# Patient Record
Sex: FEMALE | Race: WHITE | Employment: FULL TIME | URBAN - METROPOLITAN AREA
[De-identification: names, ages, dates, MRNs, and addresses within clinical notes are randomized per-mention and may not be internally consistent; named-entity substitution may affect disease eponyms.]

---

## 2020-07-17 ENCOUNTER — HOSPITAL ENCOUNTER (EMERGENCY)
Age: 50
Discharge: HOME | End: 2020-07-17
Attending: EMERGENCY MEDICINE
Payer: COMMERCIAL

## 2020-07-17 ENCOUNTER — APPOINTMENT (OUTPATIENT)
Dept: GENERAL RADIOLOGY | Age: 50
End: 2020-07-17
Payer: COMMERCIAL

## 2020-07-17 VITALS
SYSTOLIC BLOOD PRESSURE: 126 MMHG | DIASTOLIC BLOOD PRESSURE: 85 MMHG | BODY MASS INDEX: 25.07 KG/M2 | OXYGEN SATURATION: 99 % | HEART RATE: 88 BPM | HEIGHT: 66 IN | WEIGHT: 156 LBS | TEMPERATURE: 101.2 F | RESPIRATION RATE: 18 BRPM

## 2020-07-17 LAB
ABSOLUTE EOS #: 0.05 K/UL (ref 0–0.44)
ABSOLUTE IMMATURE GRANULOCYTE: 0.01 K/UL (ref 0–0.3)
ABSOLUTE LYMPH #: 1.41 K/UL (ref 1.1–3.7)
ABSOLUTE MONO #: 0.57 K/UL (ref 0.1–1.2)
ALBUMIN SERPL-MCNC: 4.2 G/DL (ref 3.5–5.2)
ALBUMIN/GLOBULIN RATIO: ABNORMAL (ref 1–2.5)
ALP BLD-CCNC: 81 U/L (ref 35–104)
ALT SERPL-CCNC: 20 U/L (ref 5–33)
ANION GAP SERPL CALCULATED.3IONS-SCNC: 11 MMOL/L (ref 9–17)
AST SERPL-CCNC: 28 U/L
BASOPHILS # BLD: 1 % (ref 0–2)
BASOPHILS ABSOLUTE: <0.03 K/UL (ref 0–0.2)
BILIRUB SERPL-MCNC: 0.22 MG/DL (ref 0.3–1.2)
BILIRUBIN DIRECT: 0.08 MG/DL
BILIRUBIN, INDIRECT: 0.14 MG/DL (ref 0–1)
BUN BLDV-MCNC: 7 MG/DL (ref 6–20)
BUN/CREAT BLD: 9 (ref 9–20)
CALCIUM SERPL-MCNC: 8.9 MG/DL (ref 8.6–10.4)
CHLORIDE BLD-SCNC: 101 MMOL/L (ref 98–107)
CO2: 25 MMOL/L (ref 20–31)
CREAT SERPL-MCNC: 0.76 MG/DL (ref 0.5–0.9)
DIFFERENTIAL TYPE: ABNORMAL
EOSINOPHILS RELATIVE PERCENT: 1 % (ref 1–4)
FERRITIN: 263 UG/L (ref 13–150)
GFR AFRICAN AMERICAN: >60 ML/MIN
GFR NON-AFRICAN AMERICAN: >60 ML/MIN
GFR SERPL CREATININE-BSD FRML MDRD: NORMAL ML/MIN/{1.73_M2}
GFR SERPL CREATININE-BSD FRML MDRD: NORMAL ML/MIN/{1.73_M2}
GLOBULIN: ABNORMAL G/DL (ref 1.5–3.8)
GLUCOSE BLD-MCNC: 94 MG/DL (ref 70–99)
HCT VFR BLD CALC: 44 % (ref 36.3–47.1)
HEMOGLOBIN: 14.5 G/DL (ref 11.9–15.1)
IMMATURE GRANULOCYTES: 0 %
LACTATE DEHYDROGENASE: 160 U/L (ref 135–214)
LACTIC ACID, SEPSIS WHOLE BLOOD: NORMAL MMOL/L (ref 0.5–1.9)
LACTIC ACID, SEPSIS: 0.7 MMOL/L (ref 0.5–1.9)
LYMPHOCYTES # BLD: 35 % (ref 24–43)
MCH RBC QN AUTO: 28.8 PG (ref 25.2–33.5)
MCHC RBC AUTO-ENTMCNC: 33 G/DL (ref 28.4–34.8)
MCV RBC AUTO: 87.5 FL (ref 82.6–102.9)
MONOCYTES # BLD: 14 % (ref 3–12)
NRBC AUTOMATED: 0 PER 100 WBC
PDW BLD-RTO: 12.5 % (ref 11.8–14.4)
PLATELET # BLD: 187 K/UL (ref 138–453)
PLATELET ESTIMATE: ABNORMAL
PMV BLD AUTO: 9.5 FL (ref 8.1–13.5)
POTASSIUM SERPL-SCNC: 4.3 MMOL/L (ref 3.7–5.3)
RBC # BLD: 5.03 M/UL (ref 3.95–5.11)
RBC # BLD: ABNORMAL 10*6/UL
SEG NEUTROPHILS: 49 % (ref 36–65)
SEGMENTED NEUTROPHILS ABSOLUTE COUNT: 1.96 K/UL (ref 1.5–8.1)
SODIUM BLD-SCNC: 137 MMOL/L (ref 135–144)
TOTAL PROTEIN: 7.3 G/DL (ref 6.4–8.3)
WBC # BLD: 4 K/UL (ref 3.5–11.3)
WBC # BLD: ABNORMAL 10*3/UL

## 2020-07-17 PROCEDURE — 80048 BASIC METABOLIC PNL TOTAL CA: CPT

## 2020-07-17 PROCEDURE — 6370000000 HC RX 637 (ALT 250 FOR IP): Performed by: NURSE PRACTITIONER

## 2020-07-17 PROCEDURE — 82728 ASSAY OF FERRITIN: CPT

## 2020-07-17 PROCEDURE — 83615 LACTATE (LD) (LDH) ENZYME: CPT

## 2020-07-17 PROCEDURE — 80076 HEPATIC FUNCTION PANEL: CPT

## 2020-07-17 PROCEDURE — 83605 ASSAY OF LACTIC ACID: CPT

## 2020-07-17 PROCEDURE — 71045 X-RAY EXAM CHEST 1 VIEW: CPT

## 2020-07-17 PROCEDURE — 85025 COMPLETE CBC W/AUTO DIFF WBC: CPT

## 2020-07-17 PROCEDURE — 99284 EMERGENCY DEPT VISIT MOD MDM: CPT

## 2020-07-17 RX ORDER — AMOXICILLIN 500 MG/1
500 CAPSULE ORAL 3 TIMES DAILY
COMMUNITY
End: 2021-01-29

## 2020-07-17 RX ORDER — ACETAMINOPHEN 500 MG
TABLET ORAL
Qty: 60 TABLET | Refills: 0 | Status: SHIPPED | OUTPATIENT
Start: 2020-07-17 | End: 2021-01-29

## 2020-07-17 RX ORDER — ACETAMINOPHEN 325 MG/1
650 TABLET ORAL ONCE
Status: COMPLETED | OUTPATIENT
Start: 2020-07-17 | End: 2020-07-17

## 2020-07-17 RX ORDER — ALBUTEROL SULFATE 90 UG/1
2 AEROSOL, METERED RESPIRATORY (INHALATION) EVERY 6 HOURS PRN
Qty: 1 INHALER | Refills: 0 | Status: SHIPPED | OUTPATIENT
Start: 2020-07-17 | End: 2021-01-29

## 2020-07-17 RX ADMIN — ACETAMINOPHEN 650 MG: 325 TABLET ORAL at 19:04

## 2020-07-17 ASSESSMENT — PAIN DESCRIPTION - PAIN TYPE: TYPE: ACUTE PAIN

## 2020-07-17 ASSESSMENT — ENCOUNTER SYMPTOMS
COLOR CHANGE: 0
NAUSEA: 0
COUGH: 1
VOICE CHANGE: 0
SORE THROAT: 0
DIARRHEA: 0
RHINORRHEA: 0
SINUS PRESSURE: 0
TROUBLE SWALLOWING: 0
VOMITING: 0
CHEST TIGHTNESS: 1
ABDOMINAL PAIN: 0
FACIAL SWELLING: 0
SHORTNESS OF BREATH: 0

## 2020-07-17 ASSESSMENT — PAIN SCALES - GENERAL
PAINLEVEL_OUTOF10: 4
PAINLEVEL_OUTOF10: 4

## 2020-07-17 ASSESSMENT — PAIN DESCRIPTION - ORIENTATION: ORIENTATION: MID

## 2020-07-17 ASSESSMENT — PAIN DESCRIPTION - LOCATION: LOCATION: CHEST

## 2020-07-17 ASSESSMENT — PAIN DESCRIPTION - DESCRIPTORS: DESCRIPTORS: DISCOMFORT

## 2020-07-17 NOTE — LETTER
[facility] ED  [address] [id number]  Phone: [phone]             July 17, 2020    Patient: Andre Valera   YOB: 1970   Date of Visit: 7/17/2020       To Whom It May Concern:    Dewaellis Vega was seen and treated in our emergency department on 7/17/2020. Please excuse her from work 7/17/2020 through 7/31/2020.   Sincerely,             Signature:__________________________________

## 2020-07-17 NOTE — ED PROVIDER NOTES
[facility] ED  eMERGENCY dEPARTMENT eNCOUnter      Pt Name: Leonides Wall  MRN: [id number]  [place] 1970  Date of evaluation: 7/17/2020  Provider: GRICELDA Wright 7446       Chief Complaint   Patient presents with    Fever    Cough    Chest Pain         HISTORY OF PRESENT ILLNESS  (Location/Symptom, Timing/Onset, Context/Setting, Quality, Duration, Modifying Factors, Severity.)   Leonides Wall is a 54 y.o. female who presents to the emergency department via private auto. Reports she had 5 teeth pulled 7/14/2020. States the following day she had N/V all day. She developed a fever, cough, chest tightness yesterday. Denies diarrhea, abdominal pain, facial swelling, SOB, difficulty swallowing. Rates her pain 4/10 at this time. Nursing Notes were reviewed. ALLERGIES     Nsaids and Sulfamethoxazole-trimethoprim    CURRENT MEDICATIONS       Discharge Medication List as of 7/17/2020  7:38 PM      CONTINUE these medications which have NOT CHANGED    Details   amoxicillin (AMOXIL) 500 MG capsule Take 500 mg by mouth 3 times dailyHistorical Med      pantoprazole sodium (PROTONIX) 40 MG PACK packet Take 40 mg by mouth daily      ranitidine (ZANTAC) 150 MG capsule Take 150 mg by mouth 2 times daily      DULoxetine (CYMBALTA) 20 MG capsule   Take 60 mg by mouth daily              PAST MEDICAL HISTORY         Diagnosis Date    H. pylori infection     Headache(784.0)     Sleep apnea        SURGICAL HISTORY           Procedure Laterality Date    ABDOMEN SURGERY      CHOLECYSTECTOMY      GASTRIC BYPASS SURGERY      HYSTERECTOMY      LEEP      NASAL SINUS SURGERY      TUBAL LIGATION           FAMILY HISTORY     History reviewed. No pertinent family history. No family status information on file. SOCIAL HISTORY      reports that she has quit smoking. She has never used smokeless tobacco. She reports that she does not drink alcohol or use drugs.     REVIEW OF SYSTEMS    (2-9 systems for level 4, 10 or more for level 5)     Review of Systems   Constitutional: Positive for chills and fever. Negative for diaphoresis and fatigue. HENT: Positive for dental problem. Negative for congestion, drooling, ear discharge, ear pain, facial swelling, postnasal drip, rhinorrhea, sinus pressure, sore throat, trouble swallowing and voice change. Respiratory: Positive for cough and chest tightness. Negative for shortness of breath. Cardiovascular: Negative for chest pain and palpitations. Gastrointestinal: Negative for abdominal pain, diarrhea, nausea and vomiting. Genitourinary: Negative for dysuria. Musculoskeletal: Positive for myalgias. Negative for arthralgias, neck pain and neck stiffness. Skin: Negative for color change and rash. Neurological: Negative for dizziness, weakness, light-headedness and headaches. Except as noted above the remainder of the review of systems was reviewed and negative. PHYSICAL EXAM    (up to 7 for level 4, 8 or more for level 5)     ED Triage Vitals [07/17/20 1842]   BP Temp Temp Source Pulse Resp SpO2 Height Weight   126/85 101.2 °F (38.4 °C) Oral 88 18 99 % 5' 6\" (1.676 m) 156 lb (70.8 kg)     Physical Exam  Vitals signs reviewed. Constitutional:       General: She is not in acute distress. Appearance: She is well-developed. She is not diaphoretic. Eyes:      General: No scleral icterus. Conjunctiva/sclera: Conjunctivae normal.   Neck:      Vascular: No JVD. Cardiovascular:      Rate and Rhythm: Normal rate. Pulmonary:      Effort: Pulmonary effort is normal. No respiratory distress. Musculoskeletal:      Comments: Moves extremities. Gait steady. Skin:     General: Skin is warm and dry. Findings: No rash. Neurological:      Mental Status: She is alert and oriented to person, place, and time.    Psychiatric:         Behavior: Behavior normal.         DIAGNOSTIC RESULTS       RADIOLOGY:   Non-plain film images such as CT, Ultrasound and MRI are read by the radiologist. Plain radiographic images are visualized and preliminarily interpreted by the emergency physician with the below findings:    Interpretation per the Radiologist below, if available at the time of this note:    Xr Chest Portable    Result Date: 7/17/2020  EXAMINATION: ONE XRAY VIEW OF THE CHEST 7/17/2020 7:06 pm COMPARISON: 01/31/2008 HISTORY: ORDERING SYSTEM PROVIDED HISTORY: fever, cough TECHNOLOGIST PROVIDED HISTORY: fever, cough Reason for Exam: fever Acuity: Acute Type of Exam: Initial FINDINGS: The cardiomediastinal silhouette is within normal limits. There is no consolidation, pneumothorax or evidence for edema. No evidence for effusion. No acute osseous abnormality is identified. No acute airspace disease identified. LABS:  Labs Reviewed   CBC WITH AUTO DIFFERENTIAL - Abnormal; Notable for the following components:       Result Value    Monocytes 14 (*)     All other components within normal limits   HEPATIC FUNCTION PANEL - Abnormal; Notable for the following components: Total Bilirubin 0.22 (*)     All other components within normal limits   FERRITIN - Abnormal; Notable for the following components:    Ferritin 263 (*)     All other components within normal limits   BASIC METABOLIC PANEL   LACTATE, SEPSIS   LACTATE DEHYDROGENASE       All other labs were within normal range or not returned as of this dictation. EMERGENCY DEPARTMENT COURSE and DIFFERENTIAL DIAGNOSIS/MDM:   Vitals:    Vitals:    07/17/20 1842   BP: 126/85   Pulse: 88   Resp: 18   Temp: 101.2 °F (38.4 °C)   TempSrc: Oral   SpO2: 99%   Weight: 156 lb (70.8 kg)   Height: 5' 6\" (1.676 m)         MEDICATIONS GIVEN IN THE ED:  Medications   acetaminophen (TYLENOL) tablet 650 mg (650 mg Oral Given 7/17/20 1904)       CLINICAL DECISION MAKING:  The patient presented alert with a nontoxic appearance and was seen in conjunction with Dr. Samina Rawls.  Ferritin was mildly with this plan. FINAL IMPRESSION      1. Viral illness              DISPOSITION/PLAN   DISPOSITION Decision To Discharge 07/17/2020 07:37:21 PM      PATIENT REFERRED TO:   Kacie Ibarra MD  [address] [place] [address]  [phone]    Schedule an appointment as soon as possible for a visit       [facility] ED  [address] [facility]  [phone]          DISCHARGE MEDICATIONS:     Discharge Medication List as of 7/17/2020  7:38 PM      START taking these medications    Details   albuterol sulfate HFA (PROVENTIL HFA) 108 (90 Base) MCG/ACT inhaler Inhale 2 puffs into the lungs every 6 hours as needed for Wheezing or Shortness of Breath, Disp-1 Inhaler,R-0Print      acetaminophen (TYLENOL) 500 MG tablet Take 1-2 tabs PO q4-6h prn fever/pain. Not to exceed 3 g daily. , Disp-60 tablet,R-0Print                 (Please note that portions of this note were completed with a voice recognition program.  Efforts were made to edit the dictations but occasionally words are mis-transcribed.)    GRICELDA Bentley - MAULIK Yates, GRICELDA - MAULIK  07/17/20 [id number]

## 2020-07-17 NOTE — ED PROVIDER NOTES
eMERGENCY dEPARTMENT eNCOUnter   Independent Attestation     Pt Name: Tee Owusu  MRN: [id number]  [place] 1970  Date of evaluation: 7/17/20     Tee Owusu is a 54 y.o. female with CC: Fever; Cough; and Chest Pain      Based on the medical record the care appears appropriate. I was personally available for consultation in the Emergency Department.     Ginna Hurley MD  Attending Emergency Physician                   Ginna Hurley MD  07/17/20 1918

## 2020-07-17 NOTE — LETTER
[facility] ED  [address] [address]  Phone: [phone]             July 17, 2020    Patient: Vicky Brand   YOB: 1970   Date of Visit: 7/17/2020       To Whom It May Concern:    Mey Paulson was seen and treated in our emergency department on 7/17/2020. Please excuse her from work 7/17/2020 through 7/21/2020.     Sincerely,             Signature:__________________________________

## 2020-07-18 ENCOUNTER — CARE COORDINATION (OUTPATIENT)
Dept: CARE COORDINATION | Age: 50
End: 2020-07-18

## 2020-07-20 NOTE — CARE COORDINATION
Calls to patient for COVID19 monitoring due to ED  Visit 7/17/20. Patient unavailable and unable to leave  A message, no voicemail is set up.

## 2020-08-03 ENCOUNTER — HOSPITAL ENCOUNTER (OUTPATIENT)
Age: 50
Discharge: HOME | End: 2020-08-03

## 2020-08-03 LAB — RUBV IGG SER QL: 53.1 IU/ML

## 2020-08-03 PROCEDURE — 86735 MUMPS ANTIBODY: CPT

## 2020-08-03 PROCEDURE — 36415 COLL VENOUS BLD VENIPUNCTURE: CPT

## 2020-08-03 PROCEDURE — 86787 VARICELLA-ZOSTER ANTIBODY: CPT

## 2020-08-03 PROCEDURE — 86762 RUBELLA ANTIBODY: CPT

## 2020-08-03 PROCEDURE — 86481 TB AG RESPONSE T-CELL SUSP: CPT

## 2020-08-03 PROCEDURE — 86765 RUBEOLA ANTIBODY: CPT

## 2020-08-05 LAB
MEASLES IMMUNE (IGG): 3.78
MUV IGG SER QL: 2.08
VZV IGG SER QL IA: 1.89

## 2020-08-06 LAB — T-SPOT TB TEST: NORMAL

## 2021-01-29 ENCOUNTER — OFFICE VISIT (OUTPATIENT)
Dept: GASTROENTEROLOGY | Age: 51
End: 2021-01-29
Payer: COMMERCIAL

## 2021-01-29 VITALS
WEIGHT: 170.5 LBS | DIASTOLIC BLOOD PRESSURE: 81 MMHG | BODY MASS INDEX: 27.52 KG/M2 | HEART RATE: 103 BPM | SYSTOLIC BLOOD PRESSURE: 118 MMHG

## 2021-01-29 DIAGNOSIS — R11.0 NAUSEA: ICD-10-CM

## 2021-01-29 DIAGNOSIS — K59.09 CHRONIC CONSTIPATION: ICD-10-CM

## 2021-01-29 PROCEDURE — 99204 OFFICE O/P NEW MOD 45 MIN: CPT | Performed by: INTERNAL MEDICINE

## 2021-01-29 PROCEDURE — 1036F TOBACCO NON-USER: CPT | Performed by: INTERNAL MEDICINE

## 2021-01-29 PROCEDURE — G8484 FLU IMMUNIZE NO ADMIN: HCPCS | Performed by: INTERNAL MEDICINE

## 2021-01-29 PROCEDURE — G8419 CALC BMI OUT NRM PARAM NOF/U: HCPCS | Performed by: INTERNAL MEDICINE

## 2021-01-29 PROCEDURE — G8427 DOCREV CUR MEDS BY ELIG CLIN: HCPCS | Performed by: INTERNAL MEDICINE

## 2021-01-29 RX ORDER — AMOXICILLIN 250 MG
2 CAPSULE ORAL DAILY
Qty: 60 TABLET | Refills: 5 | Status: SHIPPED | OUTPATIENT
Start: 2021-01-29 | End: 2021-02-10

## 2021-01-29 RX ORDER — PANTOPRAZOLE SODIUM 40 MG/1
40 TABLET, DELAYED RELEASE ORAL
Qty: 30 TABLET | Refills: 5 | Status: SHIPPED | OUTPATIENT
Start: 2021-01-29 | End: 2021-02-10

## 2021-01-29 RX ORDER — SODIUM, POTASSIUM,MAG SULFATES 17.5-3.13G
1 SOLUTION, RECONSTITUTED, ORAL ORAL ONCE
Qty: 1 BOTTLE | Refills: 0 | Status: SHIPPED | OUTPATIENT
Start: 2021-01-29 | End: 2021-01-29

## 2021-01-29 RX ORDER — ONDANSETRON HYDROCHLORIDE 4 MG/5ML
4 SOLUTION ORAL 2 TIMES DAILY PRN
COMMUNITY
End: 2021-10-20

## 2021-01-29 RX ORDER — POLYETHYLENE GLYCOL 3350 17 G/17G
17 POWDER, FOR SOLUTION ORAL DAILY
Qty: 1 BOTTLE | Refills: 11 | COMMUNITY
Start: 2021-01-29 | End: 2021-02-10

## 2021-01-29 ASSESSMENT — ENCOUNTER SYMPTOMS
TROUBLE SWALLOWING: 0
WHEEZING: 0
ABDOMINAL DISTENTION: 1
ANAL BLEEDING: 0
DIARRHEA: 0
COUGH: 1
RECTAL PAIN: 0
SINUS PRESSURE: 1
SORE THROAT: 0
CHOKING: 0
VOICE CHANGE: 0
ABDOMINAL PAIN: 1
BLOOD IN STOOL: 0
CONSTIPATION: 1
BACK PAIN: 0
NAUSEA: 1
VOMITING: 0

## 2021-01-29 NOTE — PROGRESS NOTES
Reason for Referral:   No referring provider defined for this encounter. Chief Complaint   Patient presents with    Nausea     Gastric bypass in 2016 and felt better but lately she has been nausous after ever meal     Bloated    Constipation     She gets constipated terrible as well. HISTORY OF PRESENT ILLNESS: Sean Berry is a 52 y.o. female , referred for evaluation of nausea. 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 She has had issues with nausea for a while. She underwent EGD last June at [address] [place] [address]. She was noted to have candy cane changes. She was recommended to have surgery. She moved to [facility]. She continues to report nausea daily. She reports difficult bowel movements. Bowel movement every other day or so. Incomplete evacuation. Sometimes stool comes in small caliber. No blood in the stool or melena. Weight is stable. No family history of GI malignancy. No prior colonoscopy. Past Medical,Family, and Social History reviewed and does not contribute to the patient presentingcondition. Patient's PMH/PSH,SH,PSYCH Hx, MEDs, ALLERGIES, and ROS were all reviewed and updated in the appropriate sections.     PAST MEDICAL HISTORY:  Past Medical History:   Diagnosis Date    H. pylori infection     Headache(784.0)     Sleep apnea        Past Surgical History:   Procedure Laterality Date    ABDOMEN SURGERY      CHOLECYSTECTOMY      GASTRIC BYPASS SURGERY      HYSTERECTOMY      LEEP      NASAL SINUS SURGERY      TUBAL LIGATION         CURRENT MEDICATIONS:    Current Outpatient Medications:     ondansetron (ZOFRAN) 4 MG/5ML solution, Take 4 mg by mouth 2 times daily as needed for Nausea, Disp: , Rfl:     Cyanocobalamin (B-12 COMPLIANCE INJECTION IJ), Inject as directed, Disp: , Rfl:     ALLERGIES:   Allergies   Allergen Reactions    Nsaids     Sulfamethoxazole-Trimethoprim Hives and Rash       FAMILY HISTORY: No family history on file.      SOCIAL HISTORY:   Social History     Socioeconomic History    Marital status:      Spouse name: Not on file    Number of children: Not on file    Years of education: Not on file    Highest education level: Not on file   Occupational History    Not on file   Social Needs    Financial resource strain: Not on file    Food insecurity     Worry: Not on file     Inability: Not on file    Transportation needs     Medical: Not on file     Non-medical: Not on file   Tobacco Use    Smoking status: Former Smoker    Smokeless tobacco: Never Used   Substance and Sexual Activity    Alcohol use: No    Drug use: No    Sexual activity: Not on file   Lifestyle    Physical activity     Days per week: Not on file     Minutes per session: Not on file    Stress: Not on file   Relationships    Social connections     Talks on phone: Not on file     Gets together: Not on file     Attends [employer] service: Not on file     Active member of club or organization: Not on file     Attends meetings of clubs or organizations: Not on file     Relationship status: Not on file    Intimate partner violence     Fear of current or ex partner: Not on file     Emotionally abused: Not on file     Physically abused: Not on file     Forced sexual activity: Not on file   Other Topics Concern    Not on file   Social History Narrative    Not on file       REVIEW OF SYSTEMS: A 12-point review of systemswas obtained and pertinent positives and negatives were enumerated above in the history of present illness. All other reviewed systems / symptoms were negative. Review of Systems   Constitutional: Positive for appetite change and fatigue. Negative for unexpected weight change. HENT: Positive for postnasal drip and sinus pressure. Negative for dental problem, sore throat, trouble swallowing and voice change. Eyes: Positive for visual disturbance. Respiratory: Positive for cough. Negative for choking and wheezing. Cardiovascular: Negative. Negative for chest pain, palpitations and leg swelling. Gastrointestinal: Positive for abdominal distention, abdominal pain, constipation and nausea. Negative for anal bleeding, blood in stool, diarrhea, rectal pain and vomiting. Endocrine: Negative. Genitourinary: Negative. Negative for difficulty urinating. Musculoskeletal: Positive for arthralgias. Negative for back pain, gait problem and myalgias. Skin: Negative. Allergic/Immunologic: Positive for environmental allergies. Negative for food allergies. Neurological: Positive for light-headedness. Negative for dizziness, weakness, numbness and headaches. Hematological: Negative. Does not bruise/bleed easily. Psychiatric/Behavioral: Negative for sleep disturbance. The patient is nervous/anxious. LABORATORY DATA: Reviewed  Lab Results   Component Value Date    WBC 4.0 07/17/2020    HGB 14.5 07/17/2020    HCT 44.0 07/17/2020    MCV 87.5 07/17/2020     07/17/2020     07/17/2020    K 4.3 07/17/2020     07/17/2020    CO2 25 07/17/2020    BUN 7 07/17/2020    CREATININE 0.76 07/17/2020    LABALBU 4.2 07/17/2020    BILITOT 0.22 (L) 07/17/2020    ALKPHOS 81 07/17/2020    AST 28 07/17/2020    ALT 20 07/17/2020         Lab Results   Component Value Date    RBC 5.03 07/17/2020    HGB 14.5 07/17/2020    MCV 87.5 07/17/2020    MCH 28.8 07/17/2020    MCHC 33.0 07/17/2020    RDW 12.5 07/17/2020    MPV 9.5 07/17/2020    BASOPCT 1 07/17/2020    LYMPHSABS 1.41 07/17/2020    MONOSABS 0.57 07/17/2020    NEUTROABS 1.96 07/17/2020    EOSABS 0.05 07/17/2020    BASOSABS <0.03 07/17/2020         DIAGNOSTIC TESTING:     No results found. There were no vitals taken for this visit. PHYSICAL EXAMINATION: Vital signs reviewed per the nursing documentation. There is no height or weight on file to calculate BMI.    Physical Exam      I personally reviewed the nurse's notes and documentation and I agree with her notes. General: alert, appears stated age and cooperative Psych: Normal. and Alert and oriented, appropriate affect. . Normal affect. Mentation normal  HEENT: PERRLA. Clear conjunctivae and sclerae. Moist oral mucosae, no lesions or ulcers. The neck is supple, without lymphadenopathy or jugular venous distension. No masses. Normal thyroid. Cardiovascular: S1 S2 RRR no rubs or murmurs. Pulmonary: clear BL. No accessory muscle usage. Abdominal Exam: Soft, NT ND, no hepato or spleno megaly, +BS, no ascites. No groin masses or lymphadenopathy. Extremities: no lower ext edema. Skin: Warm skin. No skin rash. No spider nevi palmar erythema nail dystrophy. Joint: No joint swelling or deformity. Neurological: intact sensory. DTR+. IMPRESSION: Ms. Lulu Zuniga is a 52 y.o. female with nausea. Possibly due to her Roberto-en-Y bypass surgery. She may need referral to bariatric surgery. Significant component of constipation. Trial of MiraLAX in addition to Deborah-Colace. We will plan for colonoscopy. Protonix daily. Crush the tablet for adequate absorption. Spent 30 minutes providing patient education and counseling. Thank you for allowing me to participate in the care of Ms. Rosario. For any further questions please do not hesitate to contact me. I have reviewed and agree with the MA/LPN ROS. Note is dictated utilizing voice recognition software. Unfortunately this leads to occasional typographical errors. Please contact our office if you have any questions.     Nika Don MD  [facility]  O: #[phone]

## 2021-02-10 ENCOUNTER — TELEPHONE (OUTPATIENT)
Dept: GASTROENTEROLOGY | Age: 51
End: 2021-02-10

## 2021-02-10 RX ORDER — AMOXICILLIN 250 MG
2 CAPSULE ORAL DAILY
Qty: 180 TABLET | Refills: 3 | Status: SHIPPED | OUTPATIENT
Start: 2021-02-10

## 2021-02-10 RX ORDER — POLYETHYLENE GLYCOL 3350 17 G/17G
17 POWDER, FOR SOLUTION ORAL DAILY
Qty: 1 BOTTLE | Refills: 11 | Status: SHIPPED | OUTPATIENT
Start: 2021-02-10 | End: 2021-03-12

## 2021-02-10 RX ORDER — PANTOPRAZOLE SODIUM 40 MG/1
40 TABLET, DELAYED RELEASE ORAL
Qty: 90 TABLET | Refills: 3 | Status: SHIPPED | OUTPATIENT
Start: 2021-02-10 | End: 2021-09-14 | Stop reason: SDUPTHER

## 2021-03-01 ENCOUNTER — HOSPITAL ENCOUNTER (OUTPATIENT)
Dept: PREADMISSION TESTING | Age: 51
Discharge: HOME | End: 2021-03-05
Payer: COMMERCIAL

## 2021-03-01 VITALS — BODY MASS INDEX: 26.84 KG/M2 | HEIGHT: 66 IN | WEIGHT: 167 LBS

## 2021-03-01 NOTE — PROGRESS NOTES
Pre-op Instructions For Out-Patient Surgery    Medication Instructions:  · Please stop herbs and any supplements now (includes vitamins and minerals). · Please contact your surgeon and prescribing physician for pre-op instructions for any blood thinners. none    · If you have inhalers/aerosol treatments at home, please use them the morning of your surgery and bring the inhalers with you to the hospital. None     · Please take the following medications the morning of your surgery with a sip of water:    Pantoprazole ( Protonix )     Surgery Instructions:  1. After midnight before surgery:  Do not eat or drink anything, including water, mints, gum, and hard candy. You may brush your teeth without swallowing. No smoking, chewing tobacco, or street drugs. 2. Please shower or bathe before surgery. 3. Please do not wear any cologne, lotion, powder, deodorant, jewelry, piercings, perfume, makeup, nail polish, hair accessories, or hair spray on the day of surgery. Wear loose comfortable clothing. 4. Leave your valuables at home. Bring a storage case for any glasses/contacts. 5. An adult who is responsible for you MUST drive you home and should be with you for the first 24 hours after surgery. 6. If having out-patient knee and foot surgeries, please arrange for planned crutches, walker, or wheelchair before arriving to the hospital.    The Day of Surgery:  · Arrive at [address] Surgery Entrance at the time directed by your surgeon and check in at the desk. · If you have a living will or healthcare power of , please bring a copy. · You will be taken to the pre-op holding area where you will be prepared for surgery. A physical assessment will be performed by a nurse practitioner or house officer.   Your IV will be started and you will meet your anesthesiologist.    · We are currently limiting visitors to only one designated person in the pre-op holding area. When you go to surgery, your family will be directed to the surgical waiting room, where the doctor should speak with them after your surgery. · After surgery, you will be taken to the recovery room then when you are awake and stable you will go to the short stay unit for preparation to be discharged. Only your one designated person is allowed to come to short stay for your discharge. · If you use a Bi-PAP or C-PAP machine, please bring it with you and leave it in the car in case it is needed in recovery room.

## 2021-03-06 ENCOUNTER — HOSPITAL ENCOUNTER (OUTPATIENT)
Dept: LAB | Age: 51
Setting detail: SPECIMEN
Discharge: HOME | End: 2021-03-06
Payer: COMMERCIAL

## 2021-03-06 DIAGNOSIS — Z01.818 PREOP TESTING: Primary | ICD-10-CM

## 2021-03-06 PROCEDURE — U0005 INFEC AGEN DETEC AMPLI PROBE: HCPCS

## 2021-03-06 PROCEDURE — U0003 INFECTIOUS AGENT DETECTION BY NUCLEIC ACID (DNA OR RNA); SEVERE ACUTE RESPIRATORY SYNDROME CORONAVIRUS 2 (SARS-COV-2) (CORONAVIRUS DISEASE [COVID-19]), AMPLIFIED PROBE TECHNIQUE, MAKING USE OF HIGH THROUGHPUT TECHNOLOGIES AS DESCRIBED BY CMS-2020-01-R: HCPCS

## 2021-03-07 LAB
SARS-COV-2: NORMAL
SARS-COV-2: NOT DETECTED
SOURCE: NORMAL

## 2021-03-08 ENCOUNTER — TELEPHONE (OUTPATIENT)
Dept: PRIMARY CARE CLINIC | Age: 51
End: 2021-03-08

## 2021-03-09 ENCOUNTER — ANESTHESIA EVENT (OUTPATIENT)
Dept: ENDOSCOPY | Age: 51
End: 2021-03-09
Payer: COMMERCIAL

## 2021-03-10 ENCOUNTER — HOSPITAL ENCOUNTER (OUTPATIENT)
Age: 51
Setting detail: OUTPATIENT SURGERY
Discharge: HOME | End: 2021-03-10
Attending: INTERNAL MEDICINE | Admitting: INTERNAL MEDICINE
Payer: COMMERCIAL

## 2021-03-10 ENCOUNTER — ANESTHESIA (OUTPATIENT)
Dept: ENDOSCOPY | Age: 51
End: 2021-03-10
Payer: COMMERCIAL

## 2021-03-10 VITALS
RESPIRATION RATE: 15 BRPM | SYSTOLIC BLOOD PRESSURE: 102 MMHG | OXYGEN SATURATION: 100 % | DIASTOLIC BLOOD PRESSURE: 61 MMHG

## 2021-03-10 VITALS
SYSTOLIC BLOOD PRESSURE: 109 MMHG | BODY MASS INDEX: 26.84 KG/M2 | OXYGEN SATURATION: 99 % | RESPIRATION RATE: 16 BRPM | DIASTOLIC BLOOD PRESSURE: 72 MMHG | TEMPERATURE: 97.2 F | HEART RATE: 69 BPM | WEIGHT: 167 LBS | HEIGHT: 66 IN

## 2021-03-10 PROCEDURE — 6360000002 HC RX W HCPCS: Performed by: NURSE ANESTHETIST, CERTIFIED REGISTERED

## 2021-03-10 PROCEDURE — 7100000010 HC PHASE II RECOVERY - FIRST 15 MIN: Performed by: INTERNAL MEDICINE

## 2021-03-10 PROCEDURE — 7100000000 HC PACU RECOVERY - FIRST 15 MIN: Performed by: INTERNAL MEDICINE

## 2021-03-10 PROCEDURE — 7100000030 HC ASPR PHASE II RECOVERY - FIRST 15 MIN: Performed by: INTERNAL MEDICINE

## 2021-03-10 PROCEDURE — 7100000011 HC PHASE II RECOVERY - ADDTL 15 MIN: Performed by: INTERNAL MEDICINE

## 2021-03-10 PROCEDURE — 7100000031 HC ASPR PHASE II RECOVERY - ADDTL 15 MIN: Performed by: INTERNAL MEDICINE

## 2021-03-10 PROCEDURE — 2580000003 HC RX 258: Performed by: ANESTHESIOLOGY

## 2021-03-10 PROCEDURE — 3700000000 HC ANESTHESIA ATTENDED CARE: Performed by: INTERNAL MEDICINE

## 2021-03-10 PROCEDURE — 2709999900 HC NON-CHARGEABLE SUPPLY: Performed by: INTERNAL MEDICINE

## 2021-03-10 PROCEDURE — 7100000001 HC PACU RECOVERY - ADDTL 15 MIN: Performed by: INTERNAL MEDICINE

## 2021-03-10 PROCEDURE — 3609027000 HC COLONOSCOPY: Performed by: INTERNAL MEDICINE

## 2021-03-10 PROCEDURE — 3700000001 HC ADD 15 MINUTES (ANESTHESIA): Performed by: INTERNAL MEDICINE

## 2021-03-10 PROCEDURE — 45378 DIAGNOSTIC COLONOSCOPY: CPT | Performed by: INTERNAL MEDICINE

## 2021-03-10 RX ORDER — SODIUM CHLORIDE, SODIUM LACTATE, POTASSIUM CHLORIDE, CALCIUM CHLORIDE 600; 310; 30; 20 MG/100ML; MG/100ML; MG/100ML; MG/100ML
INJECTION, SOLUTION INTRAVENOUS CONTINUOUS
Status: CANCELLED | OUTPATIENT
Start: 2021-03-10

## 2021-03-10 RX ORDER — SODIUM CHLORIDE 0.9 % (FLUSH) 0.9 %
10 SYRINGE (ML) INJECTION EVERY 12 HOURS SCHEDULED
Status: DISCONTINUED | OUTPATIENT
Start: 2021-03-10 | End: 2021-03-10 | Stop reason: HOSPADM

## 2021-03-10 RX ORDER — PROPOFOL 10 MG/ML
INJECTION, EMULSION INTRAVENOUS PRN
Status: DISCONTINUED | OUTPATIENT
Start: 2021-03-10 | End: 2021-03-10 | Stop reason: SDUPTHER

## 2021-03-10 RX ORDER — LIDOCAINE HYDROCHLORIDE 10 MG/ML
1 INJECTION, SOLUTION EPIDURAL; INFILTRATION; INTRACAUDAL; PERINEURAL
Status: CANCELLED | OUTPATIENT
Start: 2021-03-10 | End: 2021-03-10

## 2021-03-10 RX ORDER — M-VIT,TX,IRON,MINS/CALC/FOLIC 27MG-0.4MG
1 TABLET ORAL DAILY
COMMUNITY
End: 2022-03-31

## 2021-03-10 RX ORDER — PROPOFOL 10 MG/ML
INJECTION, EMULSION INTRAVENOUS CONTINUOUS PRN
Status: DISCONTINUED | OUTPATIENT
Start: 2021-03-10 | End: 2021-03-10 | Stop reason: SDUPTHER

## 2021-03-10 RX ORDER — SODIUM CHLORIDE 9 MG/ML
INJECTION, SOLUTION INTRAVENOUS CONTINUOUS
Status: CANCELLED | OUTPATIENT
Start: 2021-03-10

## 2021-03-10 RX ORDER — SODIUM CHLORIDE, SODIUM LACTATE, POTASSIUM CHLORIDE, CALCIUM CHLORIDE 600; 310; 30; 20 MG/100ML; MG/100ML; MG/100ML; MG/100ML
INJECTION, SOLUTION INTRAVENOUS CONTINUOUS
Status: DISCONTINUED | OUTPATIENT
Start: 2021-03-10 | End: 2021-03-10 | Stop reason: HOSPADM

## 2021-03-10 RX ORDER — LIDOCAINE HYDROCHLORIDE 10 MG/ML
1 INJECTION, SOLUTION EPIDURAL; INFILTRATION; INTRACAUDAL; PERINEURAL
Status: DISCONTINUED | OUTPATIENT
Start: 2021-03-10 | End: 2021-03-10 | Stop reason: HOSPADM

## 2021-03-10 RX ORDER — SODIUM CHLORIDE 0.9 % (FLUSH) 0.9 %
10 SYRINGE (ML) INJECTION PRN
Status: DISCONTINUED | OUTPATIENT
Start: 2021-03-10 | End: 2021-03-10 | Stop reason: HOSPADM

## 2021-03-10 RX ORDER — SODIUM CHLORIDE 0.9 % (FLUSH) 0.9 %
10 SYRINGE (ML) INJECTION PRN
Status: CANCELLED | OUTPATIENT
Start: 2021-03-10

## 2021-03-10 RX ORDER — SODIUM CHLORIDE 0.9 % (FLUSH) 0.9 %
10 SYRINGE (ML) INJECTION EVERY 12 HOURS SCHEDULED
Status: CANCELLED | OUTPATIENT
Start: 2021-03-10

## 2021-03-10 RX ADMIN — SODIUM CHLORIDE, POTASSIUM CHLORIDE, SODIUM LACTATE AND CALCIUM CHLORIDE: 600; 310; 30; 20 INJECTION, SOLUTION INTRAVENOUS at 08:03

## 2021-03-10 RX ADMIN — PROPOFOL 80 MG: 10 INJECTION, EMULSION INTRAVENOUS at 09:35

## 2021-03-10 RX ADMIN — PROPOFOL 125 MCG/KG/MIN: 10 INJECTION, EMULSION INTRAVENOUS at 09:35

## 2021-03-10 SDOH — HEALTH STABILITY: MENTAL HEALTH: HOW OFTEN DO YOU HAVE A DRINK CONTAINING ALCOHOL?: NOT ASKED

## 2021-03-10 ASSESSMENT — ENCOUNTER SYMPTOMS
NAUSEA: 1
VOMITING: 0
WHEEZING: 0
SORE THROAT: 0
CONSTIPATION: 1
SHORTNESS OF BREATH: 0
COUGH: 0
BLOOD IN STOOL: 0
ABDOMINAL DISTENTION: 0
SHORTNESS OF BREATH: 0
RHINORRHEA: 0
ANAL BLEEDING: 0
ABDOMINAL PAIN: 0
STRIDOR: 0
DIARRHEA: 0

## 2021-03-10 ASSESSMENT — PULMONARY FUNCTION TESTS
PIF_VALUE: 1

## 2021-03-10 ASSESSMENT — PAIN SCALES - GENERAL
PAINLEVEL_OUTOF10: 0
PAINLEVEL_OUTOF10: 0

## 2021-03-10 ASSESSMENT — LIFESTYLE VARIABLES: SMOKING_STATUS: 0

## 2021-03-10 NOTE — OP NOTE
[facility]     PROCEDURE DATE: 03/10/21    REFERRING PHYSICIAN: No ref. provider found     PRIMARY CARE PROVIDER: Srini Montemayor MD    ATTENDING PHYSICIAN: Rosalinda Acosta MD     HISTORY: Ms. Comfort Su is a 52 y.o. female who presents to the [address] [address] Endoscopy unit for colonoscopy. The patient's clinical history is remarkable for chronic constipation. She is currently medically stable and appropriate for the planned procedure. PREOPERATIVE DIAGNOSIS: constipation. PROCEDURES:   Transanal Colonoscopy --diagnostic. POSTPROCEDURE DIAGNOSIS:  No pathology    SPECIMENS: none    MEDICATIONS:     MAC per anesthesia    EBL: minimal    INSTRUMENT: Olympus PCF-H190 AL flexible Colonoscope. PREPARATION: The nature and character of the procedure as well as risks, benefits, and alternatives were discussed with the patient and informed consent was obtained. Complications were said to include, but were not limited to: medication allergy, medication reaction, cardiovascular and respiratory problems, bleeding, perforation, infection, and/or missed diagnosis. Following arrival in the endoscopy room, the patient was placed in the left lateral decubitus position and final time-out accomplished in the presence of the nursing staff. Baseline vital signs were obtained and reviewed, and IV sedation was subsequently initiated. FINDINGS: Rectal examination demonstrated no significant visible external abnormality and digital palpation was unremarkable. Following adequate conscious sedation the colonoscope was introduced and advanced under direct visualization to the cecum, which was identified by the ileocecal valve and appendiceal orifice. The bowel preparation was felt to be adequate. This included small amounts of thin and watery stool that was able to be adequately irrigated and aspirated. Cecal intubation time was 3 minutes.      Once maximally inserted, the endoscope was withdrawn and the mucosa was carefully inspected. The mucosal exam was normal. Retroflexion was performed in the rectum and showed no pathology. Withdrawal time was 10 minutes. RECOMMENDATIONS:   1) Follow up with referring provider, as previously scheduled. 2) Continue current treatment. 3) Increas dietary fiber intake. 4) Follow-up with me in the office in 4 weeks.       Brittany Marie

## 2021-03-10 NOTE — ANESTHESIA PRE PROCEDURE
Department of Anesthesiology  Preprocedure Note       Name:  Linnea Jordan   Age:  52 y.o.  :  1970                                          MRN:  [id number]         Date:  3/10/2021      Surgeon: Richi Hammer):  Willis Johnston MD    Procedure: Procedure(s):  COLONOSCOPY DIAGNOSTIC    Medications prior to admission:   Prior to Admission medications    Medication Sig Start Date End Date Taking? Authorizing Provider   Calcium-Vitamin D-Vitamin K (CALCIUM SOFT CHEWS PO) Take by mouth 3 times daily   Yes Historical Provider, MD   Multiple Vitamins-Minerals (THERAPEUTIC MULTIVITAMIN-MINERALS) tablet Take 1 tablet by mouth daily   Yes Historical Provider, MD   polyethylene glycol (MIRALAX) 17 GM/SCOOP powder Take 17 g by mouth daily 2/10/21 3/12/21 Yes Willis Johnston MD   senna-docusate (PERICOLACE) 8.6-50 MG per tablet Take 2 tablets by mouth daily 2/10/21  Yes Willis Johnston MD   pantoprazole (PROTONIX) 40 MG tablet Take 1 tablet by mouth every morning (before breakfast) 2/10/21  Yes Willis Johnston MD   ondansetron (ZOFRAN) 4 MG/5ML solution Take 4 mg by mouth 2 times daily as needed for Nausea   Yes Historical Provider, MD   Cyanocobalamin (B-12 COMPLIANCE INJECTION IJ) Inject as directed   Yes Historical Provider, MD       Current medications:    Current Facility-Administered Medications   Medication Dose Route Frequency Provider Last Rate Last Admin    lactated ringers infusion   Intravenous Continuous Peggy Palafox  mL/hr at 03/10/21 0803 New Bag at 03/10/21 0803    sodium chloride flush 0.9 % injection 10 mL  10 mL Intravenous 2 times per day Peggy Palafox MD        sodium chloride flush 0.9 % injection 10 mL  10 mL Intravenous PRN Peggy Palafox MD        lidocaine PF 1 % injection 1 mL  1 mL Intradermal Once PRN Peggy Palafox MD           Allergies:     Allergies   Allergen Reactions    Nsaids     Hydrocodone-Acetaminophen Nausea Only    Oxycodone-Acetaminophen Nausea Only    Sulfamethoxazole-Trimethoprim Hives and Rash       Problem List:  There is no problem list on file for this patient. Past Medical History:        Diagnosis Date    Abnormal findings on esophagogastroduodenoscopy (EGD) 2020    Gastritis, tiny hiatal hernia, candy cane deformity of gastrojejunostomy    Arthritis     Chronic constipation     Gastritis     GERD (gastroesophageal reflux disease)     H. pylori infection     Headache(784.0)     Hiatal hernia     Tiny per EGD 20    Prolonged emergence from general anesthesia     Sleep apnea     Pt states has resolved since gastric bypass surgery.         Past Surgical History:        Procedure Laterality Date    CHOLECYSTECTOMY  1991    COLONOSCOPY      ENDOSCOPY, COLON, DIAGNOSTIC  2020    GASTRIC BYPASS SURGERY  2016    Roberto-en-Y    HYSTERECTOMY      LAPAROSCOPY      LEEP      NASAL SINUS SURGERY      TUBAL LIGATION         Social History:    Social History     Tobacco Use    Smoking status: Former Smoker     Quit date:      Years since quittin.1    Smokeless tobacco: Never Used   Substance Use Topics    Alcohol use: Not Currently                                Counseling given: Not Answered      Vital Signs (Current):   Vitals:    03/10/21 0741   BP: 96/64   Pulse: 65   Resp: 16   Temp: 97.3 °F (36.3 °C)   TempSrc: Infrared   SpO2: 100%   Weight: 167 lb (75.8 kg)   Height: 5' 6\" (1.676 m)                                              BP Readings from Last 3 Encounters:   03/10/21 96/64   21 118/81   20 126/85       NPO Status: Time of last liquid consumption:                         Time of last solid consumption:                         Date of last liquid consumption: 21                        Date of last solid food consumption: 21    BMI:   Wt Readings from Last 3 Encounters:   03/10/21 167 lb (75.8 kg)   21 167 lb (75.8 kg)   21 170 lb 8 oz (77.3 kg)     Body mass index is 26.95 kg/m². CBC:   Lab Results   Component Value Date    WBC 4.0 07/17/2020    RBC 5.03 07/17/2020    HGB 14.5 07/17/2020    HCT 44.0 07/17/2020    MCV 87.5 07/17/2020    RDW 12.5 07/17/2020     07/17/2020     LR    CMP:   Lab Results   Component Value Date     07/17/2020    K 4.3 07/17/2020     07/17/2020    CO2 25 07/17/2020    BUN 7 07/17/2020    CREATININE 0.76 07/17/2020    GFRAA >60 07/17/2020    LABGLOM >60 07/17/2020    GLUCOSE 94 07/17/2020    PROT 7.3 07/17/2020    CALCIUM 8.9 07/17/2020    BILITOT 0.22 07/17/2020    ALKPHOS 81 07/17/2020    AST 28 07/17/2020    ALT 20 07/17/2020       POC Tests: No results for input(s): POCGLU, POCNA, POCK, POCCL, POCBUN, POCHEMO, POCHCT in the last 72 hours. Coags: No results found for: PROTIME, INR, APTT    HCG (If Applicable):   Lab Results   Component Value Date    HCG NEGATIVE 05/19/2015        ABGs: No results found for: PHART, PO2ART, VHT3UNI, EXP3KGA, BEART, S2TXRUJI     Type & Screen (If Applicable):  No results found for: LABABO, LABRH    Drug/Infectious Status (If Applicable):  No results found for: HIV, HEPCAB    COVID-19 Screening (If Applicable):   Lab Results   Component Value Date    COVID19 Not Detected 03/06/2021         Anesthesia Evaluation  Patient summary reviewed and Nursing notes reviewed no history of anesthetic complications:   Airway: Mallampati: II  TM distance: >3 FB   Neck ROM: full  Mouth opening: > = 3 FB Dental: normal exam         Pulmonary:normal exam  breath sounds clear to auscultation  (+) sleep apnea:      (-) pneumonia, COPD, asthma, shortness of breath, recent URI, rhonchi, wheezes, rales, stridor and not a current smoker          Patient did not smoke on day of surgery.                  Cardiovascular:Negative CV ROS  Exercise tolerance: good (>4 METS),       (-) pacemaker, hypertension, valvular problems/murmurs, past MI, CAD, CABG/stent, dysrhythmias,  angina,  CHF, orthopnea, PND,  MCMANUS, murmur, weak pulses,  friction rub, systolic click, carotid bruit,  JVD and peripheral edema      Rhythm: regular  Rate: normal           Beta Blocker:  Not on Beta Blocker         Neuro/Psych:   (+) headaches:,    (-) seizures, neuromuscular disease, TIA, CVA, psychiatric history and depression/anxiety            GI/Hepatic/Renal:   (+) hiatal hernia, GERD: no interval change,      (-) PUD, hepatitis, liver disease, no renal disease, bowel prep and no morbid obesity       Endo/Other: Negative Endo/Other ROS   (+) no malignancy/cancer. (-) diabetes mellitus, hypothyroidism, hyperthyroidism, blood dyscrasia, arthritis, no electrolyte abnormalities, no malignancy/cancer               Abdominal:           Vascular: negative vascular ROS. - PVD, DVT and PE. Anesthesia Plan      MAC and general     ASA 2       Induction: intravenous. MIPS: Postoperative opioids intended and Prophylactic antiemetics administered. Anesthetic plan and risks discussed with patient. Plan discussed with CRNA. The patient was counseled at length about the risks of dianne Covid-19 during their perioperative period and any recovery window from their procedure. The patient was made aware that dianne Covid-19  may worsen their prognosis for recovering from their procedure  and lend to a higher morbidity and/or mortality risk. All material risks, benefits, and reasonable alternatives including postponing the procedure were discussed. The patient DOES wish to proceed with the procedure at this time.           Alex Orantes MD   3/10/2021

## 2021-03-10 NOTE — ANESTHESIA POSTPROCEDURE EVALUATION
POST- ANESTHESIA EVALUATION       Pt Name: Domenic Lopez  MRN: [id number]  [place]: 1970  Date of evaluation: 3/10/2021  Time:  11:50 AM      BP (!) 92/53   Pulse 58   Temp 97.2 °F (36.2 °C) (Temporal)   Resp 16   Ht 5' 6\" (1.676 m)   Wt 167 lb (75.8 kg)   SpO2 99%   BMI 26.95 kg/m²      Consciousness Level  Awake  Cardiopulmonary Status  Stable  Pain Adequately Treated YES  Nausea / Vomiting  NO  Adequate Hydration  YES  Anesthesia Related Complications NONE      Electronically signed by Flory Sequeira MD on 3/10/2021 at 11:50 AM       Department of Anesthesiology  Postprocedure Note    Patient: Domenic Lopez  MRN: [id number]  [place]: 1970  Date of evaluation: 3/10/2021  Time:  11:50 AM     Procedure Summary     Date: 03/10/21 Room / Location: [address] [place] [address] 04 / [address] [place] [address] ENDO    Anesthesia Start: 0513 Anesthesia Stop: 0797    Procedure: COLONOSCOPY DIAGNOSTIC (N/A Anus) Diagnosis: (CHRONIC CONSTIPATION)    Surgeons: Eda Rosa MD Responsible Provider: Flory Sequeira MD    Anesthesia Type: MAC, general ASA Status: 2          Anesthesia Type: MAC, general    Janki Phase I: Janki Score: 9    Janki Phase II:      Last vitals: Reviewed and per EMR flowsheets.        Anesthesia Post Evaluation

## 2021-03-10 NOTE — H&P
HISTORY and [facility] [address] [address]       NAME:  Comfort Su  MRN: [id number]   YOB: 1970   Date: 3/10/2021   Age: 52 y.o. Gender: female     COMPLAINT AND PRESENT HISTORY:   Comfort Su is 52 y.o.,  female, undergoing COLONOSCOPY DIAGNOSTIC  for CHRONIC CONSTIPATION per Dr. Randy Mccartney. Patient does have a hx of gastric bypass in 2016, about a year after this she started to experience issues w/constipation, she does admit to not drinking as much fluid as she should. She has experienced nausea for about 3 years. Over the last 6 months, she notes when she drinks, she can feel/hear the liquid in her stomach. Sometime when she eats, she notices similar s/s with gurgling of her stomach. There are times when she eats, and then feels nauseated after a few bites. Denies ABD pain. She typically has a BM 2-3 times/week- \"rodríguez\", feels like she did not complete BM fully. She did try magnesium citrate, which did not help. Denies vomiting/hematemsis. Denies recent unintended weight loss/appetite change except she does not want to eat d/t nausea. She does believe she had a colonoscopy in the past \"years ago\"- denies any known polyps. Denies dysphagia, pharyngitis, voice change. She did have an EGD in 2020- was told her pouch has a \"hook\", surgery was recommended, but patient wants to hold off on this. Denies blood in stool/black tarry stools. Findings, 20:  Findings:  -gastritis  -tiny hiatal hernia  -candy cane deformity of the gastrojejunostomy  Surgical Pathology Consultation   Patient Name: Danielle Pimentel : 1970 (Age: 54) Gender: F Taken: 2020 Reported: 6/15/2020 Physician(s): Regi Back MD Copy To:  Accession #: [id number] [facility]. Rec.  #: [id number] Acct: # [de-identified]   Final Pathologic Diagnosis  Stomach, biopsy:       Gastric mucosa with no significant histologic abnormalities.       No Helicobacter pylori organisms seen on H&E stain.       Socioeconomic History    Marital status:      Spouse name: None    Number of children: None    Years of education: None    Highest education level: None   Occupational History    None   Social Needs    Financial resource strain: None    Food insecurity     Worry: None     Inability: None    Transportation needs     Medical: None     Non-medical: None   Tobacco Use    Smoking status: Former Smoker     Quit date:      Years since quittin.1    Smokeless tobacco: Never Used   Substance and Sexual Activity    Alcohol use: Not Currently    Drug use: No    Sexual activity: None   Lifestyle    Physical activity     Days per week: None     Minutes per session: None    Stress: None   Relationships    Social connections     Talks on phone: None     Gets together: None     Attends [employer] service: None     Active member of club or organization: None     Attends meetings of clubs or organizations: None     Relationship status: None    Intimate partner violence     Fear of current or ex partner: None     Emotionally abused: None     Physically abused: None     Forced sexual activity: None   Other Topics Concern    None   Social History Narrative    None       REVIEW OF SYSTEMS      Allergies   Allergen Reactions    Nsaids     Hydrocodone-Acetaminophen Nausea Only    Oxycodone-Acetaminophen Nausea Only    Sulfamethoxazole-Trimethoprim Hives and Rash       No current facility-administered medications on file prior to encounter.       Current Outpatient Medications on File Prior to Encounter   Medication Sig Dispense Refill    polyethylene glycol (MIRALAX) 17 GM/SCOOP powder Take 17 g by mouth daily 1 Bottle 11    senna-docusate (PERICOLACE) 8.6-50 MG per tablet Take 2 tablets by mouth daily 180 tablet 3    pantoprazole (PROTONIX) 40 MG tablet Take 1 tablet by mouth every morning (before breakfast) 90 tablet 3    ondansetron (ZOFRAN) 4 MG/5ML solution Take 4 mg by mouth 2 times daily as needed for Nausea      Cyanocobalamin (B-12 COMPLIANCE INJECTION IJ) Inject as directed       (Notation: Medications listed above are not currently reconciled at the signing of this H&P note, to be reconciled in pre-op per RN)    Review of Systems   Constitutional: Positive for appetite change. Negative for chills, fever and unexpected weight change. HENT: Negative for congestion, ear pain, postnasal drip, rhinorrhea and sore throat. Respiratory: Negative for cough, shortness of breath and wheezing. Cardiovascular: Negative for chest pain, palpitations and leg swelling. Gastrointestinal: Positive for constipation and nausea. Negative for abdominal distention, abdominal pain, anal bleeding, blood in stool, diarrhea and vomiting. Genitourinary: Negative. Neurological: Negative for dizziness. Hematological: Does not bruise/bleed easily. GENERAL PHYSICAL EXAM     Vitals: Review current vital signs per RN flow sheet. GENERAL APPEARANCE:   Trevor Peters is 52 y.o.,  female, not obese, nourished, conscious, alert. Does not appear to be in any distress or pain at this time. Physical Exam   Constitutional: She is oriented to person, place, and time. She appears well-developed and well-nourished. Non-toxic appearance. She does not have a sickly appearance. She does not appear ill. No distress. HENT:   Head: Normocephalic. Right Ear: External ear normal.   Left Ear: External ear normal.   Mouth/Throat: Oropharynx is clear and moist and mucous membranes are normal. No oropharyngeal exudate, posterior oropharyngeal edema, posterior oropharyngeal erythema or tonsillar abscesses. Eyes: Conjunctivae are normal. Right eye exhibits no discharge. Left eye exhibits no discharge. Cardiovascular: Normal rate, regular rhythm, normal heart sounds and intact distal pulses. Pulses:       Radial pulses are 2+ on the right side and 2+ on the left side.         Dorsalis pedis pulses are 2+ on the right side and 2+ on the left side. Posterior tibial pulses are 2+ on the right side and 2+ on the left side. Pulmonary/Chest: Effort normal and breath sounds normal. No respiratory distress. She has no wheezes. She has no rales. Abdominal: Soft. Bowel sounds are normal. She exhibits no distension and no mass. There is no abdominal tenderness. There is no rebound and no guarding. Musculoskeletal: Normal range of motion. Right lower leg: She exhibits no tenderness and no swelling. Left lower leg: She exhibits no tenderness and no swelling. Neurological: She is alert and oriented to person, place, and time. Skin: Skin is warm and dry. She is not diaphoretic. Psychiatric: She has a normal mood and affect. Her behavior is normal.       RECENT LAB WORK     Lab Results   Component Value Date     07/17/2020    K 4.3 07/17/2020     07/17/2020    CO2 25 07/17/2020    BUN 7 07/17/2020    CREATININE 0.76 07/17/2020    GLUCOSE 94 07/17/2020    CALCIUM 8.9 07/17/2020    PROT 7.3 07/17/2020    LABALBU 4.2 07/17/2020    BILITOT 0.22 (L) 07/17/2020    ALKPHOS 81 07/17/2020    AST 28 07/17/2020    ALT 20 07/17/2020    LABGLOM >60 07/17/2020    GFRAA >60 07/17/2020    GLOB NOT REPORTED 07/17/2020     Lab Results   Component Value Date    WBC 4.0 07/17/2020    HGB 14.5 07/17/2020    HCT 44.0 07/17/2020    MCV 87.5 07/17/2020     07/17/2020     PROVISIONAL DIAGNOSES / SURGERY:      CHRONIC CONSTIPATION    COLONOSCOPY DIAGNOSTIC     There are no active problems to display for this patient.           Larry Alejandre, GRICELDA - CNP on 3/10/2021 at 7:35 AM

## 2021-04-13 ENCOUNTER — HOSPITAL ENCOUNTER (EMERGENCY)
Age: 51
Discharge: HOME | End: 2021-04-13
Attending: EMERGENCY MEDICINE
Payer: COMMERCIAL

## 2021-04-13 ENCOUNTER — APPOINTMENT (OUTPATIENT)
Dept: GENERAL RADIOLOGY | Age: 51
End: 2021-04-13
Payer: COMMERCIAL

## 2021-04-13 VITALS
DIASTOLIC BLOOD PRESSURE: 92 MMHG | OXYGEN SATURATION: 98 % | HEIGHT: 66 IN | HEART RATE: 89 BPM | SYSTOLIC BLOOD PRESSURE: 150 MMHG | TEMPERATURE: 97.7 F | RESPIRATION RATE: 18 BRPM | WEIGHT: 170 LBS | BODY MASS INDEX: 27.32 KG/M2

## 2021-04-13 DIAGNOSIS — M79.671 ACUTE PAIN OF RIGHT FOOT: Primary | ICD-10-CM

## 2021-04-13 PROCEDURE — 99283 EMERGENCY DEPT VISIT LOW MDM: CPT

## 2021-04-13 PROCEDURE — 73630 X-RAY EXAM OF FOOT: CPT

## 2021-04-13 ASSESSMENT — PAIN DESCRIPTION - LOCATION: LOCATION: FOOT

## 2021-04-13 ASSESSMENT — PAIN SCALES - GENERAL: PAINLEVEL_OUTOF10: 5

## 2021-04-14 NOTE — ED PROVIDER NOTES
EMERGENCY DEPARTMENT ENCOUNTER    Pt Name: Shiva Faustin  MRN: [id number]  [place] 1970  Date of evaluation: 4/13/21  CHIEF COMPLAINT       Chief Complaint   Patient presents with    Foot Pain     pt started having foot pain to the right foot after work, top of foot      HISTORY OF PRESENT ILLNESS   Patient is a 43-year-old female who presents to the ED complaining of acute right foot pain. Pain started earlier this afternoon while at work. She stood up from her desk and felt pain and stiffness to bottom of right foot. No trauma. No swelling. She went home and was unable to put weight on her right foot and return to the ED for evaluation. No other issues at this time. No fever, cough, shortness of breath, chest pain, abdominal pain. REVIEW OF SYSTEMS     Review of Systems   All other systems reviewed and are negative. PASTMEDICAL HISTORY     Past Medical History:   Diagnosis Date    Abnormal findings on esophagogastroduodenoscopy (EGD) 06/12/2020    Gastritis, tiny hiatal hernia, candy cane deformity of gastrojejunostomy    Arthritis     Chronic constipation     Gastritis     GERD (gastroesophageal reflux disease)     H. pylori infection     Headache(784.0)     Hiatal hernia     Tiny per EGD 6-12-20    Prolonged emergence from general anesthesia     Sleep apnea     Pt states has resolved since gastric bypass surgery.       SURGICAL HISTORY       Past Surgical History:   Procedure Laterality Date    CHOLECYSTECTOMY  11/1991    COLONOSCOPY      COLONOSCOPY N/A 3/10/2021    COLONOSCOPY DIAGNOSTIC performed [facility] Lloyd [facility] at [facility] [address], DIAGNOSTIC  06/12/2020    GASTRIC BYPASS SURGERY  2016    Roberto-en-Y    HYSTERECTOMY      LAPAROSCOPY      LEEP      NASAL SINUS SURGERY      TUBAL LIGATION       CURRENT MEDICATIONS       Previous Medications    CALCIUM-VITAMIN D-VITAMIN K (CALCIUM SOFT CHEWS PO)    Take by mouth 3 times daily    CYANOCOBALAMIN (B-12 COMPLIANCE INJECTION IJ)    Inject as directed    MULTIPLE VITAMINS-MINERALS (THERAPEUTIC MULTIVITAMIN-MINERALS) TABLET    Take 1 tablet by mouth daily    ONDANSETRON (ZOFRAN) 4 MG/5ML SOLUTION    Take 4 mg by mouth 2 times daily as needed for Nausea    PANTOPRAZOLE (PROTONIX) 40 MG TABLET    Take 1 tablet by mouth every morning (before breakfast)    SENNA-DOCUSATE (PERICOLACE) 8.6-50 MG PER TABLET    Take 2 tablets by mouth daily     ALLERGIES     is allergic to nsaids; hydrocodone-acetaminophen; oxycodone-acetaminophen; and sulfamethoxazole-trimethoprim. FAMILY HISTORY     She indicated that her mother is . She indicated that the status of her father is unknown. SOCIAL HISTORY       Social History     Tobacco Use    Smoking status: Former Smoker     Quit date:      Years since quittin.2    Smokeless tobacco: Never Used   Substance Use Topics    Alcohol use: Not Currently    Drug use: No     PHYSICAL EXAM     INITIAL VITALS: BP (!) 150/92   Pulse 89   Temp 97.7 °F (36.5 °C) (Oral)   Resp 18   Ht 5' 6\" (1.676 m)   Wt 170 lb (77.1 kg)   SpO2 98%   BMI 27.44 kg/m²    Physical Exam  HENT:      Head: Normocephalic. Right Ear: External ear normal.      Left Ear: External ear normal.      Nose: Nose normal.   Eyes:      Conjunctiva/sclera: Conjunctivae normal.   Cardiovascular:      Rate and Rhythm: Normal rate. Pulmonary:      Effort: Pulmonary effort is normal.   Abdominal:      General: Abdomen is flat. Musculoskeletal:      Comments: Tenderness mid plantar aspect right foot. No swelling. No erythema. No signs of trauma. Skin:     General: Skin is dry. Neurological:      Mental Status: She is alert. Mental status is at baseline. Psychiatric:         Mood and Affect: Mood normal.         Behavior: Behavior normal.      Comments:     Limited exam secondary to Covid-19 pandemic.          MEDICAL DECISION MAKING:   The patient is hemodynamically stable, afebrile, nontoxic-appearing. Physical exam notable for tenderness plantar aspect right foot  Based on history and exam differential occludes fracture versus inflammation. ED plan for x-ray right foot, reassess. DIAGNOSTIC RESULTS   EKG:All EKG's are interpreted by the Emergency Department Physician who either signs or Co-signs this chart in the absence of a cardiologist.        RADIOLOGY:All plain film, CT, MRI, and formal ultrasound images (except ED bedside ultrasound) are read by the radiologist, see reports below, unless otherwisenoted in MDM or here. XR FOOT RIGHT (MIN 3 VIEWS)   Final Result   No acute osseous abnormality. LABS: All lab results were reviewed by myself, and all abnormals are listed below. Labs Reviewed - No data to display    EMERGENCY DEPARTMENTCOURSE:   Patient did well in the ED. X-ray negative for acute osseous injury. Most likely plantar fasciitis or other inflammatory process  Foot wrapped in bandage for support. No further work-up indicated this time. Nursing notes reviewed. At this time this is what I find, the patient appears well and does not appear sick or toxic. I gave my usual and customary discussion of the risks and benefits of discharge versus admission. I answered the patient's questions. I gave the patient strict return precautions. Patient expressed understanding of the discharge instructions. Dictated but not reviewed. Vitals:    Vitals:    04/13/21 2020   BP: (!) 150/92   Pulse: 89   Resp: 18   Temp: 97.7 °F (36.5 °C)   TempSrc: Oral   SpO2: 98%   Weight: 170 lb (77.1 kg)   Height: 5' 6\" (1.676 m)       The patient was given the following medications while in the emergency department:  No orders of the defined types were placed in this encounter. CONSULTS:  None    FINAL IMPRESSION      1.  Acute pain of right foot          DISPOSITION/PLAN   DISPOSITION Decision To Discharge 04/13/2021 10:13:06 PM      PATIENT REFERRED TO:  Alyson Rutherford [address] [place] [id number]  [phone]    In 2 days      DISCHARGE MEDICATIONS:  New Prescriptions    No medications on file     Ginna Domínguez MD  Attending Emergency Physician                    Tom Joyce MD  04/13/21 7150

## 2021-06-16 ENCOUNTER — OFFICE VISIT (OUTPATIENT)
Dept: INTERNAL MEDICINE CLINIC | Age: 51
End: 2021-06-16
Payer: MEDICARE

## 2021-06-16 VITALS
BODY MASS INDEX: 27.97 KG/M2 | HEART RATE: 71 BPM | OXYGEN SATURATION: 97 % | WEIGHT: 174 LBS | SYSTOLIC BLOOD PRESSURE: 100 MMHG | DIASTOLIC BLOOD PRESSURE: 68 MMHG | HEIGHT: 66 IN

## 2021-06-16 DIAGNOSIS — Z98.84 STATUS POST GASTRIC BYPASS FOR OBESITY: ICD-10-CM

## 2021-06-16 DIAGNOSIS — R11.0 NAUSEA: ICD-10-CM

## 2021-06-16 DIAGNOSIS — L30.4 INTERTRIGO: ICD-10-CM

## 2021-06-16 DIAGNOSIS — F41.9 ANXIETY: Primary | ICD-10-CM

## 2021-06-16 DIAGNOSIS — Z13.220 SCREENING FOR HYPERLIPIDEMIA: ICD-10-CM

## 2021-06-16 PROCEDURE — 1036F TOBACCO NON-USER: CPT | Performed by: INTERNAL MEDICINE

## 2021-06-16 PROCEDURE — G8419 CALC BMI OUT NRM PARAM NOF/U: HCPCS | Performed by: INTERNAL MEDICINE

## 2021-06-16 PROCEDURE — 99214 OFFICE O/P EST MOD 30 MIN: CPT | Performed by: INTERNAL MEDICINE

## 2021-06-16 PROCEDURE — G8427 DOCREV CUR MEDS BY ELIG CLIN: HCPCS | Performed by: INTERNAL MEDICINE

## 2021-06-16 RX ORDER — NYSTATIN 100000 [USP'U]/G
POWDER TOPICAL
Qty: 60 G | Refills: 3 | Status: SHIPPED | OUTPATIENT
Start: 2021-06-16 | End: 2022-03-31

## 2021-06-16 RX ORDER — ONDANSETRON HYDROCHLORIDE 4 MG/5ML
4 SOLUTION ORAL 2 TIMES DAILY PRN
Status: CANCELLED | OUTPATIENT
Start: 2021-06-16

## 2021-06-16 RX ORDER — DULOXETIN HYDROCHLORIDE 20 MG/1
20 CAPSULE, DELAYED RELEASE ORAL DAILY
Qty: 30 CAPSULE | Refills: 3 | Status: SHIPPED | OUTPATIENT
Start: 2021-06-16 | End: 2022-04-27 | Stop reason: SDUPTHER

## 2021-06-16 RX ORDER — ONDANSETRON 4 MG/1
4 TABLET, ORALLY DISINTEGRATING ORAL EVERY 8 HOURS PRN
Qty: 90 TABLET | Refills: 3 | Status: SHIPPED | OUTPATIENT
Start: 2021-06-16 | End: 2022-03-31 | Stop reason: SDUPTHER

## 2021-06-16 ASSESSMENT — PATIENT HEALTH QUESTIONNAIRE - PHQ9
SUM OF ALL RESPONSES TO PHQ QUESTIONS 1-9: 0
1. LITTLE INTEREST OR PLEASURE IN DOING THINGS: 0
2. FEELING DOWN, DEPRESSED OR HOPELESS: 0
SUM OF ALL RESPONSES TO PHQ9 QUESTIONS 1 & 2: 0
SUM OF ALL RESPONSES TO PHQ QUESTIONS 1-9: 0
SUM OF ALL RESPONSES TO PHQ QUESTIONS 1-9: 0

## 2021-06-16 ASSESSMENT — ENCOUNTER SYMPTOMS
NAUSEA: 1
COLOR CHANGE: 0
SHORTNESS OF BREATH: 0
EYE PAIN: 0
EYE DISCHARGE: 0
BLOOD IN STOOL: 0
COUGH: 0
TROUBLE SWALLOWING: 0
ABDOMINAL DISTENTION: 0
DIARRHEA: 0
WHEEZING: 0

## 2021-06-16 NOTE — PROGRESS NOTES
[address] [address]  Dept: [phone]  Dept Fax: [phone]    Jovanna Jones is a 52 y.o. female who presents today for her medical conditions/complaintsas noted below. Jovanna Jones is c/o of   Chief Complaint   Patient presents with    New Patient    Anxiety         HPI:     Post bariatic gastic bypass  Anxiety        No results found for: LABA1C          ( goal A1Cis < 7)   No results found for: LABMICR  No results found for: LDLCHOLESTEROL, LDLCALC    (goal LDL is <100)   AST (U/L)   Date Value   2020 28     ALT (U/L)   Date Value   2020 20     BUN (mg/dL)   Date Value   2020 7     BP Readings from Last 3 Encounters:   21 100/68   21 (!) 150/92   03/10/21 102/61          (goal 120/80)    Past Medical History:   Diagnosis Date    Abnormal findings on esophagogastroduodenoscopy (EGD) 2020    Gastritis, tiny hiatal hernia, candy cane deformity of gastrojejunostomy    Arthritis     Chronic constipation     Gastritis     GERD (gastroesophageal reflux disease)     H. pylori infection     Headache(784.0)     Hiatal hernia     Tiny per EGD 20    Prolonged emergence from general anesthesia     Sleep apnea     Pt states has resolved since gastric bypass surgery.        Past Surgical History:   Procedure Laterality Date    CHOLECYSTECTOMY  1991    COLONOSCOPY      COLONOSCOPY N/A 3/10/2021    COLONOSCOPY DIAGNOSTIC performed by Good Ramirez MD at [address], [place], DIAGNOSTIC  2020    GASTRIC BYPASS SURGERY  2016    Roberto-en-Y    HYSTERECTOMY      LAPAROSCOPY      LEEP      NASAL SINUS SURGERY      TUBAL LIGATION         Family History   Problem Relation Age of Onset    Cancer Mother 64        ovarian cancer     No Known Problems Father        Social History     Tobacco Use    Smoking status: Former Smoker     Quit date:      Years since quittin.4    Smokeless tobacco: Never Used   Substance Use Topics    Alcohol use: Not Currently      Current Outpatient Medications   Medication Sig Dispense Refill    ondansetron (ZOFRAN-ODT) 4 MG disintegrating tablet Place 1 tablet under the tongue every 8 hours as needed for Nausea or Vomiting 90 tablet 3    DULoxetine (CYMBALTA) 20 MG extended release capsule Take 1 capsule by mouth daily 30 capsule 3    nystatin (MYCOSTATIN) 619339 UNIT/GM powder Apply 3 times daily. 60 g 3    Calcium-Vitamin D-Vitamin K (CALCIUM SOFT CHEWS PO) Take by mouth 3 times daily      Multiple Vitamins-Minerals (THERAPEUTIC MULTIVITAMIN-MINERALS) tablet Take 1 tablet by mouth daily      senna-docusate (PERICOLACE) 8.6-50 MG per tablet Take 2 tablets by mouth daily 180 tablet 3    pantoprazole (PROTONIX) 40 MG tablet Take 1 tablet by mouth every morning (before breakfast) 90 tablet 3    ondansetron (ZOFRAN) 4 MG/5ML solution Take 4 mg by mouth 2 times daily as needed for Nausea      Cyanocobalamin (B-12 COMPLIANCE INJECTION IJ) Inject as directed       No current facility-administered medications for this visit. Allergies   Allergen Reactions    Nsaids     Hydrocodone-Acetaminophen Nausea Only    Oxycodone-Acetaminophen Nausea Only    Sulfamethoxazole-Trimethoprim Hives and Rash       Health Maintenance   Topic Date Due    Hepatitis C screen  Never done    COVID-19 Vaccine (1) Never done    HIV screen  Never done    DTaP/Tdap/Td vaccine (1 - Tdap) Never done    Cervical cancer screen  Never done    Lipid screen  Never done    Flu vaccine  Completed    Hepatitis A vaccine  Aged Out    Hepatitis B vaccine  Aged Out    Hib vaccine  Aged Out    Meningococcal (ACWY) vaccine  Aged Out    Pneumococcal 0-64 years Vaccine  Aged Out       Subjective:     Review of Systems   Constitutional: Negative for appetite change, diaphoresis and fatigue. HENT: Negative for ear discharge and trouble swallowing.     Eyes: Negative for pain and discharge. Respiratory: Negative for cough, shortness of breath and wheezing. Cardiovascular: Negative for chest pain and palpitations. Gastrointestinal: Positive for nausea. Negative for abdominal distention, blood in stool and diarrhea. Endocrine: Negative for polydipsia and polyphagia. Genitourinary: Negative for difficulty urinating and frequency. Musculoskeletal: Negative for gait problem, myalgias and neck pain. Skin: Negative for color change and rash. Allergic/Immunologic: Negative for environmental allergies and food allergies. Neurological: Negative for dizziness and headaches. Hematological: Negative for adenopathy. Does not bruise/bleed easily. Psychiatric/Behavioral: Negative for behavioral problems and sleep disturbance. Objective:     Physical Exam  Constitutional:       Appearance: She is well-developed. She is not diaphoretic. HENT:      Head: Normocephalic and atraumatic. Eyes:      General:         Right eye: No discharge. Left eye: No discharge. Extraocular Movements:      Right eye: Normal extraocular motion. Left eye: Normal extraocular motion. Conjunctiva/sclera: Conjunctivae normal.      Right eye: Right conjunctiva is not injected. Left eye: Left conjunctiva is not injected. Neck:      Thyroid: No thyroid mass or thyromegaly. Vascular: No JVD. Cardiovascular:      Rate and Rhythm: Normal rate and regular rhythm. Heart sounds: No murmur heard. No friction rub. Pulmonary:      Effort: Pulmonary effort is normal. No tachypnea, bradypnea, accessory muscle usage or respiratory distress. Breath sounds: Normal breath sounds. No wheezing or rales. Abdominal:      General: Bowel sounds are normal. There is no distension. Palpations: Abdomen is soft. Tenderness: There is no abdominal tenderness. There is no rebound. Comments: Intertrigo     Musculoskeletal:         General: No tenderness.  Normal range of motion. Cervical back: Normal range of motion and neck supple. No edema or erythema. Lymphadenopathy:      Head:      Right side of head: No submental or submandibular adenopathy. Left side of head: No submental or submandibular adenopathy. Cervical: No cervical adenopathy. Skin:     General: Skin is warm. Coloration: Skin is not pale. Findings: No bruising, ecchymosis or rash. Neurological:      Mental Status: She is alert and oriented to person, place, and time. Cranial Nerves: No cranial nerve deficit. Sensory: No sensory deficit. Motor: No atrophy or abnormal muscle tone. Coordination: Coordination normal.   Psychiatric:         Mood and Affect: Mood is not anxious. Affect is not angry. Speech: Speech is not slurred. Behavior: Behavior normal. Behavior is not aggressive. Thought Content: Thought content does not include homicidal ideation. Cognition and Memory: Memory is not impaired. /68   Pulse 71   Ht 5' 6\" (1.676 m)   Wt 174 lb (78.9 kg)   SpO2 97%   BMI 28.08 kg/m²     Assessment:       Diagnosis Orders   1. Anxiety     2. Screening for hyperlipidemia  Lipid, Fasting   3. Status post gastric bypass for obesity  Basic Metabolic Panel    Comprehensive Metabolic Panel    Lipid Panel    CBC   4. Nausea     5. Intertrigo               Plan:      No follow-ups on file.     Orders Placed This Encounter   Procedures    Lipid, Fasting     Standing Status:   Future     Standing Expiration Date:   6/14/2022    Basic Metabolic Panel     Standing Status:   Future     Standing Expiration Date:   6/16/2022    Comprehensive Metabolic Panel     Standing Status:   Future     Standing Expiration Date:   6/16/2022    Lipid Panel     Standing Status:   Future     Standing Expiration Date:   6/16/2022     Order Specific Question:   Is Patient Fasting?/# of Hours     Answer:   10-12    CBC     Standing Status:   Future Standing Expiration Date:   6/16/2022     Orders Placed This Encounter   Medications    ondansetron (ZOFRAN-ODT) 4 MG disintegrating tablet     Sig: Place 1 tablet under the tongue every 8 hours as needed for Nausea or Vomiting     Dispense:  90 tablet     Refill:  3    DULoxetine (CYMBALTA) 20 MG extended release capsule     Sig: Take 1 capsule by mouth daily     Dispense:  30 capsule     Refill:  3    nystatin (MYCOSTATIN) 564014 UNIT/GM powder     Sig: Apply 3 times daily. Dispense:  60 g     Refill:  3    nw pt to office  [place] with dr Jaswant Velarde MA  Hx of gastic bypass  Anxiety  intertrogo  wildo above  See with resutl  Vitamin suplemt post byapss recommned     Patient given educational materials - see patient instructions. Discussed use, benefit, and side effects of prescribed medications. All patientquestions answered. Pt voiced understanding. Reviewed health maintenance. Instructedto continue current medications, diet and exercise. Patient agreed with treatmentplan. Follow up as directed.      Electronically signed by Torito Marshall MD on 6/16/2021 at 1:28 PM

## 2021-06-16 NOTE — PROGRESS NOTES
Visit Information    Have you changed or started any medications since your last visit including any over-the-counter medicines, vitamins, or herbal medicines? no   Are you having any side effects from any of your medications? -  no  Have you stopped taking any of your medications? Is so, why? -  no    Have you seen any other physician or provider since your last visit? Yes - Records Obtained  Have you had any other diagnostic tests since your last visit? No  Have you been seen in the emergency room and/or had an admission to a hospital since we last saw you? No  Have you had your routine dental cleaning in the past 6 months? yes -     Have you activated your [employer] account? If not, what are your barriers?  Yes     Patient Care Team:  Ambar Akers MD as PCP - General (Family Medicine)    Medical History Review  Past Medical, Family, and Social History reviewed and does not contribute to the patient presenting condition    Health Maintenance   Topic Date Due    Hepatitis C screen  Never done    COVID-19 Vaccine (1) Never done    HIV screen  Never done    DTaP/Tdap/Td vaccine (1 - Tdap) Never done    Cervical cancer screen  Never done    Lipid screen  Never done    Flu vaccine  Completed    Hepatitis A vaccine  Aged Out    Hepatitis B vaccine  Aged Out    Hib vaccine  Aged Out    Meningococcal (ACWY) vaccine  Aged Out    Pneumococcal 0-64 years Vaccine  Aged Out

## 2021-06-18 ENCOUNTER — TELEPHONE (OUTPATIENT)
Dept: INTERNAL MEDICINE CLINIC | Age: 51
End: 2021-06-18

## 2021-06-18 DIAGNOSIS — Z98.84 STATUS POST GASTRIC BYPASS FOR OBESITY: Primary | ICD-10-CM

## 2021-06-18 DIAGNOSIS — M79.3 PANNICULITIS: ICD-10-CM

## 2021-06-18 NOTE — TELEPHONE ENCOUNTER
Pt called requesting referral to plastic surgeon for the extra skin d/t bariatric surgery faxed to Kameron Fernández MD,   fax # [phone].     Referral pended for approval.

## 2021-08-25 ENCOUNTER — TELEPHONE (OUTPATIENT)
Dept: SURGERY | Age: 51
End: 2021-08-25

## 2021-08-25 ENCOUNTER — INITIAL CONSULT (OUTPATIENT)
Dept: SURGERY | Age: 51
End: 2021-08-25
Payer: COMMERCIAL

## 2021-08-25 VITALS
WEIGHT: 174 LBS | HEART RATE: 80 BPM | BODY MASS INDEX: 27.97 KG/M2 | OXYGEN SATURATION: 98 % | RESPIRATION RATE: 12 BRPM | HEIGHT: 66 IN

## 2021-08-25 DIAGNOSIS — E65 SYMPTOMATIC ABDOMINAL PANNICULUS: ICD-10-CM

## 2021-08-25 DIAGNOSIS — M62.08 DIASTASIS OF RECTUS ABDOMINIS: Primary | ICD-10-CM

## 2021-08-25 PROCEDURE — 99203 OFFICE O/P NEW LOW 30 MIN: CPT | Performed by: PLASTIC SURGERY

## 2021-08-25 PROCEDURE — G8427 DOCREV CUR MEDS BY ELIG CLIN: HCPCS | Performed by: PLASTIC SURGERY

## 2021-08-25 PROCEDURE — 1036F TOBACCO NON-USER: CPT | Performed by: PLASTIC SURGERY

## 2021-08-25 PROCEDURE — G8419 CALC BMI OUT NRM PARAM NOF/U: HCPCS | Performed by: PLASTIC SURGERY

## 2021-08-25 NOTE — LETTER
[facility]  [address] [place], [place] [address]  Phone: [phone]  Fax: [phone]    Leslie Gomez MD    August 25, 2021     Iqra Joseph, [address] [place] [id number]    Patient: Cindi Thornton   MR Number: [id number]   YOB: 1970   Date of Visit: 8/25/2021       Dear Iqra Joseph: Thank you for referring Prince Wood to me for evaluation/treatment. Below are the relevant portions of my assessment and plan of care. Plan:  I discussed the differences between a panniculectomy and abdominoplasty to the patient. The risk of both procedures were discussed with patient in detail. All questions were answered. If you have questions, please do not hesitate to call me. I look forward to following Beni Anchors along with you.     Sincerely,    MD Leslie Duque MD

## 2021-08-25 NOTE — PROGRESS NOTES
[address] [place] [address] SURGICAL SPECIALISTS  [facility] [id number]       History and Physical     Chief Complaint   Patient presents with    Panniculectomy        HPI:   Ezequiel Ding is a 52 y.o. female who presents status post bariatric surgery. Patient has lost over 100 pounds. Patient has maintained her weight. Her excess skin in her abdominal region affects her quality of life and her daily activities. The pannus has an odor to it. Interferes with additional exercises. It is painful and pulls on her abdominal skin. It prevents her close from fitting well. It pulls on her pubic hair. Patient continually gets rashes under the area. Patient has been treating it with multiple modalities including neuro sporting, nystatin powder and ointments with constant recurrence. Patient's initial weight was 250 pounds. Patient continuously also has back pain. Patient is here to discuss the excess skin in her abdominal region. Medications:     Current Outpatient Medications   Medication Sig Dispense Refill    ondansetron (ZOFRAN-ODT) 4 MG disintegrating tablet Place 1 tablet under the tongue every 8 hours as needed for Nausea or Vomiting 90 tablet 3    DULoxetine (CYMBALTA) 20 MG extended release capsule Take 1 capsule by mouth daily 30 capsule 3    nystatin (MYCOSTATIN) 481748 UNIT/GM powder Apply 3 times daily.  60 g 3    Calcium-Vitamin D-Vitamin K (CALCIUM SOFT CHEWS PO) Take by mouth 3 times daily      Multiple Vitamins-Minerals (THERAPEUTIC MULTIVITAMIN-MINERALS) tablet Take 1 tablet by mouth daily      senna-docusate (PERICOLACE) 8.6-50 MG per tablet Take 2 tablets by mouth daily 180 tablet 3    pantoprazole (PROTONIX) 40 MG tablet Take 1 tablet by mouth every morning (before breakfast) 90 tablet 3    ondansetron (ZOFRAN) 4 MG/5ML solution Take 4 mg by mouth 2 times daily as needed for Nausea      Cyanocobalamin (B-12 COMPLIANCE INJECTION IJ) Inject as directed       No current facility-administered medications for this visit. Allergies: Allergies   Allergen Reactions    Nsaids     Hydrocodone-Acetaminophen Nausea Only    Oxycodone-Acetaminophen Nausea Only    Sulfamethoxazole-Trimethoprim Hives and Rash     Review of Systems:   Constitutional: Negative for fever, chills, fatigue and unexpected weight change. HENT: Patient has history of headaches. Eyes: Negative for pain and discharge. Respiratory: Patient also has a history of sleep apnea. .    Cardiovascular: Negative for chest pain. Gastrointestinal: Patient has a history of GERD. Patient also has history of gastritis. Patient also has chronic constipation. Patient also has hiatal hernia. Skin: Negative for pallor and rash. Neurological: Negative for seizures, syncope and numbness. Hematological: Does not bruise/bleed easily. Psychiatric/Behavioral: Negative for behavioral problems. The patient is not nervous/anxious. Musculoskeletal: Patient has a history of arthritis. Past Medical History:   Diagnosis Date    Abnormal findings on esophagogastroduodenoscopy (EGD) 06/12/2020    Gastritis, tiny hiatal hernia, candy cane deformity of gastrojejunostomy    Arthritis     Chronic constipation     Gastritis     GERD (gastroesophageal reflux disease)     H. pylori infection     Headache(784.0)     Hiatal hernia     Tiny per EGD 6-12-20    Prolonged emergence from general anesthesia     Sleep apnea     Pt states has resolved since gastric bypass surgery.       Past Surgical History:   Procedure Laterality Date    CHOLECYSTECTOMY  11/1991    COLONOSCOPY      COLONOSCOPY N/A 3/10/2021    COLONOSCOPY DIAGNOSTIC performed by Corinne Mccarthy MD at [address], DIAGNOSTIC  06/12/2020    GASTRIC BYPASS SURGERY  2016    [facility]    HYSTERECTOMY      LAPAROSCOPY      LEEP      NASAL SINUS SURGERY      TUBAL LIGATION       Social History     Socioeconomic History    Marital status:      Spouse name: Not on file    Number of children: Not on file    Years of education: Not on file    Highest education level: Not on file   Occupational History    Not on file   Tobacco Use    Smoking status: Former Smoker     Quit date:      Years since quittin.6    Smokeless tobacco: Never Used   Vaping Use    Vaping Use: Former   Substance and Sexual Activity    Alcohol use: Not Currently    Drug use: No    Sexual activity: Not on file   Other Topics Concern    Not on file   Social History Narrative    Not on file     Social Determinants of Health     Financial Resource Strain:     Difficulty of Paying Living Expenses:    Food Insecurity:     Worried About [address] in the Last Year:     [address] [employer] in the Last Year:    Transportation Needs:     Lack of Transportation (Medical):  Lack of Transportation (Non-Medical):    Physical Activity:     Days of Exercise per Week:     Minutes of Exercise per Session:    Stress:     Feeling of Stress :    Social Connections:     Frequency of Communication with Friends and Family:     Frequency of Social Gatherings with Friends and Family:     Attends [employer] Services:     Active Member of Clubs or Organizations:     Attends Club or Organization Meetings:     Marital Status:    Intimate Partner Violence:     Fear of Current or Ex-Partner:     Emotionally Abused:     Physically Abused:     Sexually Abused:      Family History   Problem Relation Age of Onset    Cancer Mother 64        ovarian cancer     No Known Problems Father      Physical Exam:   Pulse 80   Resp 12   Ht 5' 6\" (1.676 m)   Wt 174 lb (78.9 kg)   SpO2 98%   BMI 28.08 kg/m²    Body mass index is 28.08 kg/m². Physical Exam   Nursing note and vitals reviewed. Constitutional: Oriented to person, place, and time. Appears well-developed and well-nourished. No distress. Head: Normocephalic and atraumatic.    Eyes: Conjunctivae and EOM are normal.   Pulmonary/Chest: Effort normal. No respiratory distress. Neurological: Alert and oriented to person, place, and time. Skin:   Patient has a history of rashes. Patient also has multiple striae. Patient also has excess skin in both horizontal and vertical direction. Abdomen: Soft nontender nondistended. Rectus diastases present. Psychiatric: Normal mood and affect. Behavior is normal    PANNICULECTOMY HEALTH CHECKLIST:  Height:     Weight:      BMI: There is no height or weight on file to calculate BMI. Does your pannus affect your daily activities and quality of life? Yes  x    No    How long: Which daily living activities are affected in the following ways? Cleaning of feet    Odor x   Interferes with exercise x   [place]   Painful pulling of abdominal skin    Clothes not fitting  x          Urination   Pulling of pubic hair    Seat belt not fitting    Other:      Back pain? Yes x     No        Any Treatment? Yes      No x        Any Testing? Yes      No x  Where/What:     Have you had bariatric surgery? Yes  x    No 8/25/2016        Initial weight: 250        Weight stable for how long? Since 2017   Do you have a hernia? Yes  x    No (see notes)        Testing:  CT      Date/location:    Have you had a hernia repair in the past?  Yes      No x        Was mesh used? Yes      No x        Surgeon for hernia:    Any Rashes/Ulcers under folds of skin? Yes  x    No         Medications used:                                                        OTC Neosporin                                                                                                RX Nystatin powder, ointment         Notes: patient has a hiatal hernia 6/12/2020  Slight hernia seen on colonoscopy 3/10/21    Imaging:                               Impression/Plan:      Diagnosis Orders   1. Diastasis of rectus abdominis     2.  Symptomatic abdominal panniculus         Patient Active Problem TREATMENTS  Alternative forms of management consist of not treating the areas of loose skin and fatty deposits. Liposuction may be a surgical alternative to if there is good skin tone and localized abdominal fatty. If you have lost your skin laxity as apparent by multiple stretch marks you will not be a good liposuction candidate. Diet and exercise programs may be of benefit in the overall reduction of excess body fat and contour improvement. Risks and potential complications are associated with alternative surgical forms of treatment. RISKS OF PANNICULECTOMY SURGERY  Every surgical procedure involves a certain amount of risk and it is important that you understand these risks and the possible complications associated with them. In addition, every procedure has limitations. An individual's choice to undergo a surgical procedure is based on the comparison of the risk to potential benefit. Although the majority of patients do not experience these complications, you should discuss each of them with your plastic surgeon to make sure you completely understand all possible consequences of a abdominoplasty/panniculectomy. Bleeding- It is possible, though unusual, to experience a bleeding episode during or after surgery. There is blood in the pannus, and depending the size of your pannus you may be subjected to considerable blood loss. Should post-operative bleeding occur, it may require an emergency treatment to drain the accumulated blood or blood transfusion. Intra-operative blood transfusions may be required. Do not take any aspirin or anti-inflammatory medications for ten days before surgery, as this may increase the risk of bleeding. Non-prescription herbs and dietary supplements can increase the risk of surgical bleeding. Hematoma can occur at any time following injury.  If blood transfusions are needed to treat blood loss, there is a risk of blood related infections such as hepatitis and the HIV (AIDS). Heparin medications that are used to prevent blood clots in veins can produce bleeding and decreased blood platelets. Infection - Infection is can occur after this surgery. Should an infection occur, treatment including antibiotics, hospitalization, or additional surgery may be necessary. There is a greater risk of infection when body contouring procedures are performed in conjunction with abdominal surgical procedures. Change in Skin Sensation- It is common to experience diminished (or loss) of skin sensation in areas that have had surgery. Diminished (or complete loss of skin sensation) may not totally resolve after an abdominoplasty/pannicular     Skin Contour Irregularities- Contour and shape irregularities and depressions may occur after abdominoplasty. Visible and palpable wrinkling of skin can occur. Residual skin irregularities at the ends of the incisions or dog ears are always a possibility as is skin pleating when there is excessive redundant skin. This may improve with time, or it can be surgically corrected. Major Wound Separation- Wounds may separate after surgery. Should this occur, additional treatment including surgery may be necessary. Skin Discoloration / Swelling- Bruising and swelling normally occurs following panniculectomy. The skin in or near the surgical site can appear either lighter or darker than surrounding skin. Although uncommon, swelling and skin discoloration may persist for long periods of time and, in rare situations, may be permanent. Skin Sensitivity- Itching, tenderness, or exaggerated responses to hot or cold temperatures may occur after surgery. Usually this resolve during healing, but in some situations it may be chronic. Sutures- Most surgical techniques use deep sutures. You may notice these sutures after your surgery.   Sutures may spontaneously poke through the skin, become visible or produce irritation that requires removal.    Damage to Deeper Structures- There is the potential for injury to deeper structures including nerves, blood vessels, muscles, and lungs (pneumothorax), or intra-abdominal injury can occure during any surgical procedure. The potential for this to occur varies according to the type of procedure being performed. Injury to deeper structures may be temporary or permanent. Fat Necrosis- Fatty tissue found deep in the skin might die. This may produce areas of firmness within the skin. Additional surgery to remove areas of fat necrosis may be necessary. There is the possibility of contour irregularities in the skin that may result from fat necrosis. Obesity: If you're BMI is greater than 30 you may have a higher chance of complications. This may include but not limited to wound healing and infections. Also if you have other medical problems such as diabetes and hypertension it may effect your healing as well as your surgical results in bleeding. Umbilicus- Malposition, scarring, unacceptable appearance or loss of the umbilicus (navel) may occur. You may lose the umbilicus when this is combined with a hernia repairs, or due to the strech of the skin the umbilicus has stretched out so much that it can not be salvaged    Pubic Distortion- There will be distortion of their labia and pubic area. Additional treatment including surgery may be necessary. Even with additional surgery she may never have symmetry. At times you may have painful intercourse. Scarring-  All surgery leaves scars, some more visible than others. This surgery will leave large scars. Abnormal scars may occur within the skin and deeper tissues depending on your healing. Scars may be unattractive and of different color than surrounding skin. Scar appearance may also vary within the same scar, exhibit contour variations or \"bunching\" due to the amount of excess skin.   Scars may be asymmetrical (appear different between right and left side of the body). There is the possibility of visible marks in the skin from sutures. In some cases scars may require surgical revision or treatment. Surgical Anesthesia- Both local and general anesthesia involve risk. There is the possibility of complications, injury, and even death from all forms of surgical anesthesia or sedation    Asymmetry-  Symmetrical body appearance may not result from panniculectomy. Factors such as skin tone, fatty deposits, skeletal prominence, and muscle tone may contribute to normal asymmetry in body features. Additional surgery may be necessary to attempt to attempt to improve asymmetry. Allergic Reactions- In some cases, local allergies to tape, suture material and glues, blood products, topical preparations or injected agents have been reported. Serious systemic reactions including shock (anaphylaxis) may occur to drugs used during surgery and prescription medications. Allergic reactions may require additional treatment. Delayed Healing- Wound disruption or delayed wound healing is possible. Some areas of the abdomen may not heal normally and may take a long time to heal.  Some areas of skin or tissue may die. This may require frequent dressing changes or further surgery to remove the non-healed tissue. Smokers have a greater risk of skin loss and wound healing complications. Also patient with certain systemic disease or taking certain medications are at increased risk. Also infections under the pannus may effect your healing. Seroma- Fluid accumulations infrequently occur in between the skin and the abdominal wall. This may require additional procedures for drainage of fluid. Shock- In rare circumstances, your surgical procedure can cause severe trauma, particularly when multiple or extensive procedures are performed.   Although serious complications are infrequent, infections or excessive fluid loss can lead to severe illness and even death.  If surgical shock occurs, hospitalization and additional treatment would be necessary. Surgical Wetting Solutions-There is the possibility that large volumes of fluid containing dilute local anesthetic drugs and epinephrine that is injected into fatty deposits during surgery may contribute to fluid overload or systemic reaction to these medications. Additional treatment including hospitalization may be necessary. Persistent Swelling (Lymphedema)- Persistent swelling in the legs can occur following panniculectomy. Pain- You will experience pain after your surgery. Pain of varying intensity and duration may occur and persist after panniculectomy. Chronic pain may occur very  from nerves becoming trapped in scar tissue after panniculectomy. There may be numbness that can occur after a panniculectomy this could be temporary or permanent    Unsatisfactory Result- There is no guarantee or warranty expressed or implied, on the results that may be obtained. You may be disappointed with the results of panniculectomy surgery. This would include risks such as asymmetry, unsatisfactory or highly visible surgical scar location, unacceptable visible deformities, bunching and rippling in the skin near the suture lines or at the ends of the incisions (dog ears), poor healing, wound disruption, and loss of sensation. It may not be possible to correct or improve the effects of surgical scars. In some situations, it may not be possible to achieve optimal results with a single surgical procedure. Additional surgery may be required to improve results. Deep Venous Thrombosis, Cardiac and Pulmonary Complications- Surgery, especially longer procedures, may be associated with the formation of, or increase in, blood clots in the venous system. Pulmonary complications may occur secondarily to both blood clots (pulmonary emboli), fat deposits (fat emboli) or partial collapse of the lungs after general anesthesia. Pulmonary and fat emboli can be life-threatening or fatal in some circumstances. Air travel, inactivity and other conditions may increase the incidence of blood clots traveling to the lungs causing a major blood clot that may result in death. It is important to discuss with your physician any past history of blood clots, swollen legs or the use of estrogen or birth control pills that may contribute to this condition. Cardiac complications are a risk with any surgery and anesthesia, even in patients without symptoms. Should any of these complications occur, you may require hospitalization and additional treatment. If you experience shortness of breath, chest pains, or unusual heart beats, seek medical attention immediately. ADDITIONAL ADVISORIES    Long-Term Results- Subsequent alterations in the appearance of your body may occur as the result of aging, sun exposure, weight loss, weight gain, pregnancy, menopause or other circumstances not related to your surgery. Metabolic Status of Massive Weight Loss Patients- Your personal metabolic status of blood chemistry and protein levels may be abnormal following massive weight loss and surgical procedures to make a patient loose weight. Individuals with abnormalities may be a risk for serious medical and surgical complications, including delayed wound healing, infection or even in rare cases, death. Body-Piercing Procedures- Individuals who currently wear body-piercing jewelry or are seeking to undergo body-piercing procedures must consider the possibility that an infection could develop anytime following this procedure. Treatment including antibiotics, hospitalization or additional surgery may be necessary. Intimate Relations After Surgery- Surgery involves coagulating of blood vessels and increased activity of any kind may open these vessels leading to a bleed, or hematoma.   Increased activity that increased your pulse or heart rate may cause additional bruising, swelling, and the need for return to surgery and control bleeding. It is wise to refrain from sexual activity until your physician states it is safe. Medications-  There are many adverse reactions that occur as the result of taking over-the-counter, herbal, and/or prescription medications. Be sure to check with your physician about any drug interactions that may exist with medications that you are already taking. If you have an adverse reaction, stop the drugs immediately and call your plastic surgeon for further instructions. If the reaction is severe, go immediately to the nearest emergency room. When taking the prescribed pain medications after surgery, realize that they can affect your thought process and coordination. Do not drive, do not operate complex equipment, do not make any important decisions and do not drink any alcohol while taking these medications. Be sure to take your prescribed medication only as directed. If at blood thinners such as aspirin and Coumadin or Plavix etc. Is prescribed by a physician you must consult with the prescribing physician prior to stopping any of the blood thinners. Our focus is improvement rather than perfection. Complications or less than satisfactory results are sometimes unavoidable, may require additional surgery and often are stressful. Smoking, Second-Hand Smoke Exposure, Nicotine Products (Patch, Gum, Nasal Spray)-   Patients who are currently smoking, use tobacco products, or nicotine products (patch, gum, or nasal spray) are at a greater risk for significant surgical complications of skin dying, delayed healing, and additional scarring. Individuals exposed to second-hand smoke are also at potential risk for similar complications attributable to nicotine exposure. Additionally, smokers may have a significant negative effect on anesthesia and recovery from anesthesia, with coughing and possibly increased bleeding. It is important that you participate in follow-up care, return for aftercare, and promote your recovery after surgery. FINANCIAL RESPONSIBILITIES  The cost of surgery involves several charges for the services provided. The total includes fees charged by your surgeon, the cost of surgical supplies, anesthesia, laboratory tests, and possible hospital charges, depending on where the surgery is performed. Depending on whether the cost of surgery is covered by an insurance plan, you will be responsible for necessary co-payments, deductibles, and charges not covered. The fees charged for this procedure do not include any potential future costs for additional procedures that you elect to have or require in order to revise, optimize, or complete your outcome. Additional costs may occur should complications develop from the surgery. Secondary surgery or hospital day-surgery charges involved with revision surgery will also be your responsibility. HEALTH INSURANCE  Most health insurance companies exclude coverage for cosmetic surgical operations any complications that might occur from surgery. Please carefully review your health insurance subscriber-information pamphlet or contact your insurance company for a detailed explanation of their policies for covering abdominoplasty/panniculectomy procedures. Most insurance plans may exclude coverage for secondary or revisionary surgery. ASPS consent abdominoplasty    GENERAL INFORMATION  Abdominoplasty is a surgical procedure to remove excess skin and fatty tissue from the middle and lower abdomen and to tighten muscles of the abdominal wall. Abdominoplasty is not a surgical treatment for being overweight. Weight changes will affect your body contouring results. You should not have body contouring until you have reached a stable weight. ALTERNATIVE TREATMENTS  Alternative forms of management consist of not treating the areas of loose skin and fatty deposits. Liposuction may be a surgical alternative to abdominoplasty if there is good skin tone and localized abdominal fatty deposits in an individual of normal weight. Diet and exercise programs may be of benefit in the overall reduction of excess body fat and contour improvement. Risks and potential complications are also associated with alternative surgical forms of treatment. INHERENT RISKS OF ABDOMINOPLASTY SURGERY  Every surgical procedure involves a certain amount of risk and it is important that you understand these risks and the possible complications associated with them. In addition, every procedure has limitations. An individual's choice to undergo a surgical procedure is based on the comparison of the risk to potential benefit. SPECIFIC RISKS OF ABDOMINOPLASTY SURGERY  Change in Skin Sensation: It is common to experience diminished (or loss) of skin sensation in areas that have had surgery. Diminished (or complete loss of skin sensation) may not totally resolve after an abdominoplasty. Skin Contour Irregularities:  Contour and shape irregularities and depressions may occur after abdominoplasty. Visible and palpable wrinkling of skin can occur. Residual skin irregularities at the ends of the incisions or dog ears are always a possibility as is skin pleating when there is excessive redundant skin. This may improve with time, or it can be surgically corrected. Major Wound Separation:  Wounds may separate after surgery. Should this occur, additional treatment including surgery may be necessary. Umbilicus: Malposition, scarring, unacceptable appearance or loss of the umbilicus (navel) may occur. Pubic Distortion: It is possible, though unusual, for women to develop distortion of their labia and pubic area. Should this occur, additional treatment including surgery may be necessary.     Use of Platelet Gel or Fibrin Sealants Tissue Glue:  Platelet Gel (from your blood) and Fibrin sealants (from heat-treated human blood components to  inactivate virus transmission) may be used to hold tissue layers together at surgery and to diminish postoperative bruising following an abdominoplasty. Sealants have been carefully produced from screened donor blood plasma for hepatitis, syphilis, and human immunodeficiency virus (HIV). These products  have been used safely for many years as sealants in cardiovascular and general surgery. This product is thought to be of help in diminishing surgical bleeding and by adhering layers of tissue together. GENERAL RISKS OF SURGERY  Healing Issues:  Certain medical conditions, dietary supplements and medications may delay and interfere with healing. Patients with massive weight loss may have a healing delay that could result in the incisions coming apart, infection, and tissue changes resulting in the need for additional medical care, surgery, and prolonged hospitalizations. Patients with diabetes or those taking medications such as steroids on an extended basis may have prolonged healing issues. Smoking will cause a delay in the healing process, often resulting in the need for additional surgery. There are general risks associated with healing such as swelling, bleeding, possibility of additional surgery, prolonged recovery, color changes, shape changes, infection, not meeting your goals and expectations, and added expense to the patient. There may also be a longer recovery due to the length of surgery and anesthesia. Patients with significant skin laxity (patients seeking facelifts, breast lifts, abdominoplasty, and body lifts) will continue to have the same lax  skin after surgery. The quality or elasticity of skin will not change, and recurrence of skin looseness will occur at some time in the future, quicker for some than others.  There are nerve endings that may become involved with healing scars from surgery such as suction-assisted lipectomy, abdominoplasty, facelifts, body lifts, and extremity surgery. While there may not be a major nerve injury, the small nerve endings during the healing period may become too active producing a painful or oversensitive area due to the small sensory nerve involved with scar tissue. Often, massage and early non-surgical intervention resolves this. It is important to discuss post-surgical pain with your surgeon. Bleeding: It is possible, to experience a bleeding episode during or after surgery. Should postoperative  bleeding occur, it may require emergency treatment to drain accumulated blood or you may  require a blood transfusion. Increased activity too soon after surgery  can lead to increased chance of bleeding and additional surgery. It is important to follow postoperative instructions and limit exercise and strenuous activity for the instructed time. Do not take any aspirin or anti-inflammatory medications for at least ten days before or after surgery, as this may increase the risk of bleeding. Non-prescription herbs and dietary supplements can increase the risk of surgical bleeding. Hematoma can occur at any time, usually in the first three weeks following injury to the operative area. If blood transfusions are necessary to treat blood loss, there is the risk of blood-related infections such as hepatitis and HIV (AIDS). Heparin medications that are used to prevent blood clots in veins can produce bleeding and decreased blood platelets. Infection:  Infection can occur after surgery. Should an infection occur, additional treatment including antibiotics, hospitalization, or additional surgery may be necessary. It is important to tell your surgeon of any other infections, such as ingrown toenail, insect bite, or urinary tract infection. Remote infections, infection in other part of the body, may lead to an infection in the operated area. Scarring: All surgery leaves scars, some more visible than others.  Although wound healing after a surgical  procedure is expected, abnormal scars may occur within the skin and deeper tissues. Scars may be unattractive and of different color than the surrounding skin tone. Scar appearance may also vary within the same scar. Scars may be asymmetrical (appear different on the right and left side of the body). There is the possibility of visible marks in the skin from sutures. In some cases, scars may require surgical revision or treatment. Firmness:  Excessive firmness can occur after surgery due to internal scarring. The occurrence of this is not  predictable. Additional treatment including surgery may be necessary. Change in Skin Sensation: It is common to experience diminished (or loss) of skin sensation in areas that have had surgery. Diminished (or complete loss of skin sensation) may not totally resolve. Skin Contour Irregularities:  Contour and shape irregularities may occur. Visible and palpable wrinkling of skin may occur. Residual  skin irregularities at the ends of the incisions or dog ears are always a possibility when there is  excessive redundant skin. This may improve with time, or it can be surgically corrected. Skin Discoloration / Swelling:  Some bruising and swelling will normally occur. The skin in or near the surgical site can appear either lighter or darker than surrounding skin. Although uncommon, swelling and skin discoloration may persist for long periods of time and, in rare situations, may be permanent. Skin Sensitivity:  Itching, tenderness, or exaggerated responses to hot or cold temperatures may occur after surgery. Usually this resolves during healing, but in some situations it may be chronic. Major Wound Separation:  Wounds may separate after surgery. Should this occur, additional treatment including surgery may be necessary. Sutures:  Most surgical techniques use deep sutures. You may notice these sutures after your surgery.  Sutures may spontaneously poke through the skin, become visible or produce irritation that requires suture removal.  Delayed Healing:  Wound disruption or delayed wound healing is possible. Some areas of the skin may not heal normally and may take a long time to heal. Areas of skin may die. This may require frequent dressing changes or further surgery to remove the non-healed tissue. Individuals who have decreased blood supply to tissue from past surgery or radiation therapy may be at increased risk for wound healing and poor surgical outcome. Smokers have a greater risk of skin loss and wound healing complications. Damage to Deeper Structures: There is the potential for injury to deeper structures including nerves, blood vessels, muscles, and lungs (pneumothorax) during any surgical procedure. The potential for this to occur varies according to the type of procedure being performed. Injury to deeper structures may be temporary or permanent. Fat Necrosis:  Fatty tissue found deep in the skin might die. This may produce areas of firmness within the skin. Additional surgery to remove areas of fat necrosis may be necessary. There is the possibility of contour irregularities in the skin that may result from fat necrosis. Seroma:  Fluid may accumulate between the skin and the underlying tissues following surgery, trauma  or vigorous exercise. Should this problem occur, it may require additional procedures for drainage of fluid. Surgical Anesthesia:  Both local and general anesthesia involves risk. There is the possibility of complications, injury, and even death from all forms of surgical anesthesia or sedation. Shock: In rare circumstances, your surgical procedure can cause severe trauma, particularly when multiple or extensive procedures are performed. Although serious complications can occur infections or excessive fluid loss can lead to severe illness and even death.  If surgical shock occurs, hospitalization and additional treatment would be necessary. Pain:  You will experience pain after your surgery. Pain of varying intensity and duration may occur and persist after surgery. Chronic pain may occur very infrequently from nerves becoming trapped in scar tissue or due to tissue stretching. Cardiac and Pulmonary Complications:  Pulmonary complications may occur secondarily to blood clots (pulmonary emboli), fat deposits (fat emboli) or partial collapse of the lungs after general anesthesia. Pulmonary emboli can be life threatening or fatal in some circumstances. Inactivity and other conditions may increase the incidence of blood clots traveling to the lungs causing a major blood clot that may result in death. It is important to discuss if you have any past history of swelling in your legs or blood clots that may contribute to this condition. Cardiac complications are a risk with any surgery and anesthesia, even in patients without symptoms. If you experience shortness of breath, chest pains, or unusual heart beats, seek medical attention immediately. Should any of these complications occur, you may require hospitalization and additional treatment. Venous Thrombosis and Sequelae: Thrombosed veins, which resemble cords, occasionally develop in the area of the breast or around IV sites, and usually resolve without medical or surgical treatment. It is important to discuss with me surgeon any birth control pills you are taking. Certain high estrogen pills may increase your risk of thrombosed veins. Allergic Reactions:  In Some cases, local allergies to tape, suture material and glues, blood products, topical preparations or injected agents have been reported. Serious systemic reactions including shock (anaphylaxis) may occur in response to drugs used during surgery and prescription medicines. Allergic reactions may require additional treatment.   Drug Reactions:  Unexpected drug allergies, lack of proper response to medication, or illness caused by the prescribed drug are possibilities. It is important for you to inform your physician of any problems you have had with any medication or allergies to medication, prescribed or over the counter, as well as medications you now regularly take. Asymmetry:  Symmetrical body appearance may not result after surgery. Factors such as skin tone, fatty deposits, skeletal prominence, and muscle tone may contribute to normal asymmetry in body features. Most patients have differences between the right and left side of their bodies before any surgery is performed. Additional surgery may be necessary to attempt to diminish asymmetry. Surgical Wetting Solutions: There is the possibility that large volumes of fluid containing dilute local anesthetic drugs and epinephrine that is injected into fatty deposits during surgery may contribute to fluid overload or systemic reaction to these medications. Additional treatment including hospitalization may be necessary. Persistent Swelling (Lymphedema):  Persistent swelling can occur following surgery. Unsatisfactory Result:  Although results are expected, there is no guarantee or warranty expressed or implied, on the  results that may be obtained. The body is not asymmetric and almost everyone has some degree of unevenness which may not be recognized in advance. One side of the face may be slightly larger, one side of the face droopier. The breast and trunk area exhibit the same possibilities. Many of such issues cannot be fully corrected with surgery. The more realistic your expectations as to results, the better your results will be in your eye. Some patients never achieve their desired goals or results, at no fault of the surgeon or surgery. You may be disappointed with the results of surgery. Asymmetry, unanticipated shape and size, loss of function, wound disruption, poor healing, and loss of sensation may occur after surgery. Size may be incorrect.  Unsatisfactory surgical scar location or appearance may occur. It may be  necessary to perform additional surgery to improve your results. ADDITIONAL ADVISORIES  Smoking, Second-Hand Smoke Exposure, Nicotine Products (Patch, Gum, Nasal Spray):  Patients who are currently smoking or use tobacco or nicotine products (patch, gum, or nasal spray) are at a greater risk for significant surgical complications of skin dying and delayed healing and additional scarring. Individuals exposed to second-hand smoke are also at potential risk for similar complications attributable to nicotine exposure. Additionally, smoking may have a significant negative effect on anesthesia and recovery from anesthesia, with coughing and possibly increased bleeding. Individuals who are not exposed to tobacco smoke or nicotine-containing products have a significantly lower risk of   this type of complication. I acknowledge that I will inform my physician if I continue to smoke within this time frame, and understand that for my safety, the surgery, if possible, may be delayed. Smoking may have such a negative effect on your surgery that a urine test just before surgery may be done which will prove the presence of Nicotine. If positive, your surgery may be cancelled and your surgery,   Sleep Apnea / CPAP:   Individuals who have breathing disorders such as Obstructive Sleep Apnea and who may rely upon CPAP devices (constant positive airway pressure) or utilize nighttime oxygen are advised that they are at a substantive risk for respiratory arrest and death when they take narcotic pain medications following surgery. This is an important consideration when evaluating the safety of surgical procedures in terms of very serious complications, including death, that relate to pre-existing medical conditions.  Surgery may be considered only with monitoring afterwards in a hospital setting in order to reduce risk of potential respiratory complications and to safely manage pain following surgery. Medications and Herbal Dietary Supplements: There are potential adverse reactions that occur as the result of taking over the counter, herbal, and/or prescription medications. Aspirin and medications that contain aspirin interfere with bleeding. These include non-steroidal anti-inflammatories such as Motrin, Advil, and Aleve. It is very important not to stop drugs that interfere with platelets, such as Plavix, which is used after a stent. It is important if you have had a stent and are taking Plavix that you inform the plastic surgeon. Stopping Plavix may result in a heart attack, stroke and even death. Be sure to check with your prescribing physician prior to stopping any medications. You may have drug interactions that may exist with medications which you are already taking. If you have an adverse reaction, stop the drugs immediately and call for instructions. If the reaction is severe, go immediately to the nearest emergency room. When taking the prescribed pain medications after surgery, realize that they can affect your thought process and coordination. Do not drive, do not operate complex equipment, do not make any important decisions and do not drink any alcohol while taking these medications. Be sure to take your prescribed medication only as directed. Sun Exposure - Direct or Tanning Salon:  The effects of the sun are damaging to the skin. Exposing the treated areas to sun may result in  increased scarring, color changes, and poor healing. Patients who tan, either outdoors or in a salon, should inform their surgeon and either delay treatment, or avoid tanning until the you are told that it is safe to resume. The damaging effect of sun exposure occurs even with the use sun block or clothing coverage. Travel Plans:  Any surgery holds the risk of complications that may delay healing and your return to normal life.  Please let the surgeon know of any travel plans, important commitments already scheduled or planned, or time demands that are important to you, so that appropriate timing of surgery can occur. There are no guarantees that you will be able to resume all activities in the desired time frame. Long-Term Results:  Subsequent alterations in the appearance of your body may occur as the result of aging, sun exposure, weight loss, weight gain, pregnancy, menopause or other circumstances not related to your surgery. Body-Piercing Procedures:  Individuals who currently wear body-piercing jewelry in the surgical region are advised that an infection could develop from this activity. Intimate Relations After Surgery:  Surgery involves coagulating of blood vessels and increased activity of any kind may open these vessels leading to a bleed, or hematoma. Activity that increases your pulse or heart rate may cause additional bruising, swelling, and the need for return to surgery to control bleeding. It is wise to refrain from intimate physical activities until your physician states it is safe. Mental Health Disorders and Elective Surgery: It is important that all patients seeking to undergo elective surgery have realistic expectations that focus on improvement rather than perfection. Complications or less than satisfactory results are sometimes unavoidable, may require additional surgery and often are stressful. lthough many individuals may benefit psychologically from the results of elective  surgery, effects on mental health cannot be accurately predicted. DVT/PE Risks and Advisory: There is a risk of blood clots, Deep Vein Thrombosis (DVT) and Pulmonary Embolus (PE) with every surgical procedure. It varies with the risk factors below. The higher the risk factors, the greater the risk and the more involved you must be in both understanding these risks and, when permitted by your physician, walking and moving your legs.  There may also be leg stockings, squeezing active leg devices, and possibly medicines to help lower your risk. There are many conditions that may increase or affect risks of clotting. Inform your doctor about any past or present history of any of the following:  Past History of Blood Clots  Family History of Blood Clots  Birth Control Pills  Swollen Legs  History of Cancer  Large Dose Vitamins  Varicose Veins  Past Illnesses of the Heart, Liver, Lung, or Gastrointestinal Tract. I understand the risks relating to DVT/PE and how important it is to comply with therapy as  discussed with my surgeon. The methods of preventative therapy include:  Early ambulation when allowed  Compression devices (SCD/ICD)  ASA protocol when allowed (Aspirin)  Heparin protocol when allowed  Enoxaparin protocol when allowed  The risks of DVT/PE may be almost as great as the prophylactic therapy when involving Aspirin, Heparin,  and Exoxaparin. Be aware that if your surgery is elective, those patients with very high risks should consider not proceeding with such elective surgery. ADDITIONAL SURGERY NECESSARY (Re-Operations)  There are many variable conditions that may influence the long-term result of surgery. It is unknown how your tissue may respond or how wound healing will occur after surgery. Secondary surgery may be necessary to perform additional tightening or repositioning of body structures. Should complications occur, additional surgery or other treatments may be necessary. Even though risks and complications occur infrequently, the risks cited are particularly associated with this surgery. Other complications and risks can occur but are even more uncommon. The practice of medicine and surgery is not an exact science. Although good results are expected, there is no guarantee or warranty expressed or implied, on the results that may be obtained. In some situations, it may not be possible to achieve optimal results with a single surgical procedure.  You and your surgeon will discuss the options available should  additional surgery be advised. There may be additional costs and expenses for such additional  procedures, including surgical fees, facility and anesthesia fees, pathology and lab testing. PATIENT COMPLIANCE  Follow all physician instructions carefully; this is essential for the success of your outcome. It is important that the surgical incisions are not subjected to excessive force, swelling, abrasion, or motion during the time of healing. Personal and vocational activity needs to be restricted. Protective dressings and drains should not be removed unless instructed. Successful post-operative function depends on both surgery and subsequent care. Physical activity that increases your pulse or heart rate may cause bruising, swelling, fluid accumulation and the need for return to surgery. It is wise to refrain from intimate physical activities after surgery until your physician states it is safe. It is important that you participate in follow-up care, return for aftercare, and promote your recovery after surgery. REVISION POLICY  Surgical revision surgery is a common part of elective surgery. Your procedure will not stop you from aging, sagging, scarring, or experiencing ongoing skin changes that are more genetically controlled. If revision surgery is either desired or advisable within one year after the initial surgery, there may be a Fees associated with it. HEALTH INSURANCE  Most health insurance companies exclude coverage for cosmetic surgical operations or any resulting complications. Please carefully review your health insurance subscriber-information pamphlet. Most insurance plans exclude coverage for secondary or revisionary surgery due to complications of cosmetic surgery. It is unethical and fraudulent to bill insurance for cosmetic procedures. We cannot participate in such activities.   .        Electronically signed by:  Tali Saxena MD 8/25/2021

## 2021-10-20 VITALS — HEIGHT: 66 IN | BODY MASS INDEX: 27.64 KG/M2 | WEIGHT: 172 LBS

## 2021-10-20 NOTE — PROGRESS NOTES
Preoperative Instructions:    Stop eating solid foods at midnight the night prior to your surgery. Stop drinking clear liquids at midnight the night prior to your surgery. Arrive at the surgery center (3rd entrance) on _______11/4________ by _______1000________. Please stop any blood thinning medications as directed by your surgeon or prescribing physician. Failure to stop certain medications may interfere with your scheduled surgery. These may include: Aspirin, Coumadin, Plavix, NSAIDS (Motrin, Aleve, Advil, Mobic, Celebrex), Eliquis, Pradaxa, Xarelto, Fish oil, and herbal supplements. You may continue the rest of your medications through the night before surgery unless instructed otherwise. Reminders:  -If you are going home the day of your procedure, you will need a family member or friend to stay during the procedure and drive you home after your procedure. Your  must be 25years of age or older and able to sign off on your discharge instructions.    -If you are going home the same day of your surgery, someone must remain with you for the first 24 hours after your surgery if you receive sedation or anesthesia.      -Please do not wear any jewelery or body piercing the day of surgery

## 2021-10-21 ENCOUNTER — HOSPITAL ENCOUNTER (OUTPATIENT)
Dept: PREADMISSION TESTING | Age: 51
Discharge: HOME | End: 2021-10-21
Payer: COMMERCIAL

## 2021-10-26 ENCOUNTER — HOSPITAL ENCOUNTER (OUTPATIENT)
Age: 51
Setting detail: SPECIMEN
Discharge: HOME | End: 2021-10-26
Payer: COMMERCIAL

## 2021-10-26 DIAGNOSIS — Z98.84 STATUS POST GASTRIC BYPASS FOR OBESITY: ICD-10-CM

## 2021-10-26 DIAGNOSIS — F41.9 ANXIETY: ICD-10-CM

## 2021-10-26 DIAGNOSIS — Z13.220 SCREENING FOR HYPERLIPIDEMIA: ICD-10-CM

## 2021-10-26 LAB
ALBUMIN SERPL-MCNC: 4.8 G/DL (ref 3.5–5.2)
ALBUMIN/GLOBULIN RATIO: ABNORMAL (ref 1–2.5)
ALP BLD-CCNC: 78 U/L (ref 35–104)
ALT SERPL-CCNC: 12 U/L (ref 5–33)
ANION GAP SERPL CALCULATED.3IONS-SCNC: 10 MMOL/L (ref 9–17)
AST SERPL-CCNC: 17 U/L
BILIRUB SERPL-MCNC: 0.5 MG/DL (ref 0.3–1.2)
BUN BLDV-MCNC: 9 MG/DL (ref 6–20)
BUN/CREAT BLD: ABNORMAL (ref 9–20)
CALCIUM SERPL-MCNC: 9.2 MG/DL (ref 8.6–10.4)
CHLORIDE BLD-SCNC: 109 MMOL/L (ref 98–107)
CHOLESTEROL, FASTING: 157 MG/DL
CHOLESTEROL/HDL RATIO: 3.7
CO2: 28 MMOL/L (ref 20–31)
CREAT SERPL-MCNC: 0.75 MG/DL (ref 0.5–0.9)
FOLATE: 7.8 NG/ML
GFR AFRICAN AMERICAN: >60 ML/MIN
GFR NON-AFRICAN AMERICAN: >60 ML/MIN
GFR SERPL CREATININE-BSD FRML MDRD: ABNORMAL ML/MIN/{1.73_M2}
GFR SERPL CREATININE-BSD FRML MDRD: ABNORMAL ML/MIN/{1.73_M2}
GLUCOSE BLD-MCNC: 93 MG/DL (ref 70–99)
HCT VFR BLD CALC: 42.3 % (ref 36–46)
HDLC SERPL-MCNC: 43 MG/DL
HEMOGLOBIN: 13.9 G/DL (ref 12–16)
LDL CHOLESTEROL: 97 MG/DL (ref 0–130)
MCH RBC QN AUTO: 28.2 PG (ref 26–34)
MCHC RBC AUTO-ENTMCNC: 32.8 G/DL (ref 31–37)
MCV RBC AUTO: 85.7 FL (ref 80–100)
NRBC AUTOMATED: NORMAL PER 100 WBC
PDW BLD-RTO: 13.3 % (ref 11.5–14.9)
PLATELET # BLD: 278 K/UL (ref 150–450)
PMV BLD AUTO: 7.3 FL (ref 6–12)
POTASSIUM SERPL-SCNC: 3.8 MMOL/L (ref 3.7–5.3)
RBC # BLD: 4.93 M/UL (ref 4–5.2)
SODIUM BLD-SCNC: 147 MMOL/L (ref 135–144)
TOTAL PROTEIN: 7.3 G/DL (ref 6.4–8.3)
TRIGLYCERIDE, FASTING: 87 MG/DL
TSH SERPL DL<=0.05 MIU/L-ACNC: 2.68 MIU/L (ref 0.3–5)
VITAMIN B-12: 484 PG/ML (ref 232–1245)
VLDLC SERPL CALC-MCNC: NORMAL MG/DL (ref 1–30)
WBC # BLD: 5.9 K/UL (ref 3.5–11)

## 2021-10-26 PROCEDURE — 83036 HEMOGLOBIN GLYCOSYLATED A1C: CPT

## 2021-10-26 PROCEDURE — 82746 ASSAY OF FOLIC ACID SERUM: CPT

## 2021-10-26 PROCEDURE — 84443 ASSAY THYROID STIM HORMONE: CPT

## 2021-10-26 PROCEDURE — 80053 COMPREHEN METABOLIC PANEL: CPT

## 2021-10-26 PROCEDURE — 85027 COMPLETE CBC AUTOMATED: CPT

## 2021-10-26 PROCEDURE — 82607 VITAMIN B-12: CPT

## 2021-10-26 PROCEDURE — 36415 COLL VENOUS BLD VENIPUNCTURE: CPT

## 2021-10-26 PROCEDURE — 80061 LIPID PANEL: CPT

## 2021-10-27 LAB
ESTIMATED AVERAGE GLUCOSE: 91 MG/DL
HBA1C MFR BLD: 4.8 % (ref 4–6)

## 2021-10-31 ENCOUNTER — HOSPITAL ENCOUNTER (OUTPATIENT)
Dept: LAB | Age: 51
Setting detail: SPECIMEN
Discharge: HOME | End: 2021-10-31
Payer: COMMERCIAL

## 2021-10-31 DIAGNOSIS — Z01.818 PREOP TESTING: Primary | ICD-10-CM

## 2021-10-31 PROCEDURE — U0005 INFEC AGEN DETEC AMPLI PROBE: HCPCS

## 2021-10-31 PROCEDURE — U0003 INFECTIOUS AGENT DETECTION BY NUCLEIC ACID (DNA OR RNA); SEVERE ACUTE RESPIRATORY SYNDROME CORONAVIRUS 2 (SARS-COV-2) (CORONAVIRUS DISEASE [COVID-19]), AMPLIFIED PROBE TECHNIQUE, MAKING USE OF HIGH THROUGHPUT TECHNOLOGIES AS DESCRIBED BY CMS-2020-01-R: HCPCS

## 2021-11-01 LAB
SARS-COV-2: NORMAL
SARS-COV-2: NOT DETECTED
SOURCE: NORMAL

## 2021-11-03 ENCOUNTER — ANESTHESIA EVENT (OUTPATIENT)
Dept: OPERATING ROOM | Age: 51
End: 2021-11-03
Payer: COMMERCIAL

## 2021-11-04 ENCOUNTER — ANESTHESIA (OUTPATIENT)
Dept: OPERATING ROOM | Age: 51
End: 2021-11-04
Payer: COMMERCIAL

## 2021-11-04 ENCOUNTER — HOSPITAL ENCOUNTER (OUTPATIENT)
Age: 51
Setting detail: OUTPATIENT SURGERY
Discharge: HOME | End: 2021-11-04
Attending: PLASTIC SURGERY | Admitting: PLASTIC SURGERY
Payer: COMMERCIAL

## 2021-11-04 VITALS
BODY MASS INDEX: 28.25 KG/M2 | SYSTOLIC BLOOD PRESSURE: 108 MMHG | HEART RATE: 80 BPM | WEIGHT: 175 LBS | RESPIRATION RATE: 19 BRPM | TEMPERATURE: 96.8 F | OXYGEN SATURATION: 97 % | DIASTOLIC BLOOD PRESSURE: 71 MMHG

## 2021-11-04 VITALS — DIASTOLIC BLOOD PRESSURE: 56 MMHG | SYSTOLIC BLOOD PRESSURE: 99 MMHG | OXYGEN SATURATION: 97 % | TEMPERATURE: 96.8 F

## 2021-11-04 DIAGNOSIS — G89.18 POST-OP PAIN: Primary | ICD-10-CM

## 2021-11-04 PROCEDURE — 6360000002 HC RX W HCPCS

## 2021-11-04 PROCEDURE — 7100000010 HC PHASE II RECOVERY - FIRST 15 MIN: Performed by: PLASTIC SURGERY

## 2021-11-04 PROCEDURE — 2580000003 HC RX 258: Performed by: ANESTHESIOLOGY

## 2021-11-04 PROCEDURE — 3600000002 HC SURGERY LEVEL 2 BASE: Performed by: PLASTIC SURGERY

## 2021-11-04 PROCEDURE — 6360000002 HC RX W HCPCS: Performed by: ANESTHESIOLOGY

## 2021-11-04 PROCEDURE — 88305 TISSUE EXAM BY PATHOLOGIST: CPT

## 2021-11-04 PROCEDURE — 7100000001 HC PACU RECOVERY - ADDTL 15 MIN: Performed by: PLASTIC SURGERY

## 2021-11-04 PROCEDURE — 64488 TAP BLOCK BI INJECTION: CPT | Performed by: ANESTHESIOLOGY

## 2021-11-04 PROCEDURE — 6370000000 HC RX 637 (ALT 250 FOR IP)

## 2021-11-04 PROCEDURE — 2500000003 HC RX 250 WO HCPCS: Performed by: NURSE ANESTHETIST, CERTIFIED REGISTERED

## 2021-11-04 PROCEDURE — 2580000003 HC RX 258: Performed by: PLASTIC SURGERY

## 2021-11-04 PROCEDURE — 7100000000 HC PACU RECOVERY - FIRST 15 MIN: Performed by: PLASTIC SURGERY

## 2021-11-04 PROCEDURE — 6360000002 HC RX W HCPCS: Performed by: NURSE ANESTHETIST, CERTIFIED REGISTERED

## 2021-11-04 PROCEDURE — 2709999900 HC NON-CHARGEABLE SUPPLY: Performed by: PLASTIC SURGERY

## 2021-11-04 PROCEDURE — 3700000000 HC ANESTHESIA ATTENDED CARE: Performed by: PLASTIC SURGERY

## 2021-11-04 PROCEDURE — 7100000011 HC PHASE II RECOVERY - ADDTL 15 MIN: Performed by: PLASTIC SURGERY

## 2021-11-04 PROCEDURE — 2500000003 HC RX 250 WO HCPCS: Performed by: ANESTHESIOLOGY

## 2021-11-04 PROCEDURE — 6360000002 HC RX W HCPCS: Performed by: PLASTIC SURGERY

## 2021-11-04 PROCEDURE — C9290 INJ, BUPIVACAINE LIPOSOME: HCPCS | Performed by: ANESTHESIOLOGY

## 2021-11-04 PROCEDURE — 2720000010 HC SURG SUPPLY STERILE: Performed by: PLASTIC SURGERY

## 2021-11-04 PROCEDURE — 15830 EXC EXCESSIVE SKIN ABDOMEN: CPT | Performed by: PLASTIC SURGERY

## 2021-11-04 PROCEDURE — 6370000000 HC RX 637 (ALT 250 FOR IP): Performed by: PLASTIC SURGERY

## 2021-11-04 PROCEDURE — 3700000001 HC ADD 15 MINUTES (ANESTHESIA): Performed by: PLASTIC SURGERY

## 2021-11-04 PROCEDURE — 3600000012 HC SURGERY LEVEL 2 ADDTL 15MIN: Performed by: PLASTIC SURGERY

## 2021-11-04 PROCEDURE — 15847 EXC SKIN ABD ADD-ON: CPT | Performed by: PLASTIC SURGERY

## 2021-11-04 RX ORDER — DEXAMETHASONE SODIUM PHOSPHATE 10 MG/ML
INJECTION INTRAMUSCULAR; INTRAVENOUS PRN
Status: DISCONTINUED | OUTPATIENT
Start: 2021-11-04 | End: 2021-11-04 | Stop reason: SDUPTHER

## 2021-11-04 RX ORDER — HYDROCODONE BITARTRATE AND ACETAMINOPHEN 5; 325 MG/1; MG/1
1 TABLET ORAL PRN
Status: COMPLETED | OUTPATIENT
Start: 2021-11-04 | End: 2021-11-04

## 2021-11-04 RX ORDER — LIDOCAINE HYDROCHLORIDE 10 MG/ML
1 INJECTION, SOLUTION EPIDURAL; INFILTRATION; INTRACAUDAL; PERINEURAL
Status: DISCONTINUED | OUTPATIENT
Start: 2021-11-04 | End: 2021-11-04 | Stop reason: HOSPADM

## 2021-11-04 RX ORDER — BACLOFEN 10 MG/1
10 TABLET ORAL 3 TIMES DAILY
Qty: 30 TABLET | Refills: 0 | Status: ON HOLD | OUTPATIENT
Start: 2021-11-04 | End: 2022-01-03 | Stop reason: HOSPADM

## 2021-11-04 RX ORDER — GABAPENTIN 300 MG/1
300 CAPSULE ORAL 3 TIMES DAILY
Qty: 60 CAPSULE | Refills: 0 | Status: SHIPPED | OUTPATIENT
Start: 2021-11-04 | End: 2022-03-31

## 2021-11-04 RX ORDER — ROCURONIUM BROMIDE 10 MG/ML
INJECTION, SOLUTION INTRAVENOUS PRN
Status: DISCONTINUED | OUTPATIENT
Start: 2021-11-04 | End: 2021-11-04 | Stop reason: SDUPTHER

## 2021-11-04 RX ORDER — ONDANSETRON 2 MG/ML
4 INJECTION INTRAMUSCULAR; INTRAVENOUS
Status: COMPLETED | OUTPATIENT
Start: 2021-11-04 | End: 2021-11-04

## 2021-11-04 RX ORDER — SODIUM CHLORIDE, SODIUM LACTATE, POTASSIUM CHLORIDE, CALCIUM CHLORIDE 600; 310; 30; 20 MG/100ML; MG/100ML; MG/100ML; MG/100ML
INJECTION, SOLUTION INTRAVENOUS CONTINUOUS
Status: DISCONTINUED | OUTPATIENT
Start: 2021-11-04 | End: 2021-11-04 | Stop reason: HOSPADM

## 2021-11-04 RX ORDER — FENTANYL CITRATE 50 UG/ML
INJECTION, SOLUTION INTRAMUSCULAR; INTRAVENOUS
Status: COMPLETED
Start: 2021-11-04 | End: 2021-11-04

## 2021-11-04 RX ORDER — CEFAZOLIN SODIUM 2 G/50ML
SOLUTION INTRAVENOUS PRN
Status: DISCONTINUED | OUTPATIENT
Start: 2021-11-04 | End: 2021-11-04 | Stop reason: SDUPTHER

## 2021-11-04 RX ORDER — HYDRALAZINE HYDROCHLORIDE 20 MG/ML
5 INJECTION INTRAMUSCULAR; INTRAVENOUS EVERY 10 MIN PRN
Status: DISCONTINUED | OUTPATIENT
Start: 2021-11-04 | End: 2021-11-04 | Stop reason: HOSPADM

## 2021-11-04 RX ORDER — GLYCOPYRROLATE 1 MG/5 ML
SYRINGE (ML) INTRAVENOUS PRN
Status: DISCONTINUED | OUTPATIENT
Start: 2021-11-04 | End: 2021-11-04 | Stop reason: SDUPTHER

## 2021-11-04 RX ORDER — PROMETHAZINE HYDROCHLORIDE 25 MG/ML
6.25 INJECTION, SOLUTION INTRAMUSCULAR; INTRAVENOUS
Status: DISCONTINUED | OUTPATIENT
Start: 2021-11-04 | End: 2021-11-04 | Stop reason: HOSPADM

## 2021-11-04 RX ORDER — MIDAZOLAM HYDROCHLORIDE 1 MG/ML
INJECTION INTRAMUSCULAR; INTRAVENOUS
Status: COMPLETED
Start: 2021-11-04 | End: 2021-11-04

## 2021-11-04 RX ORDER — MEPERIDINE HYDROCHLORIDE 50 MG/ML
12.5 INJECTION INTRAMUSCULAR; INTRAVENOUS; SUBCUTANEOUS EVERY 5 MIN PRN
Status: DISCONTINUED | OUTPATIENT
Start: 2021-11-04 | End: 2021-11-04 | Stop reason: HOSPADM

## 2021-11-04 RX ORDER — SODIUM CHLORIDE 9 MG/ML
25 INJECTION, SOLUTION INTRAVENOUS PRN
Status: DISCONTINUED | OUTPATIENT
Start: 2021-11-04 | End: 2021-11-04 | Stop reason: HOSPADM

## 2021-11-04 RX ORDER — LIDOCAINE HYDROCHLORIDE 10 MG/ML
INJECTION, SOLUTION EPIDURAL; INFILTRATION; INTRACAUDAL; PERINEURAL PRN
Status: DISCONTINUED | OUTPATIENT
Start: 2021-11-04 | End: 2021-11-04 | Stop reason: SDUPTHER

## 2021-11-04 RX ORDER — BUPIVACAINE HYDROCHLORIDE 2.5 MG/ML
INJECTION, SOLUTION EPIDURAL; INFILTRATION; INTRACAUDAL
Status: COMPLETED | OUTPATIENT
Start: 2021-11-04 | End: 2021-11-04

## 2021-11-04 RX ORDER — NEOSTIGMINE METHYLSULFATE 5 MG/5 ML
SYRINGE (ML) INTRAVENOUS PRN
Status: DISCONTINUED | OUTPATIENT
Start: 2021-11-04 | End: 2021-11-04 | Stop reason: SDUPTHER

## 2021-11-04 RX ORDER — SODIUM CHLORIDE 0.9 % (FLUSH) 0.9 %
10 SYRINGE (ML) INJECTION PRN
Status: DISCONTINUED | OUTPATIENT
Start: 2021-11-04 | End: 2021-11-04 | Stop reason: HOSPADM

## 2021-11-04 RX ORDER — HYDROCODONE BITARTRATE AND ACETAMINOPHEN 5; 325 MG/1; MG/1
TABLET ORAL
Status: COMPLETED
Start: 2021-11-04 | End: 2021-11-04

## 2021-11-04 RX ORDER — FENTANYL CITRATE 50 UG/ML
25 INJECTION, SOLUTION INTRAMUSCULAR; INTRAVENOUS EVERY 5 MIN PRN
Status: DISCONTINUED | OUTPATIENT
Start: 2021-11-04 | End: 2021-11-04 | Stop reason: HOSPADM

## 2021-11-04 RX ORDER — PROPOFOL 10 MG/ML
INJECTION, EMULSION INTRAVENOUS PRN
Status: DISCONTINUED | OUTPATIENT
Start: 2021-11-04 | End: 2021-11-04 | Stop reason: SDUPTHER

## 2021-11-04 RX ORDER — FENTANYL CITRATE 50 UG/ML
INJECTION, SOLUTION INTRAMUSCULAR; INTRAVENOUS PRN
Status: DISCONTINUED | OUTPATIENT
Start: 2021-11-04 | End: 2021-11-04 | Stop reason: SDUPTHER

## 2021-11-04 RX ORDER — ONDANSETRON 2 MG/ML
INJECTION INTRAMUSCULAR; INTRAVENOUS
Status: COMPLETED
Start: 2021-11-04 | End: 2021-11-04

## 2021-11-04 RX ORDER — CEPHALEXIN 500 MG/1
500 CAPSULE ORAL 4 TIMES DAILY
Qty: 40 CAPSULE | Refills: 0 | Status: ON HOLD | OUTPATIENT
Start: 2021-11-04 | End: 2022-01-03 | Stop reason: HOSPADM

## 2021-11-04 RX ORDER — SODIUM CHLORIDE 9 MG/ML
INJECTION INTRAVENOUS
Status: DISCONTINUED
Start: 2021-11-04 | End: 2021-11-04 | Stop reason: HOSPADM

## 2021-11-04 RX ORDER — MIDAZOLAM HYDROCHLORIDE 2 MG/2ML
2 INJECTION, SOLUTION INTRAMUSCULAR; INTRAVENOUS ONCE
Status: COMPLETED | OUTPATIENT
Start: 2021-11-04 | End: 2021-11-04

## 2021-11-04 RX ORDER — DIPHENHYDRAMINE HYDROCHLORIDE 50 MG/ML
12.5 INJECTION INTRAMUSCULAR; INTRAVENOUS
Status: DISCONTINUED | OUTPATIENT
Start: 2021-11-04 | End: 2021-11-04 | Stop reason: HOSPADM

## 2021-11-04 RX ORDER — HYDROCODONE BITARTRATE AND ACETAMINOPHEN 5; 325 MG/1; MG/1
2 TABLET ORAL PRN
Status: COMPLETED | OUTPATIENT
Start: 2021-11-04 | End: 2021-11-04

## 2021-11-04 RX ORDER — HYDROCODONE BITARTRATE AND ACETAMINOPHEN 5; 325 MG/1; MG/1
1 TABLET ORAL EVERY 6 HOURS PRN
Qty: 28 TABLET | Refills: 0 | Status: SHIPPED | OUTPATIENT
Start: 2021-11-04 | End: 2021-11-11

## 2021-11-04 RX ORDER — SODIUM CHLORIDE 0.9 % (FLUSH) 0.9 %
10 SYRINGE (ML) INJECTION EVERY 12 HOURS SCHEDULED
Status: DISCONTINUED | OUTPATIENT
Start: 2021-11-04 | End: 2021-11-04 | Stop reason: HOSPADM

## 2021-11-04 RX ORDER — BUPIVACAINE HYDROCHLORIDE 2.5 MG/ML
INJECTION, SOLUTION INFILTRATION; PERINEURAL
Status: COMPLETED
Start: 2021-11-04 | End: 2021-11-04

## 2021-11-04 RX ORDER — FENTANYL CITRATE 50 UG/ML
100 INJECTION, SOLUTION INTRAMUSCULAR; INTRAVENOUS ONCE
Status: COMPLETED | OUTPATIENT
Start: 2021-11-04 | End: 2021-11-04

## 2021-11-04 RX ORDER — ONDANSETRON 2 MG/ML
INJECTION INTRAMUSCULAR; INTRAVENOUS PRN
Status: DISCONTINUED | OUTPATIENT
Start: 2021-11-04 | End: 2021-11-04 | Stop reason: SDUPTHER

## 2021-11-04 RX ORDER — MORPHINE SULFATE 1 MG/ML
1 INJECTION, SOLUTION EPIDURAL; INTRATHECAL; INTRAVENOUS EVERY 5 MIN PRN
Status: DISCONTINUED | OUTPATIENT
Start: 2021-11-04 | End: 2021-11-04 | Stop reason: HOSPADM

## 2021-11-04 RX ADMIN — FENTANYL CITRATE 50 MCG: 50 INJECTION, SOLUTION INTRAMUSCULAR; INTRAVENOUS at 12:49

## 2021-11-04 RX ADMIN — FENTANYL CITRATE 50 MCG: 50 INJECTION, SOLUTION INTRAMUSCULAR; INTRAVENOUS at 13:19

## 2021-11-04 RX ADMIN — LIDOCAINE HYDROCHLORIDE 80 MG: 10 INJECTION, SOLUTION EPIDURAL; INFILTRATION; INTRACAUDAL; PERINEURAL at 11:59

## 2021-11-04 RX ADMIN — Medication 0.2 MG: at 14:16

## 2021-11-04 RX ADMIN — HYDROCODONE BITARTRATE AND ACETAMINOPHEN 1 TABLET: 5; 325 TABLET ORAL at 15:38

## 2021-11-04 RX ADMIN — MIDAZOLAM HYDROCHLORIDE 2 MG: 2 INJECTION, SOLUTION INTRAMUSCULAR; INTRAVENOUS at 11:30

## 2021-11-04 RX ADMIN — ONDANSETRON 4 MG: 2 INJECTION INTRAMUSCULAR; INTRAVENOUS at 16:14

## 2021-11-04 RX ADMIN — ROCURONIUM BROMIDE 50 MG: 10 INJECTION INTRAVENOUS at 11:59

## 2021-11-04 RX ADMIN — SODIUM CHLORIDE, POTASSIUM CHLORIDE, SODIUM LACTATE AND CALCIUM CHLORIDE: 600; 310; 30; 20 INJECTION, SOLUTION INTRAVENOUS at 14:18

## 2021-11-04 RX ADMIN — MIDAZOLAM HYDROCHLORIDE 2 MG: 1 INJECTION, SOLUTION INTRAMUSCULAR; INTRAVENOUS at 11:30

## 2021-11-04 RX ADMIN — ONDANSETRON 4 MG: 2 INJECTION, SOLUTION INTRAMUSCULAR; INTRAVENOUS at 11:57

## 2021-11-04 RX ADMIN — DEXAMETHASONE SODIUM PHOSPHATE 10 MG: 10 INJECTION INTRAMUSCULAR; INTRAVENOUS at 12:10

## 2021-11-04 RX ADMIN — PROPOFOL INJECTABLE EMULSION 150 MG: 10 INJECTION, EMULSION INTRAVENOUS at 11:59

## 2021-11-04 RX ADMIN — Medication 1 MG: at 14:16

## 2021-11-04 RX ADMIN — SODIUM CHLORIDE, POTASSIUM CHLORIDE, SODIUM LACTATE AND CALCIUM CHLORIDE: 600; 310; 30; 20 INJECTION, SOLUTION INTRAVENOUS at 12:17

## 2021-11-04 RX ADMIN — FENTANYL CITRATE 100 MCG: 50 INJECTION, SOLUTION INTRAMUSCULAR; INTRAVENOUS at 11:30

## 2021-11-04 RX ADMIN — SODIUM CHLORIDE, POTASSIUM CHLORIDE, SODIUM LACTATE AND CALCIUM CHLORIDE: 600; 310; 30; 20 INJECTION, SOLUTION INTRAVENOUS at 10:35

## 2021-11-04 RX ADMIN — BUPIVACAINE 20 ML: 13.3 INJECTION, SUSPENSION, LIPOSOMAL INFILTRATION at 11:40

## 2021-11-04 RX ADMIN — BUPIVACAINE HYDROCHLORIDE 50 ML: 2.5 INJECTION, SOLUTION EPIDURAL; INFILTRATION; INTRACAUDAL; PERINEURAL at 11:40

## 2021-11-04 RX ADMIN — ONDANSETRON 4 MG: 2 INJECTION, SOLUTION INTRAMUSCULAR; INTRAVENOUS at 14:00

## 2021-11-04 RX ADMIN — CEFAZOLIN SODIUM 2000 MG: 2 SOLUTION INTRAVENOUS at 12:09

## 2021-11-04 ASSESSMENT — PAIN SCALES - GENERAL
PAINLEVEL_OUTOF10: 3
PAINLEVEL_OUTOF10: 4
PAINLEVEL_OUTOF10: 0

## 2021-11-04 ASSESSMENT — PULMONARY FUNCTION TESTS
PIF_VALUE: 16
PIF_VALUE: 14
PIF_VALUE: 15
PIF_VALUE: 16
PIF_VALUE: 16
PIF_VALUE: 14
PIF_VALUE: 15
PIF_VALUE: 16
PIF_VALUE: 17
PIF_VALUE: 15
PIF_VALUE: 14
PIF_VALUE: 16
PIF_VALUE: 14
PIF_VALUE: 2
PIF_VALUE: 13
PIF_VALUE: 14
PIF_VALUE: 15
PIF_VALUE: 15
PIF_VALUE: 14
PIF_VALUE: 14
PIF_VALUE: 15
PIF_VALUE: 14
PIF_VALUE: 14
PIF_VALUE: 15
PIF_VALUE: 14
PIF_VALUE: 14
PIF_VALUE: 16
PIF_VALUE: 15
PIF_VALUE: 0
PIF_VALUE: 14
PIF_VALUE: 16
PIF_VALUE: 9
PIF_VALUE: 16
PIF_VALUE: 15
PIF_VALUE: 16
PIF_VALUE: 15
PIF_VALUE: 14
PIF_VALUE: 16
PIF_VALUE: 14
PIF_VALUE: 15
PIF_VALUE: 14
PIF_VALUE: 15
PIF_VALUE: 15
PIF_VALUE: 14
PIF_VALUE: 15
PIF_VALUE: 18
PIF_VALUE: 14
PIF_VALUE: 15
PIF_VALUE: 14
PIF_VALUE: 14
PIF_VALUE: 15
PIF_VALUE: 14
PIF_VALUE: 1
PIF_VALUE: 14
PIF_VALUE: 15
PIF_VALUE: 0
PIF_VALUE: 15
PIF_VALUE: 14
PIF_VALUE: 16
PIF_VALUE: 15
PIF_VALUE: 18
PIF_VALUE: 16
PIF_VALUE: 15
PIF_VALUE: 14
PIF_VALUE: 14
PIF_VALUE: 15
PIF_VALUE: 1
PIF_VALUE: 14
PIF_VALUE: 14
PIF_VALUE: 16
PIF_VALUE: 16
PIF_VALUE: 14
PIF_VALUE: 14
PIF_VALUE: 1
PIF_VALUE: 15
PIF_VALUE: 16
PIF_VALUE: 16
PIF_VALUE: 15
PIF_VALUE: 14
PIF_VALUE: 15
PIF_VALUE: 16
PIF_VALUE: 8
PIF_VALUE: 15
PIF_VALUE: 18
PIF_VALUE: 15
PIF_VALUE: 16
PIF_VALUE: 18
PIF_VALUE: 16
PIF_VALUE: 13
PIF_VALUE: 19
PIF_VALUE: 15
PIF_VALUE: 16
PIF_VALUE: 16
PIF_VALUE: 14
PIF_VALUE: 15
PIF_VALUE: 15
PIF_VALUE: 14
PIF_VALUE: 15
PIF_VALUE: 15
PIF_VALUE: 16
PIF_VALUE: 14
PIF_VALUE: 15
PIF_VALUE: 1
PIF_VALUE: 15
PIF_VALUE: 16
PIF_VALUE: 15
PIF_VALUE: 16
PIF_VALUE: 15
PIF_VALUE: 14
PIF_VALUE: 15
PIF_VALUE: 14
PIF_VALUE: 15
PIF_VALUE: 13
PIF_VALUE: 14
PIF_VALUE: 14
PIF_VALUE: 15
PIF_VALUE: 3
PIF_VALUE: 15
PIF_VALUE: 14
PIF_VALUE: 15
PIF_VALUE: 14
PIF_VALUE: 14
PIF_VALUE: 15
PIF_VALUE: 1
PIF_VALUE: 16
PIF_VALUE: 15
PIF_VALUE: 14
PIF_VALUE: 15
PIF_VALUE: 14
PIF_VALUE: 15
PIF_VALUE: 16
PIF_VALUE: 16
PIF_VALUE: 15
PIF_VALUE: 17
PIF_VALUE: 15
PIF_VALUE: 16
PIF_VALUE: 16
PIF_VALUE: 15
PIF_VALUE: 15

## 2021-11-04 ASSESSMENT — PAIN - FUNCTIONAL ASSESSMENT: PAIN_FUNCTIONAL_ASSESSMENT: 0-10

## 2021-11-04 ASSESSMENT — PAIN DESCRIPTION - PAIN TYPE: TYPE: SURGICAL PAIN

## 2021-11-04 ASSESSMENT — PAIN DESCRIPTION - DESCRIPTORS: DESCRIPTORS: BURNING;CONSTANT;DISCOMFORT

## 2021-11-04 ASSESSMENT — PAIN DESCRIPTION - ORIENTATION: ORIENTATION: LOWER

## 2021-11-04 ASSESSMENT — PAIN DESCRIPTION - LOCATION: LOCATION: ABDOMEN

## 2021-11-04 NOTE — PROGRESS NOTES
CLINICAL PHARMACY NOTE: MEDS TO BEDS    Total # of Prescriptions Filled: 4   The following medications were delivered to the patient:  · Gabapentin 300mg  · Cephalexin 500mg  · Norco 5-325  · Baclofen 10mg    Additional Documentation:

## 2021-11-04 NOTE — ANESTHESIA POSTPROCEDURE EVALUATION
POST- ANESTHESIA EVALUATION       Pt Name: José Antonio Ames  MRN: [id number]  YOB: 1970  Date of evaluation: 11/4/2021  Time:  3:54 PM      /72   Pulse 77   Temp 96.8 °F (36 °C) (Infrared)   Resp 22   Wt 175 lb (79.4 kg)   SpO2 97%   BMI 28.25 kg/m²      Consciousness Level  Awake  Cardiopulmonary Status  Stable  Pain Adequately Treated YES  Nausea / Vomiting  NO  Adequate Hydration  YES  Anesthesia Related Complications NONE      Electronically signed by Chris Villanueva MD on 11/4/2021 at 3:54 PM       Department of Anesthesiology  Postprocedure Note    Patient: José Antonio Amse  MRN: [id number]  YOB: 1970  Date of evaluation: 11/4/2021  Time:  3:54 PM     Procedure Summary     Date: 11/04/21 Room / Location: [address] [place] [address] 02 / [address] [place] [address]    Anesthesia Start: 3462 Anesthesia Stop: 5185    Procedures:       PANNICULECTOMY (N/A Abdomen)      ABDOMINOPLASTY WITH BIOPATCH AROUND PANTERA DRAIN AND INCISIONAL WOUND VAC WITH PRE OP TAP BLOCK AND 1ST ASSIST (N/A Abdomen) Diagnosis:       (E65  SYMPTOMATIC PANNICULUS)      (M62.00 RECTUS DIASTASIS)    Surgeons: Janice Franks MD Responsible Provider: Chris Villanueva MD    Anesthesia Type: general ASA Status: 2          Anesthesia Type: general    Janki Phase I: Janki Score: 8    Janki Phase II:      Last vitals: Reviewed and per EMR flowsheets.        Anesthesia Post Evaluation

## 2021-11-04 NOTE — ANESTHESIA PROCEDURE NOTES
Peripheral Block    Patient location during procedure: pre-op  Start time: 11/4/2021 11:38 AM  End time: 11/4/2021 11:43 AM  Staffing  Performed: anesthesiologist   Anesthesiologist: Steffen Barbour MD  Preanesthetic Checklist  Completed: patient identified, IV checked, site marked, risks and benefits discussed, surgical consent, monitors and equipment checked, pre-op evaluation, timeout performed, anesthesia consent given, oxygen available and patient being monitored  Peripheral Block  Patient position: supine  Prep: ChloraPrep  Patient monitoring: cardiac monitor, continuous pulse ox, frequent blood pressure checks and IV access  Block type: TAP  Laterality: bilateral  Injection technique: single-shot  Guidance: ultrasound guided  Local infiltration: bupivacaine  Infiltration strength: 0.25 %  Dose: 4 mL  Provider prep: mask and sterile gloves  Expiration date: 5/31/2026  Kit: [id number]  Local infiltration: bupivacaine  Needle  Needle type: combined needle/nerve stimulator   Needle gauge: 20 G  Needle length: 10 cm  Needle localization: anatomical landmarks and ultrasound guidance  Needle insertion depth: 6 cm  Test dose: negative  Lot number: [id number]  Assessment  Injection assessment: negative aspiration for heme, no paresthesia on injection and local visualized surrounding nerve on ultrasound  Paresthesia pain: none  Slow fractionated injection: yes  Hemodynamics: stable  Medications Administered  Bupivacaine (MARCAINE) PF injection 0.25%, 50 mL  bupivacaine liposome (EXPAREL) injection 1.3%, 20 mL  Reason for block: post-op pain management and at surgeon's request

## 2021-11-04 NOTE — ANESTHESIA PRE PROCEDURE
Department of Anesthesiology  Preprocedure Note       Name:  José Antonio Ames   Age:  54 y.o.  :  1970                                          MRN:  [id number]         Date:  2021      Surgeon: Matt Oliver):  Janice Franks MD    Procedure: Procedure(s):  PANNICULECTOMY  ABDOMINOPLASTY WITH BIOPATCH AROUND PANTERA DRAIN AND INCISIONAL WOUND VAC WITH PRE OP TAP BLOCK AND 1ST ASSIST    Medications prior to admission:   Prior to Admission medications    Medication Sig Start Date End Date Taking? Authorizing Provider   ondansetron (ZOFRAN-ODT) 4 MG disintegrating tablet Place 1 tablet under the tongue every 8 hours as needed for Nausea or Vomiting 21  Yes Lexy Parra MD   nystatin (MYCOSTATIN) 748688 UNIT/GM powder Apply 3 times daily.  21  Yes Lexy Parra MD   pantoprazole (PROTONIX) 40 MG tablet Take 1 tablet by mouth every morning (before breakfast) 21   Nakul Gill MD   DULoxetine (CYMBALTA) 20 MG extended release capsule Take 1 capsule by mouth daily 21   Lexy Parra MD   Calcium-Vitamin D-Vitamin K (CALCIUM SOFT CHEWS PO) Take by mouth 3 times daily    Historical Provider, MD   Multiple Vitamins-Minerals (THERAPEUTIC MULTIVITAMIN-MINERALS) tablet Take 1 tablet by mouth daily    Historical Provider, MD   senna-docusate (Eveleen Cake) 8.6-50 MG per tablet Take 2 tablets by mouth daily 2/10/21   Nakul Gill MD       Current medications:    Current Facility-Administered Medications   Medication Dose Route Frequency Provider Last Rate Last Admin    lactated ringers infusion   IntraVENous Continuous Susan Lino MD        sodium chloride flush 0.9 % injection 10 mL  10 mL IntraVENous 2 times per day Susan Lino MD        sodium chloride flush 0.9 % injection 10 mL  10 mL IntraVENous PRN Susan Lino MD        0.9 % sodium chloride infusion  25 mL IntraVENous PRN Susan Lino MD        lidocaine PF 1 % injection 1 mL  1 mL IntraDERmal Once PRN Javed Perez MD        bupivacaine (MARCAINE) 0.25 % injection             fentaNYL (SUBLIMAZE) 100 MCG/2ML injection             midazolam (VERSED) 2 MG/2ML injection             ceFAZolin (ANCEF) IVPB             bupivacaine liposome (EXPAREL) 1.3 % injection             sodium chloride (PF) 0.9 % injection                Allergies: Allergies   Allergen Reactions    Nsaids     Hydrocodone-Acetaminophen Nausea Only    Oxycodone-Acetaminophen Nausea Only    Sulfamethoxazole-Trimethoprim Hives and Rash       Problem List:    Patient Active Problem List   Diagnosis Code    Status post gastric bypass for obesity Z98.84    Anxiety F41.9    Nausea R11.0    Intertrigo L30.4       Past Medical History:        Diagnosis Date    Abnormal findings on esophagogastroduodenoscopy (EGD) 2020    Gastritis, tiny hiatal hernia, candy cane deformity of gastrojejunostomy    Arthritis     Chronic constipation     Gastritis     GERD (gastroesophageal reflux disease)     H. pylori infection     Headache(784.0)     Hiatal hernia     Tiny per EGD 6-12-20    Prolonged emergence from general anesthesia     Sleep apnea     Pt states has resolved since gastric bypass surgery. Past Surgical History:        Procedure Laterality Date    CHOLECYSTECTOMY  1991    COLONOSCOPY      COLONOSCOPY N/A 3/10/2021    COLONOSCOPY DIAGNOSTIC performed by Hector Arora MD at [address], [place], DIAGNOSTIC  2020    GASTRIC BYPASS SURGERY  2016    [facility]    HYSTERECTOMY      LAPAROSCOPY      LEEP      NASAL SINUS SURGERY      TUBAL LIGATION         Social History:    Social History     Tobacco Use    Smoking status: Former Smoker     Quit date:      Years since quittin.8    Smokeless tobacco: Never Used   Substance Use Topics    Alcohol use: Not Currently                                Counseling given: Not Answered      Vital Signs (Current):  There were no vitals filed for this visit. BP Readings from Last 3 Encounters:   06/16/21 100/68   04/13/21 (!) 150/92   03/10/21 102/61       NPO Status: Time of last liquid consumption: 2300                        Time of last solid consumption: 2300                        Date of last liquid consumption: 11/03/21                        Date of last solid food consumption: 11/03/21    BMI:   Wt Readings from Last 3 Encounters:   10/20/21 172 lb (78 kg)   08/25/21 174 lb (78.9 kg)   06/16/21 174 lb (78.9 kg)     There is no height or weight on file to calculate BMI.    CBC:   Lab Results   Component Value Date    WBC 5.9 10/26/2021    RBC 4.93 10/26/2021    HGB 13.9 10/26/2021    HCT 42.3 10/26/2021    MCV 85.7 10/26/2021    RDW 13.3 10/26/2021     10/26/2021       CMP:   Lab Results   Component Value Date     10/26/2021    K 3.8 10/26/2021     10/26/2021    CO2 28 10/26/2021    BUN 9 10/26/2021    CREATININE 0.75 10/26/2021    GFRAA >60 10/26/2021    LABGLOM >60 10/26/2021    GLUCOSE 93 10/26/2021    PROT 7.3 10/26/2021    CALCIUM 9.2 10/26/2021    BILITOT 0.50 10/26/2021    ALKPHOS 78 10/26/2021    AST 17 10/26/2021    ALT 12 10/26/2021       POC Tests: No results for input(s): POCGLU, POCNA, POCK, POCCL, POCBUN, POCHEMO, POCHCT in the last 72 hours.     Coags: No results found for: PROTIME, INR, APTT    HCG (If Applicable):   Lab Results   Component Value Date    HCG NEGATIVE 05/19/2015        ABGs: No results found for: PHART, PO2ART, ATB3QBN, ANK8MAQ, BEART, R9VGASXB     Type & Screen (If Applicable):  No results found for: LABABO, LABRH    Drug/Infectious Status (If Applicable):  No results found for: HIV, HEPCAB    COVID-19 Screening (If Applicable):   Lab Results   Component Value Date    COVID19 Not Detected 10/31/2021           Anesthesia Evaluation  Patient summary reviewed and Nursing notes reviewed no history of anesthetic complications:   Airway: Mallampati: II  TM distance: >3 FB   Neck ROM: full  Mouth opening: > = 3 FB Dental: normal exam         Pulmonary:normal exam  breath sounds clear to auscultation  (+) sleep apnea:                             Cardiovascular:Negative CV ROS  Exercise tolerance: no interval change,           Rhythm: regular  Rate: normal                    Neuro/Psych:   (+) headaches:, depression/anxiety             GI/Hepatic/Renal:   (+) hiatal hernia, GERD: no interval change,           Endo/Other:    (+) : arthritis: OA and no interval change. , . Abdominal:       Abdomen: soft. Vascular: negative vascular ROS. Other Findings:           Anesthesia Plan      general     ASA 2     (GA and TAP block)  Induction: intravenous. MIPS: Postoperative opioids intended and Prophylactic antiemetics administered. Anesthetic plan and risks discussed with patient. Plan discussed with CRNA.                   Vera Guerrero MD   11/4/2021

## 2021-11-04 NOTE — H&P
[facility] [place] [facility]"" [facility]  Health system [id number]       History and Physical     No chief complaint on file. HPI:   Saurabh Howe is a 54 y.o. female who presents status post bariatric surgery. Patient has lost over 100 pounds. Patient has maintained her weight. Her excess skin in her abdominal region affects her quality of life and her daily activities. The pannus has an odor to it. Interferes with additional exercises. It is painful and pulls on her abdominal skin. It prevents her close from fitting well. It pulls on her pubic hair. Patient continually gets rashes under the area. Patient has been treating it with multiple modalities including neuro sporting, nystatin powder and ointments with constant recurrence. Patient's initial weight was 250 pounds. Patient continuously also has back pain. Patient is here to discuss the excess skin in her abdominal region.     Medications:     Current Facility-Administered Medications   Medication Dose Route Frequency Provider Last Rate Last Admin    lactated ringers infusion   IntraVENous Continuous Destinee Mojica  mL/hr at 11/04/21 1035 New Bag at 11/04/21 1035    sodium chloride flush 0.9 % injection 10 mL  10 mL IntraVENous 2 times per day Destinee Mojica MD        sodium chloride flush 0.9 % injection 10 mL  10 mL IntraVENous PRN Destinee Mojica MD        0.9 % sodium chloride infusion  25 mL IntraVENous PRN Destinee Mojica MD        lidocaine PF 1 % injection 1 mL  1 mL IntraDERmal Once PRN Destinee Mojica MD        bupivacaine (MARCAINE) 0.25 % injection             fentaNYL (SUBLIMAZE) 100 MCG/2ML injection             midazolam (VERSED) 2 MG/2ML injection             ceFAZolin (ANCEF) IVPB             bupivacaine liposome (EXPAREL) 1.3 % injection             sodium chloride (PF) 0.9 % injection             meperidine (DEMEROL) injection 12.5 mg  12.5 mg IntraVENous Q5 Min PRN Alessandro Cobian MD        morphine (PF) injection 1 mg  1 mg IntraVENous Q5 Min PRN Alessandro Cobian MD        HYDROmorphone (DILAUDID) injection 0.5 mg  0.5 mg IntraVENous Q5 Min PRN Alessandro Cobian MD        fentaNYL (SUBLIMAZE) injection 25 mcg  25 mcg IntraVENous Q5 Min PRN Alessandro Cobian MD        HYDROcodone-acetaminophen [facility]) 5-325 MG per tablet 1 tablet  1 tablet Oral PRN Alessandro Cobian MD        Or   [place] HYDROcodone-acetaminophen (NORCO) 5-325 MG per tablet 2 tablet  2 tablet Oral PRN Alessandro Cobian MD        ondansetron [facility]) injection 4 mg  4 mg IntraVENous Once PRN Alessandro Cobian MD        promethazine (PHENERGAN) injection 6.25 mg  6.25 mg IntraMUSCular Once PRN Alessandro Cobian MD        diphenhydrAMINE (BENADRYL) injection 12.5 mg  12.5 mg IntraVENous Once PRN Alessandro Cobian MD        hydrALAZINE (APRESOLINE) injection 5 mg  5 mg IntraVENous Q10 Min PRN Alessandro Cobian MD        midazolam PF (VERSED) injection 2 mg  2 mg IntraVENous Once Alessandro Cobian MD        fentaNYL (SUBLIMAZE) injection 100 mcg  100 mcg IntraVENous Once Alessandro Cobian MD          Allergies: Allergies   Allergen Reactions    Nsaids     Hydrocodone-Acetaminophen Nausea Only    Oxycodone-Acetaminophen Nausea Only    Sulfamethoxazole-Trimethoprim Hives and Rash     Review of Systems:   Constitutional: Negative for fever, chills, fatigue and unexpected weight change. HENT: Patient has history of headaches. Eyes: Negative for pain and discharge. Respiratory: Patient also has a history of sleep apnea. .    Cardiovascular: Negative for chest pain. Gastrointestinal: Patient has a history of GERD. Patient also has history of gastritis. Patient also has chronic constipation. Patient also has hiatal hernia. Skin: Negative for pallor and rash. Neurological: Negative for seizures, syncope and numbness. Hematological: Does not bruise/bleed easily. Psychiatric/Behavioral: Negative for behavioral problems. The patient is not nervous/anxious.     Musculoskeletal: Patient has a history of arthritis. Past Medical History:   Diagnosis Date    Abnormal findings on esophagogastroduodenoscopy (EGD) 2020    Gastritis, tiny hiatal hernia, candy cane deformity of gastrojejunostomy    Arthritis     Chronic constipation     Gastritis     GERD (gastroesophageal reflux disease)     H. pylori infection     Headache(784.0)     Hiatal hernia     Tiny per EGD 20    Prolonged emergence from general anesthesia     Sleep apnea     Pt states has resolved since gastric bypass surgery. Past Surgical History:   Procedure Laterality Date    CHOLECYSTECTOMY  1991    COLONOSCOPY      COLONOSCOPY N/A 3/10/2021    COLONOSCOPY DIAGNOSTIC performed by Krystal Loja MD at [address], DIAGNOSTIC  2020    GASTRIC BYPASS SURGERY  2016    [facility]    HYSTERECTOMY      LAPAROSCOPY      LEEP      NASAL SINUS SURGERY      TUBAL LIGATION       Social History     Socioeconomic History    Marital status:      Spouse name: Not on file    Number of children: Not on file    Years of education: Not on file    Highest education level: Not on file   Occupational History    Not on file   Tobacco Use    Smoking status: Former Smoker     Quit date:      Years since quittin.8    Smokeless tobacco: Never Used   Vaping Use    Vaping Use: Former   Substance and Sexual Activity    Alcohol use: Not Currently    Drug use: No    Sexual activity: Not on file   Other Topics Concern    Not on file   Social History Narrative    Not on file     Social Determinants of Health     Financial Resource Strain:     Difficulty of Paying Living Expenses:    Food Insecurity:     Worried About [address] in the Last Year:     [address] in the Last Year:    Transportation Needs:     Lack of Transportation (Medical):      Lack of Transportation (Non-Medical):    Physical Activity:     Days of Exercise per Week:     Minutes of Exercise per Session:    Stress:     Feeling of Stress :    Social Connections:     Frequency of Communication with Friends and Family:     Frequency of Social Gatherings with Friends and Family:     Attends Hindu Services:     Active Member of Clubs or Organizations:     Attends Club or Organization Meetings:     Marital Status:    Intimate Partner Violence:     Fear of Current or Ex-Partner:     Emotionally Abused:     Physically Abused:     Sexually Abused:      Family History   Problem Relation Age of Onset    Cancer Mother 64        ovarian cancer     No Known Problems Father      Physical Exam:   /68   Temp 96.3 °F (35.7 °C) (Temporal)   Resp 12   Wt 175 lb (79.4 kg)   SpO2 96%   BMI 28.25 kg/m²    Body mass index is 28.25 kg/m². Physical Exam   Nursing note and vitals reviewed. Constitutional: Oriented to person, place, and time. Appears well-developed and well-nourished. No distress. Head: Normocephalic and atraumatic. Eyes: Conjunctivae and EOM are normal.   Pulmonary/Chest: Effort normal. No respiratory distress. Neurological: Alert and oriented to person, place, and time. Skin:   Patient has a history of rashes. Patient also has multiple striae. Patient also has excess skin in both horizontal and vertical direction. Abdomen: Soft nontender nondistended. Rectus diastases present. Psychiatric: Normal mood and affect. Behavior is normal    PANNICULECTOMY HEALTH CHECKLIST:  Height:     Weight:      BMI: There is no height or weight on file to calculate BMI. Does your pannus affect your daily activities and quality of life? Yes  x    No    How long: Which daily living activities are affected in the following ways? Cleaning of feet    Odor x   Interferes with exercise x   [place]   Painful pulling of abdominal skin    Clothes not fitting  x          Urination   Pulling of pubic hair    Seat belt not fitting    Other:      Back pain? Yes x     No        Any Treatment? Yes      No x        Any Testing? Yes      No x  Where/What:     Have you had bariatric surgery? Yes  x    No 8/25/2016        Initial weight: 250        Weight stable for how long? Since 2017   Do you have a hernia? Yes  x    No (see notes)        Testing:  CT      Date/location:    Have you had a hernia repair in the past?  Yes      No x        Was mesh used? Yes      No x        Surgeon for hernia:    Any Rashes/Ulcers under folds of skin? Yes  x    No         Medications used:                                                        OTC Neosporin                                                                                                RX Nystatin powder, ointment         Notes: patient has a hiatal hernia 6/12/2020  Slight hernia seen on colonoscopy 3/10/21    Imaging:                               Impression/Plan:      Diagnosis Orders   1. Diastasis of rectus abdominis     2. Symptomatic abdominal panniculus         Patient Active Problem List   Diagnosis    Status post gastric bypass for obesity    Anxiety    Nausea    Intertrigo       Plan:  I discussed the differences between a panniculectomy and abdominoplasty to the patient. The risk of both procedures were discussed with patient in detail. All questions were answered. I also discussed that with a panniculectomy and abdominoplasty we generally only can address the horizontal excess skin and not the vertical.  Also discussed that the AP dimension which is intra-abdominal will not change with either abdominoplasty or panniculectomy. The following risks were discussed with the patient. A written copy was provided to the patient for informational purposes. The following information was discussed with the Patient, but this is not an all-inclusive-other issues were also discussed with patient. I also discussed the following with the patient. I discussed loss of umbilicus. I discussed scars. I discussed dogears.   I discussed wound healing. I discussed skin necrosis. I also discussed that status post bariatric surgery that the skin has stretched beyond its limited and there is still there to be elasticity and deformity. We discussed fat necrosis. We discussed postoperative seromas. We discussed postoperative bleeding. Also discussed malposition of the umbilicus. We also discussed asymmetries of the scar. Numbness on abdominal wall and lateral thighs. GENERAL INFORMATION  Panniculectomy is a surgical procedure to remove excess skin and fatty tissue from the lower abdomen wall. Panniculectomy does not treat muscle laxity of the upper abdomen. Obese individuals who intend to lose weight should postpone all forms of body contouring surgery until they have reached a stable weight. There are a variety of different techniques used by plastic surgeons for panniculectomy. Panniculectomy can be combined with other forms of body-contouring surgery, including suction-assisted lipectomy, or performed at the same time with other elective surgeries. ALTERNATIVE TREATMENTS  Alternative forms of management consist of not treating the areas of loose skin and fatty deposits. Liposuction may be a surgical alternative to if there is good skin tone and localized abdominal fatty. If you have lost your skin laxity as apparent by multiple stretch marks you will not be a good liposuction candidate. Diet and exercise programs may be of benefit in the overall reduction of excess body fat and contour improvement. Risks and potential complications are associated with alternative surgical forms of treatment. RISKS OF PANNICULECTOMY SURGERY  Every surgical procedure involves a certain amount of risk and it is important that you understand these risks and the possible complications associated with them. In addition, every procedure has limitations.   An individual's choice to undergo a surgical procedure is based on the comparison of the risk to potential benefit. Although the majority of patients do not experience these complications, you should discuss each of them with your plastic surgeon to make sure you completely understand all possible consequences of a abdominoplasty/panniculectomy. Bleeding- It is possible, though unusual, to experience a bleeding episode during or after surgery. There is blood in the pannus, and depending the size of your pannus you may be subjected to considerable blood loss. Should post-operative bleeding occur, it may require an emergency treatment to drain the accumulated blood or blood transfusion. Intra-operative blood transfusions may be required. Do not take any aspirin or anti-inflammatory medications for ten days before surgery, as this may increase the risk of bleeding. Non-prescription herbs and dietary supplements can increase the risk of surgical bleeding. Hematoma can occur at any time following injury. If blood transfusions are needed to treat blood loss, there is a risk of blood related infections such as hepatitis and the HIV (AIDS). Heparin medications that are used to prevent blood clots in veins can produce bleeding and decreased blood platelets. Infection - Infection is can occur after this surgery. Should an infection occur, treatment including antibiotics, hospitalization, or additional surgery may be necessary. There is a greater risk of infection when body contouring procedures are performed in conjunction with abdominal surgical procedures. Change in Skin Sensation- It is common to experience diminished (or loss) of skin sensation in areas that have had surgery. Diminished (or complete loss of skin sensation) may not totally resolve after an abdominoplasty/pannicular     Skin Contour Irregularities- Contour and shape irregularities and depressions may occur after abdominoplasty. Visible and palpable wrinkling of skin can occur.   Residual skin irregularities at the ends of the incisions or dog ears are always a possibility as is skin pleating when there is excessive redundant skin. This may improve with time, or it can be surgically corrected. Major Wound Separation- Wounds may separate after surgery. Should this occur, additional treatment including surgery may be necessary. Skin Discoloration / Swelling- Bruising and swelling normally occurs following panniculectomy. The skin in or near the surgical site can appear either lighter or darker than surrounding skin. Although uncommon, swelling and skin discoloration may persist for long periods of time and, in rare situations, may be permanent. Skin Sensitivity- Itching, tenderness, or exaggerated responses to hot or cold temperatures may occur after surgery. Usually this resolve during healing, but in some situations it may be chronic. Sutures- Most surgical techniques use deep sutures. You may notice these sutures after your surgery. Sutures may spontaneously poke through the skin, become visible or produce irritation that requires removal.    Damage to Deeper Structures- There is the potential for injury to deeper structures including nerves, blood vessels, muscles, and lungs (pneumothorax), or intra-abdominal injury can occure during any surgical procedure. The potential for this to occur varies according to the type of procedure being performed. Injury to deeper structures may be temporary or permanent. Fat Necrosis- Fatty tissue found deep in the skin might die. This may produce areas of firmness within the skin. Additional surgery to remove areas of fat necrosis may be necessary. There is the possibility of contour irregularities in the skin that may result from fat necrosis. Obesity: If you're BMI is greater than 30 you may have a higher chance of complications. This may include but not limited to wound healing and infections.   Also if you have other medical problems such as diabetes and during surgery and prescription medications. Allergic reactions may require additional treatment. Delayed Healing- Wound disruption or delayed wound healing is possible. Some areas of the abdomen may not heal normally and may take a long time to heal.  Some areas of skin or tissue may die. This may require frequent dressing changes or further surgery to remove the non-healed tissue. Smokers have a greater risk of skin loss and wound healing complications. Also patient with certain systemic disease or taking certain medications are at increased risk. Also infections under the pannus may effect your healing. Seroma- Fluid accumulations infrequently occur in between the skin and the abdominal wall. This may require additional procedures for drainage of fluid. Shock- In rare circumstances, your surgical procedure can cause severe trauma, particularly when multiple or extensive procedures are performed. Although serious complications are infrequent, infections or excessive fluid loss can lead to severe illness and even death. If surgical shock occurs, hospitalization and additional treatment would be necessary. Surgical Wetting Solutions-There is the possibility that large volumes of fluid containing dilute local anesthetic drugs and epinephrine that is injected into fatty deposits during surgery may contribute to fluid overload or systemic reaction to these medications. Additional treatment including hospitalization may be necessary. Persistent Swelling (Lymphedema)- Persistent swelling in the legs can occur following panniculectomy. Pain- You will experience pain after your surgery. Pain of varying intensity and duration may occur and persist after panniculectomy. Chronic pain may occur very  from nerves becoming trapped in scar tissue after panniculectomy.   There may be numbness that can occur after a panniculectomy this could be temporary or permanent    Unsatisfactory Result- There is no guarantee or warranty expressed or implied, on the results that may be obtained. You may be disappointed with the results of panniculectomy surgery. This would include risks such as asymmetry, unsatisfactory or highly visible surgical scar location, unacceptable visible deformities, bunching and rippling in the skin near the suture lines or at the ends of the incisions (dog ears), poor healing, wound disruption, and loss of sensation. It may not be possible to correct or improve the effects of surgical scars. In some situations, it may not be possible to achieve optimal results with a single surgical procedure. Additional surgery may be required to improve results. Deep Venous Thrombosis, Cardiac and Pulmonary Complications- Surgery, especially longer procedures, may be associated with the formation of, or increase in, blood clots in the venous system. Pulmonary complications may occur secondarily to both blood clots (pulmonary emboli), fat deposits (fat emboli) or partial collapse of the lungs after general anesthesia. Pulmonary and fat emboli can be life-threatening or fatal in some circumstances. Air travel, inactivity and other conditions may increase the incidence of blood clots traveling to the lungs causing a major blood clot that may result in death. It is important to discuss with your physician any past history of blood clots, swollen legs or the use of estrogen or birth control pills that may contribute to this condition. Cardiac complications are a risk with any surgery and anesthesia, even in patients without symptoms. Should any of these complications occur, you may require hospitalization and additional treatment. If you experience shortness of breath, chest pains, or unusual heart beats, seek medical attention immediately.       ADDITIONAL ADVISORIES    Long-Term Results- Subsequent alterations in the appearance of your body may occur as the result of aging, sun exposure, weight loss, weight gain, pregnancy, menopause or other circumstances not related to your surgery. Metabolic Status of Massive Weight Loss Patients- Your personal metabolic status of blood chemistry and protein levels may be abnormal following massive weight loss and surgical procedures to make a patient loose weight. Individuals with abnormalities may be a risk for serious medical and surgical complications, including delayed wound healing, infection or even in rare cases, death. Body-Piercing Procedures- Individuals who currently wear body-piercing jewelry or are seeking to undergo body-piercing procedures must consider the possibility that an infection could develop anytime following this procedure. Treatment including antibiotics, hospitalization or additional surgery may be necessary. Intimate Relations After Surgery- Surgery involves coagulating of blood vessels and increased activity of any kind may open these vessels leading to a bleed, or hematoma. Increased activity that increased your pulse or heart rate may cause additional bruising, swelling, and the need for return to surgery and control bleeding. It is wise to refrain from sexual activity until your physician states it is safe. Medications-  There are many adverse reactions that occur as the result of taking over-the-counter, herbal, and/or prescription medications. Be sure to check with your physician about any drug interactions that may exist with medications that you are already taking. If you have an adverse reaction, stop the drugs immediately and call your plastic surgeon for further instructions. If the reaction is severe, go immediately to the nearest emergency room. When taking the prescribed pain medications after surgery, realize that they can affect your thought process and coordination.   Do not drive, do not operate complex equipment, do not make any important decisions and do not drink any alcohol while taking these even more uncommon. The practice of medicine and surgery is not an exact science. There is no guarantee or warranty expressed or implied, on the results that may be obtained. In some situations, it may not be possible to achieve optimal results with a single surgical procedure or even a multiple procedure. PATIENT COMPLIANCE   Follow all physician instructions carefully; this is essential for the success of your outcome. It is important that the surgical incisions are not subjected to excessive force, swelling, abrasion, or motion during the time of healing. Personal and vocational activity needs to be restricted. Protective dressings and drains should not be removed unless instructed by me. Successful post-operative function depends on both surgery and subsequent care. Physical activity that increases your pulse or heart rate may cause bruising, swelling, fluid accumulation and the need for return to surgery. It is wise to refrain from intimate physical activities after surgery until your physician states it is safe. It is important that you participate in follow-up care, return for aftercare, and promote your recovery after surgery. FINANCIAL RESPONSIBILITIES  The cost of surgery involves several charges for the services provided. The total includes fees charged by your surgeon, the cost of surgical supplies, anesthesia, laboratory tests, and possible hospital charges, depending on where the surgery is performed. Depending on whether the cost of surgery is covered by an insurance plan, you will be responsible for necessary co-payments, deductibles, and charges not covered. The fees charged for this procedure do not include any potential future costs for additional procedures that you elect to have or require in order to revise, optimize, or complete your outcome. Additional costs may occur should complications develop from the surgery.   Secondary surgery or hospital day-surgery charges involved with revision surgery will also be your responsibility. HEALTH INSURANCE  Most health insurance companies exclude coverage for cosmetic surgical operations any complications that might occur from surgery. Please carefully review your health insurance subscriber-information pamphlet or contact your insurance company for a detailed explanation of their policies for covering abdominoplasty/panniculectomy procedures. Most insurance plans may exclude coverage for secondary or revisionary surgery. ASPS consent abdominoplasty    GENERAL INFORMATION  Abdominoplasty is a surgical procedure to remove excess skin and fatty tissue from the middle and lower abdomen and to tighten muscles of the abdominal wall. Abdominoplasty is not a surgical treatment for being overweight. Weight changes will affect your body contouring results. You should not have body contouring until you have reached a stable weight. ALTERNATIVE TREATMENTS  Alternative forms of management consist of not treating the areas of loose skin and fatty deposits. Liposuction may be a surgical alternative to abdominoplasty if there is good skin tone and localized abdominal fatty deposits in an individual of normal weight. Diet and exercise programs may be of benefit in the overall reduction of excess body fat and contour improvement. Risks and potential complications are also associated with alternative surgical forms of treatment. INHERENT RISKS OF ABDOMINOPLASTY SURGERY  Every surgical procedure involves a certain amount of risk and it is important that you understand these risks and the possible complications associated with them. In addition, every procedure has limitations. An individual's choice to undergo a surgical procedure is based on the comparison of the risk to potential benefit. SPECIFIC RISKS OF ABDOMINOPLASTY SURGERY  Change in Skin Sensation:   It is common to experience diminished (or loss) of skin sensation in areas that have had hospitalizations. Patients with diabetes or those taking medications such as steroids on an extended basis may have prolonged healing issues. Smoking will cause a delay in the healing process, often resulting in the need for additional surgery. There are general risks associated with healing such as swelling, bleeding, possibility of additional surgery, prolonged recovery, color changes, shape changes, infection, not meeting your goals and expectations, and added expense to the patient. There may also be a longer recovery due to the length of surgery and anesthesia. Patients with significant skin laxity (patients seeking facelifts, breast lifts, abdominoplasty, and body lifts) will continue to have the same lax  skin after surgery. The quality or elasticity of skin will not change, and recurrence of skin looseness will occur at some time in the future, quicker for some than others. There are nerve endings that may become involved with healing scars from surgery such as suction-assisted lipectomy, abdominoplasty, facelifts, body lifts, and extremity surgery. While there may not be a major nerve injury, the small nerve endings during the healing period may become too active producing a painful or oversensitive area due to the small sensory nerve involved with scar tissue. Often, massage and early non-surgical intervention resolves this. It is important to discuss post-surgical pain with your surgeon. Bleeding: It is possible, to experience a bleeding episode during or after surgery. Should postoperative  bleeding occur, it may require emergency treatment to drain accumulated blood or you may  require a blood transfusion. Increased activity too soon after surgery  can lead to increased chance of bleeding and additional surgery. It is important to follow postoperative instructions and limit exercise and strenuous activity for the instructed time.  Do not take any aspirin or anti-inflammatory medications for at least ten days before or after surgery, as this may increase the risk of bleeding. Non-prescription herbs and dietary supplements can increase the risk of surgical bleeding. Hematoma can occur at any time, usually in the first three weeks following injury to the operative area. If blood transfusions are necessary to treat blood loss, there is the risk of blood-related infections such as hepatitis and HIV (AIDS). Heparin medications that are used to prevent blood clots in veins can produce bleeding and decreased blood platelets. Infection:  Infection can occur after surgery. Should an infection occur, additional treatment including antibiotics, hospitalization, or additional surgery may be necessary. It is important to tell your surgeon of any other infections, such as ingrown toenail, insect bite, or urinary tract infection. Remote infections, infection in other part of the body, may lead to an infection in the operated area. Scarring: All surgery leaves scars, some more visible than others. Although wound healing after a surgical  procedure is expected, abnormal scars may occur within the skin and deeper tissues. Scars may be unattractive and of different color than the surrounding skin tone. Scar appearance may also vary within the same scar. Scars may be asymmetrical (appear different on the right and left side of the body). There is the possibility of visible marks in the skin from sutures. In some cases, scars may require surgical revision or treatment. Firmness:  Excessive firmness can occur after surgery due to internal scarring. The occurrence of this is not  predictable. Additional treatment including surgery may be necessary. Change in Skin Sensation: It is common to experience diminished (or loss) of skin sensation in areas that have had surgery. Diminished (or complete loss of skin sensation) may not totally resolve. Skin Contour Irregularities:  Contour and shape irregularities may occur.  Visible and palpable wrinkling of skin may occur. Residual  skin irregularities at the ends of the incisions or dog ears are always a possibility when there is  excessive redundant skin. This may improve with time, or it can be surgically corrected. Skin Discoloration / Swelling:  Some bruising and swelling will normally occur. The skin in or near the surgical site can appear either lighter or darker than surrounding skin. Although uncommon, swelling and skin discoloration may persist for long periods of time and, in rare situations, may be permanent. Skin Sensitivity:  Itching, tenderness, or exaggerated responses to hot or cold temperatures may occur after surgery. Usually this resolves during healing, but in some situations it may be chronic. Major Wound Separation:  Wounds may separate after surgery. Should this occur, additional treatment including surgery may be necessary. Sutures:  Most surgical techniques use deep sutures. You may notice these sutures after your surgery. Sutures may spontaneously poke through the skin, become visible or produce irritation that requires suture removal.  Delayed Healing:  Wound disruption or delayed wound healing is possible. Some areas of the skin may not heal normally and may take a long time to heal. Areas of skin may die. This may require frequent dressing changes or further surgery to remove the non-healed tissue. Individuals who have decreased blood supply to tissue from past surgery or radiation therapy may be at increased risk for wound healing and poor surgical outcome. Smokers have a greater risk of skin loss and wound healing complications. Damage to Deeper Structures: There is the potential for injury to deeper structures including nerves, blood vessels, muscles, and lungs (pneumothorax) during any surgical procedure. The potential for this to occur varies according to the type of procedure being performed.  Injury to deeper structures may be temporary or symptoms. If you experience shortness of breath, chest pains, or unusual heart beats, seek medical attention immediately. Should any of these complications occur, you may require hospitalization and additional treatment. Venous Thrombosis and Sequelae: Thrombosed veins, which resemble cords, occasionally develop in the area of the breast or around IV sites, and usually resolve without medical or surgical treatment. It is important to discuss with me surgeon any birth control pills you are taking. Certain high estrogen pills may increase your risk of thrombosed veins. Allergic Reactions:  In Some cases, local allergies to tape, suture material and glues, blood products, topical preparations or injected agents have been reported. Serious systemic reactions including shock (anaphylaxis) may occur in response to drugs used during surgery and prescription medicines. Allergic reactions may require additional treatment. Drug Reactions:  Unexpected drug allergies, lack of proper response to medication, or illness caused by the prescribed drug are possibilities. It is important for you to inform your physician of any problems you have had with any medication or allergies to medication, prescribed or over the counter, as well as medications you now regularly take. Asymmetry:  Symmetrical body appearance may not result after surgery. Factors such as skin tone, fatty deposits, skeletal prominence, and muscle tone may contribute to normal asymmetry in body features. Most patients have differences between the right and left side of their bodies before any surgery is performed. Additional surgery may be necessary to attempt to diminish asymmetry. Surgical Wetting Solutions: There is the possibility that large volumes of fluid containing dilute local anesthetic drugs and epinephrine that is injected into fatty deposits during surgery may contribute to fluid overload or systemic reaction to these medications.  Additional treatment including hospitalization may be necessary. Persistent Swelling (Lymphedema):  Persistent swelling can occur following surgery. Unsatisfactory Result:  Although results are expected, there is no guarantee or warranty expressed or implied, on the  results that may be obtained. The body is not asymmetric and almost everyone has some degree of unevenness which may not be recognized in advance. One side of the face may be slightly larger, one side of the face droopier. The breast and trunk area exhibit the same possibilities. Many of such issues cannot be fully corrected with surgery. The more realistic your expectations as to results, the better your results will be in your eye. Some patients never achieve their desired goals or results, at no fault of the surgeon or surgery. You may be disappointed with the results of surgery. Asymmetry, unanticipated shape and size, loss of function, wound disruption, poor healing, and loss of sensation may occur after surgery. Size may be incorrect. Unsatisfactory surgical scar location or appearance may occur. It may be  necessary to perform additional surgery to improve your results. ADDITIONAL ADVISORIES  Smoking, Second-Hand Smoke Exposure, Nicotine Products (Patch, Gum, Nasal Spray):  Patients who are currently smoking or use tobacco or nicotine products (patch, gum, or nasal spray) are at a greater risk for significant surgical complications of skin dying and delayed healing and additional scarring. Individuals exposed to second-hand smoke are also at potential risk for similar complications attributable to nicotine exposure. Additionally, smoking may have a significant negative effect on anesthesia and recovery from anesthesia, with coughing and possibly increased bleeding. Individuals who are not exposed to tobacco smoke or nicotine-containing products have a significantly lower risk of   this type of complication.   I acknowledge that I will inform my physician if I continue to smoke within this time frame, and understand that for my safety, the surgery, if possible, may be delayed. Smoking may have such a negative effect on your surgery that a urine test just before surgery may be done which will prove the presence of Nicotine. If positive, your surgery may be cancelled and your surgery,   Sleep Apnea / CPAP:   Individuals who have breathing disorders such as Obstructive Sleep Apnea and who may rely upon CPAP devices (constant positive airway pressure) or utilize nighttime oxygen are advised that they are at a substantive risk for respiratory arrest and death when they take narcotic pain medications following surgery. This is an important consideration when evaluating the safety of surgical procedures in terms of very serious complications, including death, that relate to pre-existing medical conditions. Surgery may be considered only with monitoring afterwards in a hospital setting in order to reduce risk of potential respiratory complications and to safely manage pain following surgery. Medications and Herbal Dietary Supplements: There are potential adverse reactions that occur as the result of taking over the counter, herbal, and/or prescription medications. Aspirin and medications that contain aspirin interfere with bleeding. These include non-steroidal anti-inflammatories such as Motrin, Advil, and Aleve. It is very important not to stop drugs that interfere with platelets, such as Plavix, which is used after a stent. It is important if you have had a stent and are taking Plavix that you inform the plastic surgeon. Stopping Plavix may result in a heart attack, stroke and even death. Be sure to check with your prescribing physician prior to stopping any medications. You may have drug interactions that may exist with medications which you are already taking. If you have an adverse reaction, stop the drugs immediately and call for instructions.  If the reaction is severe, go immediately to the nearest emergency room. When taking the prescribed pain medications after surgery, realize that they can affect your thought process and coordination. Do not drive, do not operate complex equipment, do not make any important decisions and do not drink any alcohol while taking these medications. Be sure to take your prescribed medication only as directed. Sun Exposure - Direct or Tanning Salon:  The effects of the sun are damaging to the skin. Exposing the treated areas to sun may result in  increased scarring, color changes, and poor healing. Patients who tan, either outdoors or in a salon, should inform their surgeon and either delay treatment, or avoid tanning until the you are told that it is safe to resume. The damaging effect of sun exposure occurs even with the use sun block or clothing coverage. Travel Plans:  Any surgery holds the risk of complications that may delay healing and your return to normal life. Please let the surgeon know of any travel plans, important commitments already scheduled or planned, or time demands that are important to you, so that appropriate timing of surgery can occur. There are no guarantees that you will be able to resume all activities in the desired time frame. Long-Term Results:  Subsequent alterations in the appearance of your body may occur as the result of aging, sun exposure, weight loss, weight gain, pregnancy, menopause or other circumstances not related to your surgery. Body-Piercing Procedures:  Individuals who currently wear body-piercing jewelry in the surgical region are advised that an infection could develop from this activity. Intimate Relations After Surgery:  Surgery involves coagulating of blood vessels and increased activity of any kind may open these vessels leading to a bleed, or hematoma.  Activity that increases your pulse or heart rate may cause additional bruising, swelling, and the need for return to surgery to control bleeding. It is wise to refrain from intimate physical activities until your physician states it is safe. Mental Health Disorders and Elective Surgery: It is important that all patients seeking to undergo elective surgery have realistic expectations that focus on improvement rather than perfection. Complications or less than satisfactory results are sometimes unavoidable, may require additional surgery and often are stressful. lthough many individuals may benefit psychologically from the results of elective  surgery, effects on mental health cannot be accurately predicted. DVT/PE Risks and Advisory: There is a risk of blood clots, Deep Vein Thrombosis (DVT) and Pulmonary Embolus (PE) with every surgical procedure. It varies with the risk factors below. The higher the risk factors, the greater the risk and the more involved you must be in both understanding these risks and, when permitted by your physician, walking and moving your legs. There may also be leg stockings, squeezing active leg devices, and possibly medicines to help lower your risk. There are many conditions that may increase or affect risks of clotting. Inform your doctor about any past or present history of any of the following:  Past History of Blood Clots  Family History of Blood Clots  Birth Control Pills  Swollen Legs  History of Cancer  Large Dose Vitamins  Varicose Veins  Past Illnesses of the Heart, Liver, Lung, or Gastrointestinal Tract. I understand the risks relating to DVT/PE and how important it is to comply with therapy as  discussed with my surgeon. The methods of preventative therapy include:  Early ambulation when allowed  Compression devices (SCD/ICD)  ASA protocol when allowed (Aspirin)  Heparin protocol when allowed  Enoxaparin protocol when allowed  The risks of DVT/PE may be almost as great as the prophylactic therapy when involving Aspirin, Heparin,  and Exoxaparin.  Be aware that if your surgery is elective, those patients with very high risks should consider not proceeding with such elective surgery. ADDITIONAL SURGERY NECESSARY (Re-Operations)  There are many variable conditions that may influence the long-term result of surgery. It is unknown how your tissue may respond or how wound healing will occur after surgery. Secondary surgery may be necessary to perform additional tightening or repositioning of body structures. Should complications occur, additional surgery or other treatments may be necessary. Even though risks and complications occur infrequently, the risks cited are particularly associated with this surgery. Other complications and risks can occur but are even more uncommon. The practice of medicine and surgery is not an exact science. Although good results are expected, there is no guarantee or warranty expressed or implied, on the results that may be obtained. In some situations, it may not be possible to achieve optimal results with a single surgical procedure. You and your surgeon will discuss the options available should  additional surgery be advised. There may be additional costs and expenses for such additional  procedures, including surgical fees, facility and anesthesia fees, pathology and lab testing. PATIENT COMPLIANCE  Follow all physician instructions carefully; this is essential for the success of your outcome. It is important that the surgical incisions are not subjected to excessive force, swelling, abrasion, or motion during the time of healing. Personal and vocational activity needs to be restricted. Protective dressings and drains should not be removed unless instructed. Successful post-operative function depends on both surgery and subsequent care. Physical activity that increases your pulse or heart rate may cause bruising, swelling, fluid accumulation and the need for return to surgery.  It is wise to refrain from intimate physical activities after surgery until your physician states it is safe. It is important that you participate in follow-up care, return for aftercare, and promote your recovery after surgery. REVISION POLICY  Surgical revision surgery is a common part of elective surgery. Your procedure will not stop you from aging, sagging, scarring, or experiencing ongoing skin changes that are more genetically controlled. If revision surgery is either desired or advisable within one year after the initial surgery, there may be a Fees associated with it. HEALTH INSURANCE  Most health insurance companies exclude coverage for cosmetic surgical operations or any resulting complications. Please carefully review your health insurance subscriber-information pamphlet. Most insurance plans exclude coverage for secondary or revisionary surgery due to complications of cosmetic surgery. It is unethical and fraudulent to bill insurance for cosmetic procedures. We cannot participate in such activities.   .        Electronically signed by:  Jose J Mays MD 11/4/2021

## 2021-11-04 NOTE — OP NOTE
manner, a time-out was performed and everybody in the room was in agreement with the time-out. Then, an incision was made over the inferior border of the incision in the horizontal manner. This was done using #10 blade, then a cautery was used, and dissection was continued in the following manner: To the subcutaneous tissue through the Charisma's to the anterior abdominal wall. Then, the dissection was continued cephalad until the superior markings were made on the right and the left of the umbilicus. Then an incision was made circumferentially around the umbilicus. This was done using a #15 blade. Then dissection was made circumferentially around the umbilicus all the way down to the anterior abdominal wall. Then dissection was continued cephalad to the rib cages bilaterally and down xiphoid centrally. Then the patient was placed in approximately a 30-degree position. The excess skin was excised. Then it was noted there was a rectus diastasis present. The rectus diastases was marked. Then the area was irrigated. All bleeding points were identified were cauterized. Then using an #1 looped PDS the superior part was imbricated to the umbilicus. Then an 3-0 Vicryl was placed at the 12:00 position of the umbilicus. Then a #1 looped PDS was used. The area in the infraumbilical area was imbricated. Then #19 Blakes were placed and brought out through a separate incision. Then the position of the umbilicus was marked. Then the suture line was stapled. Then on 2-0 Vicryl was used in an interrupted manner and after 2-0 Vicryl was placed in an interrupted manner in charisma's fascia. Then 2-0 Vicryl was used in an interrupted manner in the deep tissue. Then 2-0 Eligha Crape was used in the deep dermal.  The umbilicus was brought out through the previously marked area. It was sutured in place using 3-0 Monocryl in the deep tissue. Then 2-0 Eligha Crape was used on the skin in the periumbilical area.   2-0 Prolene was also used on the drain site. Then incisional wound VAC was placed. Then an abdominal binder was placed. The patient tolerated procedure well. Patient was transferred to recovery room in stable condition.     Electronically signed by Luis Rutherford MD on 11/4/2021 at 2:38 PM

## 2021-11-08 LAB — SURGICAL PATHOLOGY REPORT: NORMAL

## 2021-11-09 ENCOUNTER — TELEPHONE (OUTPATIENT)
Dept: SURGERY | Age: 51
End: 2021-11-09

## 2021-11-12 ENCOUNTER — OFFICE VISIT (OUTPATIENT)
Dept: SURGERY | Age: 51
End: 2021-11-12

## 2021-11-12 VITALS
RESPIRATION RATE: 12 BRPM | WEIGHT: 175 LBS | HEART RATE: 90 BPM | BODY MASS INDEX: 28.12 KG/M2 | HEIGHT: 66 IN | OXYGEN SATURATION: 100 %

## 2021-11-12 DIAGNOSIS — Z09 POSTOPERATIVE EXAMINATION: Primary | ICD-10-CM

## 2021-11-12 PROCEDURE — 99024 POSTOP FOLLOW-UP VISIT: CPT | Performed by: PLASTIC SURGERY

## 2021-11-12 NOTE — PROGRESS NOTES
[facility]  [address] [place] [address],[address]  401 [facility] [address]       OFFICE POST-OP NOTE    Patient Name:  Tea Herbert    :  1970    MRN:  [id number]  STATUS POST  Chief Complaint   Patient presents with    Post-Op Check     panniculectomy DOS 21       SUBJECTIVE  Patient seen and examined. S/p panniculectomy and abdominoplasty. The pannus weight 7.156 pounds. The pathology was consistent with skin and subcutaneous tissue. PHYSICAL EXAM  Vital Signs:  Pulse 90   Resp 12   Ht 5' 6\" (1.676 m)   Wt 175 lb (79.4 kg)   SpO2 100%   BMI 28.25 kg/m²     Incisions:  Suture line clean dry and intact. Skin:  No evidence of infection. Neurologic:  Alert & oriented x 3. Lab:  Surgical Pathology Report -- Diagnosis --   SKIN AND SUBCUTIS, PANNICULECTOMY:     BENIGN SKIN AND SUBCUTIS       Jonathan Lamb. Thelma Reed D.O.   **Electronically Signed Out**         [facility]/2021         Clinical Information   Pre-op Diagnosis:  SYMPTOMATIC PANNICULUS, RECTUS DIASTASIS   Operative Findings:  PANNUS   Operation Performed:  PANNICULECTOMY, ABDOMINOPLASTY WITH BIOPATCH   AROUND PANTERA DRAIN AND INCISIONAL WOUND VAC WITH PRE-OP TAP BLOCK AND 1ST   ASSIST     Source of Specimen   1: PANNUS     Gross Description   \"[place], PANNUS\" 337.76 grams, 28.5 x 28.3 x 9.5 cm aggregate   of multiple irregular fragments of multilobulated tan fibrofatty   tissue, the largest two of which are partially surfaced by wrinkled   tan, unremarkable showing no definitive mass or pigmented lesions. Sectioning reveals minimal (< 10%) rubbery white unremarkable fibrous   tissue interspersed with tan unremarkable fatty tissue.  No definitive   mass lesion, hemorrhage or necrosis is identified.  Representative   sections 1cs.  tm       Microscopic Description   Microscopic examination performed. SURGICAL PATHOLOGY CONSULTATION         Patient Name: Vasyl Johnson    Med Rec: [id number]   Path Number: [id number]     [facility]   ANATOMIC PATHOLOGY   [address] [place] [address], [facility] [address]. [place], [address]   [phone]   Fax: [phone]          ASSESSMENT   Diagnosis Orders   1. Postoperative examination         PLAN  1. Continue abdominal binder. We will remove the right drain  Patient is to continue drain care of the left drain  Follow-up in 1 week. Plan discussed with patient.     Electronically signed by:  Misa Galindo M.D.   11/12/2021

## 2021-11-17 ENCOUNTER — OFFICE VISIT (OUTPATIENT)
Dept: SURGERY | Age: 51
End: 2021-11-17

## 2021-11-17 VITALS
HEIGHT: 66 IN | OXYGEN SATURATION: 100 % | RESPIRATION RATE: 12 BRPM | WEIGHT: 165 LBS | BODY MASS INDEX: 26.52 KG/M2 | HEART RATE: 90 BPM

## 2021-11-17 DIAGNOSIS — Z09 POSTOPERATIVE EXAMINATION: Primary | ICD-10-CM

## 2021-11-17 PROCEDURE — 99024 POSTOP FOLLOW-UP VISIT: CPT | Performed by: PLASTIC SURGERY

## 2021-11-17 NOTE — PROGRESS NOTES
[facility]  [address] [place] [address],[address]  401 [facility] [address]       OFFICE POST-OP NOTE    Patient Name:  Lluvia Tirado    :  1970    MRN:  [id number]  STATUS POST  Chief Complaint   Patient presents with    Post-Op Check     panniculectomy DOS 21       SUBJECTIVE  Patient seen and examined. S/p panniculectomy and abdominoplasty. The pannus weight 7.156 pounds. The pathology was consistent with skin and subcutaneous tissue. PHYSICAL EXAM  Vital Signs:  Pulse 90   Resp 12   Ht 5' 6\" (1.676 m)   Wt 165 lb (74.8 kg)   SpO2 100%   BMI 26.63 kg/m²     Incisions:  Suture line clean dry and intact. Skin:  No evidence of infection. Neurologic:  Alert & oriented x 3. Lab:  Surgical Pathology Report -- Diagnosis --   SKIN AND SUBCUTIS, PANNICULECTOMY:     BENIGN SKIN AND SUBCUTIS       Jey Rivera. Mignon Ramirez D.O.   **Electronically Signed Out**         [facility]/2021         Clinical Information   Pre-op Diagnosis:  SYMPTOMATIC PANNICULUS, RECTUS DIASTASIS   Operative Findings:  PANNUS   Operation Performed:  PANNICULECTOMY, ABDOMINOPLASTY WITH BIOPATCH   AROUND PANTERA DRAIN AND INCISIONAL WOUND VAC WITH PRE-OP TAP BLOCK AND 1ST   ASSIST     Source of Specimen   1: PANNUS     Gross Description   \"[place], PANNUS\" 337.76 grams, 28.5 x 28.3 x 9.5 cm aggregate   of multiple irregular fragments of multilobulated tan fibrofatty   tissue, the largest two of which are partially surfaced by wrinkled   tan, unremarkable showing no definitive mass or pigmented lesions. Sectioning reveals minimal (< 10%) rubbery white unremarkable fibrous   tissue interspersed with tan unremarkable fatty tissue.  No definitive   mass lesion, hemorrhage or necrosis is identified.  Representative   sections 1cs.  tm       Microscopic Description   Microscopic examination performed. SURGICAL PATHOLOGY CONSULTATION         Patient Name: Aissatou Samuels    Med Rec: [id number]   Path Number: [id number]     [facility]   CONSULTING PATHOLOGISTS [facility]POR[facility]   ANATOMIC PATHOLOGY   [address] [place] [address], [facility] [address]. [place], [address]   [phone]   Fax: [phone]          ASSESSMENT   Diagnosis Orders   1. Postoperative examination         PLAN  1. Continue abdominal binder. Patient is to continue drain care of the left drain  Follow-up in 1 week. Plan discussed with patient.     Electronically signed by:  Yeni Argueta M.D.   11/17/2021

## 2021-11-24 ENCOUNTER — OFFICE VISIT (OUTPATIENT)
Dept: SURGERY | Age: 51
End: 2021-11-24

## 2021-11-24 VITALS
HEIGHT: 66 IN | OXYGEN SATURATION: 99 % | WEIGHT: 165 LBS | RESPIRATION RATE: 12 BRPM | BODY MASS INDEX: 26.52 KG/M2 | HEART RATE: 90 BPM

## 2021-11-24 DIAGNOSIS — Z09 POSTOPERATIVE EXAMINATION: Primary | ICD-10-CM

## 2021-11-24 PROCEDURE — 99024 POSTOP FOLLOW-UP VISIT: CPT | Performed by: PLASTIC SURGERY

## 2021-11-24 NOTE — PROGRESS NOTES
[facility]  [address] [place] [address],[address]  401 [facility] [address]       OFFICE POST-OP NOTE    Patient Name:  Jensen Ann    :  1970    MRN:  [id number]  STATUS POST  Chief Complaint   Patient presents with    Post-Op Check     panniculectomy DOS 21       SUBJECTIVE  Patient seen and examined. S/p panniculectomy and abdominoplasty. The pannus weight 7.156 pounds. The pathology was consistent with skin and subcutaneous tissue. PHYSICAL EXAM  Vital Signs:  Pulse 90   Resp 12   Ht 5' 6\" (1.676 m)   Wt 165 lb (74.8 kg)   SpO2 99%   BMI 26.63 kg/m²     Incisions:  Suture line clean dry and intact. Patient is healing well. The swelling is decreasing. Skin:  No evidence of infection. Neurologic:  Alert & oriented x 3. Lab:  Surgical Pathology Report -- Diagnosis --   SKIN AND SUBCUTIS, PANNICULECTOMY:     BENIGN SKIN AND SUBCUTIS       Yuki Cantu. Jenni Rico D.O.   **Electronically Signed Out**         [facility]/2021         Clinical Information   Pre-op Diagnosis:  SYMPTOMATIC PANNICULUS, RECTUS DIASTASIS   Operative Findings:  PANNUS   Operation Performed:  PANNICULECTOMY, ABDOMINOPLASTY WITH BIOPATCH   AROUND PANTERA DRAIN AND INCISIONAL WOUND VAC WITH PRE-OP TAP BLOCK AND 1ST   ASSIST     Source of Specimen   1: PANNUS     Gross Description   \"[place], PANNUS\" 337.76 grams, 28.5 x 28.3 x 9.5 cm aggregate   of multiple irregular fragments of multilobulated tan fibrofatty   tissue, the largest two of which are partially surfaced by wrinkled   tan, unremarkable showing no definitive mass or pigmented lesions. Sectioning reveals minimal (< 10%) rubbery white unremarkable fibrous   tissue interspersed with tan unremarkable fatty tissue.  No definitive   mass lesion, hemorrhage or necrosis is identified.  Representative   sections 1cs.  tm       Microscopic Description   Microscopic examination performed.      SURGICAL PATHOLOGY CONSULTATION     Patient Name: Hortencia Campo Med Rec: [id number]   Path Number: [id number]     [facility]   ANATOMIC PATHOLOGY   [address] [place] [address] [address] [place], [address]   [phone]   Fax: [phone]          ASSESSMENT   Diagnosis Orders   1. Postoperative examination         PLAN  Continue wearing abdominal binder. We will remove the second drain. We will paint the umbilicus with Betadine. Follow-up in 2 to 3 weeks. Plan discussed with patient.     Electronically signed by:  Patrick Vargas M.D.   11/24/2021

## 2021-12-15 ENCOUNTER — OFFICE VISIT (OUTPATIENT)
Dept: SURGERY | Age: 51
End: 2021-12-15

## 2021-12-15 VITALS
BODY MASS INDEX: 26.52 KG/M2 | RESPIRATION RATE: 12 BRPM | OXYGEN SATURATION: 100 % | WEIGHT: 165 LBS | HEIGHT: 66 IN | HEART RATE: 90 BPM

## 2021-12-15 DIAGNOSIS — Z09 POSTOPERATIVE EXAMINATION: Primary | ICD-10-CM

## 2021-12-15 PROCEDURE — 99024 POSTOP FOLLOW-UP VISIT: CPT | Performed by: PLASTIC SURGERY

## 2021-12-15 NOTE — PROGRESS NOTES
[facility] SPECIAL[facility]  [address] [place] [address],[address]  401 [facility] [address]       OFFICE POST-OP NOTE    Patient Name:  John Castro    :  1970    MRN:  [id number]  STATUS POST  Chief Complaint   Patient presents with    Post-Op Check     panniculectomy DOS 21       SUBJECTIVE  Patient seen and examined. S/p panniculectomy and abdominoplasty. The pannus weight 7.156 pounds. The pathology was consistent with skin and subcutaneous tissue. PHYSICAL EXAM  Vital Signs:  Pulse 90   Resp 12   Ht 5' 6\" (1.676 m)   Wt 165 lb (74.8 kg)   SpO2 100%   BMI 26.63 kg/m²     Incisions:  Suture line clean dry and intact. Patient is healing well. The swelling is decreasing. Skin:  No evidence of infection. Neurologic:  Alert & oriented x 3. Lab:  Surgical Pathology Report -- Diagnosis --   SKIN AND SUBCUTIS, PANNICULECTOMY:     BENIGN SKIN AND SUBCUTIS       Tanner Edwards. Lionel Holland D.O.   **Electronically Signed Out**         [facility]/2021         Clinical Information   Pre-op Diagnosis:  SYMPTOMATIC PANNICULUS, RECTUS DIASTASIS   Operative Findings:  PANNUS   Operation Performed:  PANNICULECTOMY, ABDOMINOPLASTY WITH BIOPATCH   AROUND PANTERA DRAIN AND INCISIONAL WOUND VAC WITH PRE-OP TAP BLOCK AND 1ST   ASSIST     Source of Specimen   1: PANNUS     Gross Description   \"[place], PANNUS\" 337.76 grams, 28.5 x 28.3 x 9.5 cm aggregate   of multiple irregular fragments of multilobulated tan fibrofatty   tissue, the largest two of which are partially surfaced by wrinkled   tan, unremarkable showing no definitive mass or pigmented lesions. Sectioning reveals minimal (< 10%) rubbery white unremarkable fibrous   tissue interspersed with tan unremarkable fatty tissue.  No definitive   mass lesion, hemorrhage or necrosis is identified.  Representative   sections 1cs.  tm       Microscopic Description   Microscopic examination performed.      SURGICAL PATHOLOGY CONSULTATION     Patient Name: Negro Brown [facility] Rec: [id number]   Path Number: [id number]     [facility]   ANATOMIC PATHOLOGY   [address] [place] [address], [place] [address]. [place], [address]   [phone]   Fax: [phone]          ASSESSMENT   Diagnosis Orders   1. Postoperative examination         PLAN  Continue wearing abdominal binder. The umbilicus is continuing to heal.  Follow-up in 1 month. Plan discussed with patient.     Electronically signed by:  Sara Calvillo M.D.   12/15/2021

## 2021-12-31 ENCOUNTER — HOSPITAL ENCOUNTER (INPATIENT)
Age: 51
LOS: 3 days | Discharge: HOME | DRG: 175 | End: 2022-01-03
Attending: EMERGENCY MEDICINE | Admitting: FAMILY MEDICINE
Payer: COMMERCIAL

## 2021-12-31 ENCOUNTER — APPOINTMENT (OUTPATIENT)
Dept: CT IMAGING | Age: 51
DRG: 175 | End: 2021-12-31
Payer: COMMERCIAL

## 2021-12-31 DIAGNOSIS — I26.99 PULMONARY EMBOLISM WITHOUT ACUTE COR PULMONALE, UNSPECIFIED CHRONICITY, UNSPECIFIED PULMONARY EMBOLISM TYPE (HCC): Primary | ICD-10-CM

## 2021-12-31 LAB
ABSOLUTE EOS #: 0.32 K/UL (ref 0–0.44)
ABSOLUTE IMMATURE GRANULOCYTE: 0.01 K/UL (ref 0–0.3)
ABSOLUTE LYMPH #: 1.14 K/UL (ref 1.1–3.7)
ABSOLUTE MONO #: 0.43 K/UL (ref 0.1–1.2)
ALBUMIN SERPL-MCNC: 4.1 G/DL (ref 3.5–5.2)
ALBUMIN/GLOBULIN RATIO: ABNORMAL (ref 1–2.5)
ALP BLD-CCNC: 76 U/L (ref 35–104)
ALT SERPL-CCNC: 15 U/L (ref 5–33)
ANION GAP SERPL CALCULATED.3IONS-SCNC: 10 MMOL/L (ref 9–17)
AST SERPL-CCNC: 21 U/L
BASOPHILS # BLD: 1 % (ref 0–2)
BASOPHILS ABSOLUTE: 0.04 K/UL (ref 0–0.2)
BILIRUB SERPL-MCNC: 0.38 MG/DL (ref 0.3–1.2)
BUN BLDV-MCNC: 5 MG/DL (ref 6–20)
BUN/CREAT BLD: 8 (ref 9–20)
CALCIUM SERPL-MCNC: 8.9 MG/DL (ref 8.6–10.4)
CHLORIDE BLD-SCNC: 105 MMOL/L (ref 98–107)
CO2: 25 MMOL/L (ref 20–31)
CREAT SERPL-MCNC: 0.6 MG/DL (ref 0.5–0.9)
D-DIMER QUANTITATIVE: 0.57 MG/L FEU (ref 0–0.59)
DIFFERENTIAL TYPE: ABNORMAL
EOSINOPHILS RELATIVE PERCENT: 5 % (ref 1–4)
GFR AFRICAN AMERICAN: >60 ML/MIN
GFR NON-AFRICAN AMERICAN: >60 ML/MIN
GFR SERPL CREATININE-BSD FRML MDRD: ABNORMAL ML/MIN/{1.73_M2}
GFR SERPL CREATININE-BSD FRML MDRD: ABNORMAL ML/MIN/{1.73_M2}
GLUCOSE BLD-MCNC: 95 MG/DL (ref 70–99)
HCT VFR BLD CALC: 41.1 % (ref 36.3–47.1)
HEMOGLOBIN: 13.2 G/DL (ref 11.9–15.1)
IMMATURE GRANULOCYTES: 0 %
INR BLD: 1
LYMPHOCYTES # BLD: 19 % (ref 24–43)
MCH RBC QN AUTO: 27.4 PG (ref 25.2–33.5)
MCHC RBC AUTO-ENTMCNC: 32.1 G/DL (ref 28.4–34.8)
MCV RBC AUTO: 85.4 FL (ref 82.6–102.9)
MONOCYTES # BLD: 7 % (ref 3–12)
MYOGLOBIN: 23 NG/ML (ref 25–58)
MYOGLOBIN: <21 NG/ML (ref 25–58)
MYOGLOBIN: <21 NG/ML (ref 25–58)
NRBC AUTOMATED: 0 PER 100 WBC
PARTIAL THROMBOPLASTIN TIME: 32.3 SEC (ref 23.9–33.8)
PDW BLD-RTO: 12.9 % (ref 11.8–14.4)
PLATELET # BLD: 248 K/UL (ref 138–453)
PLATELET ESTIMATE: ABNORMAL
PMV BLD AUTO: 9.1 FL (ref 8.1–13.5)
POTASSIUM SERPL-SCNC: 3.7 MMOL/L (ref 3.7–5.3)
PROTHROMBIN TIME: 13 SEC (ref 11.5–14.2)
RBC # BLD: 4.81 M/UL (ref 3.95–5.11)
RBC # BLD: ABNORMAL 10*6/UL
SARS-COV-2, RAPID: NOT DETECTED
SEG NEUTROPHILS: 68 % (ref 36–65)
SEGMENTED NEUTROPHILS ABSOLUTE COUNT: 3.96 K/UL (ref 1.5–8.1)
SODIUM BLD-SCNC: 140 MMOL/L (ref 135–144)
SPECIMEN DESCRIPTION: NORMAL
TOTAL PROTEIN: 7 G/DL (ref 6.4–8.3)
TROPONIN INTERP: ABNORMAL
TROPONIN T: ABNORMAL NG/ML
TROPONIN, HIGH SENSITIVITY: <6 NG/L (ref 0–14)
WBC # BLD: 5.9 K/UL (ref 3.5–11.3)
WBC # BLD: ABNORMAL 10*3/UL

## 2021-12-31 PROCEDURE — 99222 1ST HOSP IP/OBS MODERATE 55: CPT | Performed by: NURSE PRACTITIONER

## 2021-12-31 PROCEDURE — 36415 COLL VENOUS BLD VENIPUNCTURE: CPT

## 2021-12-31 PROCEDURE — 85025 COMPLETE CBC W/AUTO DIFF WBC: CPT

## 2021-12-31 PROCEDURE — 2060000000 HC ICU INTERMEDIATE R&B

## 2021-12-31 PROCEDURE — 85379 FIBRIN DEGRADATION QUANT: CPT

## 2021-12-31 PROCEDURE — 2580000003 HC RX 258: Performed by: EMERGENCY MEDICINE

## 2021-12-31 PROCEDURE — 85730 THROMBOPLASTIN TIME PARTIAL: CPT

## 2021-12-31 PROCEDURE — 2580000003 HC RX 258: Performed by: PHYSICIAN ASSISTANT

## 2021-12-31 PROCEDURE — 99284 EMERGENCY DEPT VISIT MOD MDM: CPT

## 2021-12-31 PROCEDURE — 83874 ASSAY OF MYOGLOBIN: CPT

## 2021-12-31 PROCEDURE — 85610 PROTHROMBIN TIME: CPT

## 2021-12-31 PROCEDURE — 6360000004 HC RX CONTRAST MEDICATION: Performed by: EMERGENCY MEDICINE

## 2021-12-31 PROCEDURE — 80053 COMPREHEN METABOLIC PANEL: CPT

## 2021-12-31 PROCEDURE — 93005 ELECTROCARDIOGRAM TRACING: CPT | Performed by: PHYSICIAN ASSISTANT

## 2021-12-31 PROCEDURE — 6360000002 HC RX W HCPCS: Performed by: PHYSICIAN ASSISTANT

## 2021-12-31 PROCEDURE — 84484 ASSAY OF TROPONIN QUANT: CPT

## 2021-12-31 PROCEDURE — 87635 SARS-COV-2 COVID-19 AMP PRB: CPT

## 2021-12-31 PROCEDURE — 71260 CT THORAX DX C+: CPT

## 2021-12-31 RX ORDER — 0.9 % SODIUM CHLORIDE 0.9 %
80 INTRAVENOUS SOLUTION INTRAVENOUS ONCE
Status: COMPLETED | OUTPATIENT
Start: 2021-12-31 | End: 2021-12-31

## 2021-12-31 RX ORDER — SODIUM CHLORIDE 0.9 % (FLUSH) 0.9 %
10 SYRINGE (ML) INJECTION PRN
Status: DISCONTINUED | OUTPATIENT
Start: 2021-12-31 | End: 2022-01-03 | Stop reason: HOSPADM

## 2021-12-31 RX ORDER — HEPARIN SODIUM 1000 [USP'U]/ML
40 INJECTION, SOLUTION INTRAVENOUS; SUBCUTANEOUS PRN
Status: DISCONTINUED | OUTPATIENT
Start: 2021-12-31 | End: 2022-01-01 | Stop reason: SDUPTHER

## 2021-12-31 RX ORDER — HEPARIN SODIUM 1000 [USP'U]/ML
80 INJECTION, SOLUTION INTRAVENOUS; SUBCUTANEOUS ONCE
Status: COMPLETED | OUTPATIENT
Start: 2021-12-31 | End: 2021-12-31

## 2021-12-31 RX ORDER — HEPARIN SODIUM 1000 [USP'U]/ML
80 INJECTION, SOLUTION INTRAVENOUS; SUBCUTANEOUS PRN
Status: DISCONTINUED | OUTPATIENT
Start: 2021-12-31 | End: 2022-01-01 | Stop reason: SDUPTHER

## 2021-12-31 RX ORDER — HEPARIN SODIUM 10000 [USP'U]/100ML
5-30 INJECTION, SOLUTION INTRAVENOUS CONTINUOUS
Status: DISCONTINUED | OUTPATIENT
Start: 2021-12-31 | End: 2022-01-01 | Stop reason: SDUPTHER

## 2021-12-31 RX ADMIN — HEPARIN SODIUM 18 UNITS/KG/HR: 10000 INJECTION INTRAVENOUS; SUBCUTANEOUS at 23:26

## 2021-12-31 RX ADMIN — SODIUM CHLORIDE, PRESERVATIVE FREE 10 ML: 5 INJECTION INTRAVENOUS at 21:12

## 2021-12-31 RX ADMIN — HEPARIN SODIUM 6020 UNITS: 1000 INJECTION INTRAVENOUS; SUBCUTANEOUS at 23:23

## 2021-12-31 RX ADMIN — IOPAMIDOL 75 ML: 755 INJECTION, SOLUTION INTRAVENOUS at 21:12

## 2021-12-31 RX ADMIN — CEFTRIAXONE SODIUM 1000 MG: 1 INJECTION, POWDER, FOR SOLUTION INTRAMUSCULAR; INTRAVENOUS at 23:29

## 2021-12-31 RX ADMIN — SODIUM CHLORIDE 80 ML: 9 INJECTION, SOLUTION INTRAVENOUS at 21:12

## 2021-12-31 ASSESSMENT — PAIN SCALES - GENERAL: PAINLEVEL_OUTOF10: 3

## 2021-12-31 ASSESSMENT — PAIN DESCRIPTION - DESCRIPTORS: DESCRIPTORS: SQUEEZING

## 2021-12-31 ASSESSMENT — PAIN DESCRIPTION - FREQUENCY: FREQUENCY: INTERMITTENT

## 2021-12-31 ASSESSMENT — PAIN DESCRIPTION - LOCATION
LOCATION: CHEST
LOCATION: CHEST

## 2021-12-31 ASSESSMENT — PAIN DESCRIPTION - ONSET: ONSET: SUDDEN

## 2021-12-31 ASSESSMENT — PAIN DESCRIPTION - PAIN TYPE: TYPE: ACUTE PAIN

## 2022-01-01 LAB
ABSOLUTE EOS #: 0.44 K/UL (ref 0–0.44)
ABSOLUTE IMMATURE GRANULOCYTE: 0.02 K/UL (ref 0–0.3)
ABSOLUTE LYMPH #: 1.8 K/UL (ref 1.1–3.7)
ABSOLUTE MONO #: 0.6 K/UL (ref 0.1–1.2)
ALBUMIN SERPL-MCNC: 3.9 G/DL (ref 3.5–5.2)
ALBUMIN/GLOBULIN RATIO: NORMAL (ref 1–2.5)
ALP BLD-CCNC: 81 U/L (ref 35–104)
ALT SERPL-CCNC: 14 U/L (ref 5–33)
ANION GAP SERPL CALCULATED.3IONS-SCNC: 11 MMOL/L (ref 9–17)
ANTI-XA UNFRAC HEPARIN: 0.48 IU/L (ref 0.3–0.7)
ANTI-XA UNFRAC HEPARIN: 0.65 IU/L (ref 0.3–0.7)
AST SERPL-CCNC: 21 U/L
BASOPHILS # BLD: 1 % (ref 0–2)
BASOPHILS ABSOLUTE: 0.05 K/UL (ref 0–0.2)
BILIRUB SERPL-MCNC: 0.32 MG/DL (ref 0.3–1.2)
BUN BLDV-MCNC: 8 MG/DL (ref 6–20)
BUN/CREAT BLD: 11 (ref 9–20)
CALCIUM SERPL-MCNC: 8.7 MG/DL (ref 8.6–10.4)
CHLORIDE BLD-SCNC: 107 MMOL/L (ref 98–107)
CO2: 24 MMOL/L (ref 20–31)
CREAT SERPL-MCNC: 0.75 MG/DL (ref 0.5–0.9)
DIFFERENTIAL TYPE: ABNORMAL
EOSINOPHILS RELATIVE PERCENT: 6 % (ref 1–4)
FERRITIN: 72 UG/L (ref 13–150)
GFR AFRICAN AMERICAN: >60 ML/MIN
GFR NON-AFRICAN AMERICAN: >60 ML/MIN
GFR SERPL CREATININE-BSD FRML MDRD: NORMAL ML/MIN/{1.73_M2}
GFR SERPL CREATININE-BSD FRML MDRD: NORMAL ML/MIN/{1.73_M2}
GLUCOSE BLD-MCNC: 86 MG/DL (ref 70–99)
HCT VFR BLD CALC: 40.1 % (ref 36.3–47.1)
HEMOGLOBIN: 12.8 G/DL (ref 11.9–15.1)
IMMATURE GRANULOCYTES: 0 %
INR BLD: 1
LACTATE DEHYDROGENASE: 154 U/L (ref 135–214)
LYMPHOCYTES # BLD: 26 % (ref 24–43)
MCH RBC QN AUTO: 27.4 PG (ref 25.2–33.5)
MCHC RBC AUTO-ENTMCNC: 31.9 G/DL (ref 28.4–34.8)
MCV RBC AUTO: 85.7 FL (ref 82.6–102.9)
MONOCYTES # BLD: 9 % (ref 3–12)
NRBC AUTOMATED: 0 PER 100 WBC
PARTIAL THROMBOPLASTIN TIME: 138 SEC (ref 23.9–33.8)
PDW BLD-RTO: 12.9 % (ref 11.8–14.4)
PLATELET # BLD: 251 K/UL (ref 138–453)
PLATELET ESTIMATE: ABNORMAL
PMV BLD AUTO: 9.2 FL (ref 8.1–13.5)
POTASSIUM SERPL-SCNC: 3.9 MMOL/L (ref 3.7–5.3)
PROCALCITONIN: 0.06 NG/ML
PROTHROMBIN TIME: 13.5 SEC (ref 11.5–14.2)
RBC # BLD: 4.68 M/UL (ref 3.95–5.11)
RBC # BLD: ABNORMAL 10*6/UL
SEG NEUTROPHILS: 58 % (ref 36–65)
SEGMENTED NEUTROPHILS ABSOLUTE COUNT: 4.11 K/UL (ref 1.5–8.1)
SODIUM BLD-SCNC: 142 MMOL/L (ref 135–144)
TOTAL PROTEIN: 6.4 G/DL (ref 6.4–8.3)
TROPONIN INTERP: NORMAL
TROPONIN T: NORMAL NG/ML
TROPONIN, HIGH SENSITIVITY: 6 NG/L (ref 0–14)
VITAMIN D 25-HYDROXY: 11 NG/ML (ref 30–100)
WBC # BLD: 7 K/UL (ref 3.5–11.3)
WBC # BLD: ABNORMAL 10*3/UL

## 2022-01-01 PROCEDURE — 2060000000 HC ICU INTERMEDIATE R&B

## 2022-01-01 PROCEDURE — 2580000003 HC RX 258: Performed by: NURSE PRACTITIONER

## 2022-01-01 PROCEDURE — 85610 PROTHROMBIN TIME: CPT

## 2022-01-01 PROCEDURE — 85025 COMPLETE CBC W/AUTO DIFF WBC: CPT

## 2022-01-01 PROCEDURE — 93005 ELECTROCARDIOGRAM TRACING: CPT | Performed by: NURSE PRACTITIONER

## 2022-01-01 PROCEDURE — 36415 COLL VENOUS BLD VENIPUNCTURE: CPT

## 2022-01-01 PROCEDURE — 83615 LACTATE (LD) (LDH) ENZYME: CPT

## 2022-01-01 PROCEDURE — 6360000002 HC RX W HCPCS: Performed by: NURSE PRACTITIONER

## 2022-01-01 PROCEDURE — 6360000002 HC RX W HCPCS: Performed by: PHYSICIAN ASSISTANT

## 2022-01-01 PROCEDURE — 2580000003 HC RX 258: Performed by: PHYSICIAN ASSISTANT

## 2022-01-01 PROCEDURE — 84484 ASSAY OF TROPONIN QUANT: CPT

## 2022-01-01 PROCEDURE — 82728 ASSAY OF FERRITIN: CPT

## 2022-01-01 PROCEDURE — 85520 HEPARIN ASSAY: CPT

## 2022-01-01 PROCEDURE — 85730 THROMBOPLASTIN TIME PARTIAL: CPT

## 2022-01-01 PROCEDURE — 84145 PROCALCITONIN (PCT): CPT

## 2022-01-01 PROCEDURE — 82306 VITAMIN D 25 HYDROXY: CPT

## 2022-01-01 PROCEDURE — 99232 SBSQ HOSP IP/OBS MODERATE 35: CPT | Performed by: FAMILY MEDICINE

## 2022-01-01 PROCEDURE — 80053 COMPREHEN METABOLIC PANEL: CPT

## 2022-01-01 PROCEDURE — 6370000000 HC RX 637 (ALT 250 FOR IP): Performed by: NURSE PRACTITIONER

## 2022-01-01 PROCEDURE — 87040 BLOOD CULTURE FOR BACTERIA: CPT

## 2022-01-01 RX ORDER — SENNA AND DOCUSATE SODIUM 50; 8.6 MG/1; MG/1
2 TABLET, FILM COATED ORAL DAILY
Status: DISCONTINUED | OUTPATIENT
Start: 2022-01-01 | End: 2022-01-03 | Stop reason: HOSPADM

## 2022-01-01 RX ORDER — ONDANSETRON 2 MG/ML
4 INJECTION INTRAMUSCULAR; INTRAVENOUS EVERY 6 HOURS PRN
Status: DISCONTINUED | OUTPATIENT
Start: 2022-01-01 | End: 2022-01-03 | Stop reason: HOSPADM

## 2022-01-01 RX ORDER — ACETAMINOPHEN 325 MG/1
650 TABLET ORAL EVERY 6 HOURS PRN
Status: DISCONTINUED | OUTPATIENT
Start: 2022-01-01 | End: 2022-01-03 | Stop reason: HOSPADM

## 2022-01-01 RX ORDER — HEPARIN SODIUM 1000 [USP'U]/ML
80 INJECTION, SOLUTION INTRAVENOUS; SUBCUTANEOUS PRN
Status: DISCONTINUED | OUTPATIENT
Start: 2022-01-01 | End: 2022-01-02

## 2022-01-01 RX ORDER — ACETAMINOPHEN 650 MG/1
650 SUPPOSITORY RECTAL EVERY 6 HOURS PRN
Status: DISCONTINUED | OUTPATIENT
Start: 2022-01-01 | End: 2022-01-03 | Stop reason: HOSPADM

## 2022-01-01 RX ORDER — HEPARIN SODIUM 1000 [USP'U]/ML
40 INJECTION, SOLUTION INTRAVENOUS; SUBCUTANEOUS PRN
Status: DISCONTINUED | OUTPATIENT
Start: 2022-01-01 | End: 2022-01-02

## 2022-01-01 RX ORDER — ONDANSETRON 4 MG/1
4 TABLET, ORALLY DISINTEGRATING ORAL EVERY 8 HOURS PRN
Status: DISCONTINUED | OUTPATIENT
Start: 2022-01-01 | End: 2022-01-03 | Stop reason: HOSPADM

## 2022-01-01 RX ORDER — HEPARIN SODIUM 10000 [USP'U]/100ML
5-30 INJECTION, SOLUTION INTRAVENOUS CONTINUOUS
Status: DISCONTINUED | OUTPATIENT
Start: 2022-01-01 | End: 2022-01-02

## 2022-01-01 RX ORDER — SODIUM CHLORIDE 0.9 % (FLUSH) 0.9 %
5-40 SYRINGE (ML) INJECTION EVERY 12 HOURS SCHEDULED
Status: DISCONTINUED | OUTPATIENT
Start: 2022-01-01 | End: 2022-01-03 | Stop reason: HOSPADM

## 2022-01-01 RX ORDER — SODIUM CHLORIDE 9 MG/ML
25 INJECTION, SOLUTION INTRAVENOUS PRN
Status: DISCONTINUED | OUTPATIENT
Start: 2022-01-01 | End: 2022-01-03 | Stop reason: HOSPADM

## 2022-01-01 RX ORDER — SODIUM CHLORIDE 0.9 % (FLUSH) 0.9 %
10 SYRINGE (ML) INJECTION PRN
Status: DISCONTINUED | OUTPATIENT
Start: 2022-01-01 | End: 2022-01-03 | Stop reason: HOSPADM

## 2022-01-01 RX ORDER — PANTOPRAZOLE SODIUM 40 MG/1
40 TABLET, DELAYED RELEASE ORAL
Status: DISCONTINUED | OUTPATIENT
Start: 2022-01-01 | End: 2022-01-03 | Stop reason: HOSPADM

## 2022-01-01 RX ADMIN — HEPARIN SODIUM AND DEXTROSE 18 UNITS/KG/HR: 10000; 5 INJECTION INTRAVENOUS at 18:25

## 2022-01-01 RX ADMIN — ACETAMINOPHEN 650 MG: 325 TABLET ORAL at 06:48

## 2022-01-01 RX ADMIN — ACETAMINOPHEN 650 MG: 325 TABLET ORAL at 21:26

## 2022-01-01 RX ADMIN — PANTOPRAZOLE SODIUM 40 MG: 40 TABLET, DELAYED RELEASE ORAL at 06:18

## 2022-01-01 RX ADMIN — HEPARIN SODIUM AND DEXTROSE 18 UNITS/KG/HR: 10000; 5 INJECTION INTRAVENOUS at 00:42

## 2022-01-01 RX ADMIN — AZITHROMYCIN MONOHYDRATE 500 MG: 500 INJECTION, POWDER, LYOPHILIZED, FOR SOLUTION INTRAVENOUS at 00:16

## 2022-01-01 RX ADMIN — SENNOSIDES AND DOCUSATE SODIUM 2 TABLET: 50; 8.6 TABLET ORAL at 09:35

## 2022-01-01 RX ADMIN — SODIUM CHLORIDE, PRESERVATIVE FREE 10 ML: 5 INJECTION INTRAVENOUS at 09:36

## 2022-01-01 RX ADMIN — ACETAMINOPHEN 650 MG: 325 TABLET ORAL at 01:42

## 2022-01-01 RX ADMIN — SODIUM CHLORIDE, PRESERVATIVE FREE 10 ML: 5 INJECTION INTRAVENOUS at 21:26

## 2022-01-01 ASSESSMENT — PAIN SCALES - GENERAL
PAINLEVEL_OUTOF10: 3
PAINLEVEL_OUTOF10: 0
PAINLEVEL_OUTOF10: 2
PAINLEVEL_OUTOF10: 2
PAINLEVEL_OUTOF10: 5
PAINLEVEL_OUTOF10: 4
PAINLEVEL_OUTOF10: 3

## 2022-01-01 ASSESSMENT — ENCOUNTER SYMPTOMS
COUGH: 1
VOMITING: 0
COLOR CHANGE: 0
DIARRHEA: 0
CHEST TIGHTNESS: 1
NAUSEA: 0
ABDOMINAL PAIN: 0
SHORTNESS OF BREATH: 1
CONSTIPATION: 0
BLOOD IN STOOL: 0

## 2022-01-01 ASSESSMENT — PAIN DESCRIPTION - LOCATION: LOCATION: HEAD

## 2022-01-01 ASSESSMENT — PAIN DESCRIPTION - FREQUENCY: FREQUENCY: INTERMITTENT

## 2022-01-01 ASSESSMENT — PAIN DESCRIPTION - ONSET: ONSET: GRADUAL

## 2022-01-01 ASSESSMENT — PAIN DESCRIPTION - DESCRIPTORS: DESCRIPTORS: ACHING

## 2022-01-01 ASSESSMENT — PAIN DESCRIPTION - PAIN TYPE: TYPE: ACUTE PAIN

## 2022-01-01 NOTE — FLOWSHEET NOTE
Patient busy with RN. Silent prayer shared . Follow up as needed.      01/01/22 1552   Encounter Summary   Services provided to: Patient   Referral/Consult From: Rounding   Support System Unknown   Continue Visiting   (1-1-22)   Complexity of Encounter Low   Length of Encounter 15 minutes   Routine   Type Initial   Assessment Calm   Intervention Prayer

## 2022-01-01 NOTE — PROGRESS NOTES
[facility]  Office: [address], DO, Sergiocharli Brown, DO, Freddie Starks, DO, To Lopezaleida Freedman, DO, Bernadene Ahumada, MD, Aj Calix MD, Larry Hsu MD, Yasmin Benitez MD, Jojo Delacruz MD, Kuldip Jensen MD, Alysa Hull MD, Melyssa Kaminski, DO, Cruz Lin, DO, Herlinda Mack MD,  Ina Davison DO, Silverio Morgan MD, Carlos Fletcher MD, Sally Coburn MD, Riya Georges MD, Bryce Garcia MD, Christina Carmichael MD, Peter Luo MD, Serge Walter [facility], [facility], CNP, Flower Hernandez, CNP, Michael Pacheco, CNS, Diego Martinez, CNP, Luca Morales, CNP, Twylla Boeck, CNP, Jammie Mena, CNP, Zeus Burgos, CNP, Joann Villegas PA-C, Benja Reddy, DNP, Madhu Chauhan, DNP, Inez Chapman, CNP, Misa Jensen, CNP, Cy De La Cruz, CNP, Mimi Mckeon, CNP, Kye Jaffe, [facility], Rob Westfall [facility]    Progress Note    1/1/2022    9:31 AM    Name:   Alden Art  MRN:     [id number]     Acct:      [de-identified]   Room:   [address] [place] [address] Day:  1  Admit Date:  12/31/2021  6:34 PM    PCP:   Nancy Li MD  Code Status:  Full Code    Subjective:     C/C:   Chief Complaint   Patient presents with    Chest Pain     seen at [facility] and was told to come to the ED because her EKG was abnormal, Covid negative    Shortness of Breath      Interval History Status: not changed. Pt was seen and examiend this morning   States that she feels slightly better but conitnues to feel sob with mild chest discomfort and cough   No other complaints at this time   No acute events overnight       Review of Systems:     12 point ROS performed and negative for anything other than what was stated in subjective     Medications: Allergies:     Allergies   Allergen Reactions    Nsaids     Hydrocodone-Acetaminophen Nausea Only    Oxycodone-Acetaminophen Nausea Only    Sulfamethoxazole-Trimethoprim Hives and Rash       Current Meds:   Scheduled Meds:    pantoprazole  40 mg Oral QAM AC    sennosides-docusate sodium  2 tablet Oral Daily    sodium chloride flush  5-40 mL IntraVENous 2 times per day    [START ON 2022] azithromycin  500 mg IntraVENous Q24H    cefTRIAXone (ROCEPHIN) IV  1,000 mg IntraVENous Q24H     Continuous Infusions:    heparin (PORCINE) Infusion 18 Units/kg/hr (22 6271)    sodium chloride       PRN Meds: heparin (porcine), heparin (porcine), sodium chloride flush, sodium chloride, ondansetron **OR** ondansetron, magnesium hydroxide, acetaminophen **OR** acetaminophen, sodium chloride flush    Data:     Past Medical History:   has a past medical history of Abnormal findings on esophagogastroduodenoscopy (EGD), Arthritis, Chronic constipation, Gastritis, GERD (gastroesophageal reflux disease), H. pylori infection, Headache(784.0), Hiatal hernia, Prolonged emergence from general anesthesia, and Sleep apnea. Social History:   reports that she quit smoking about 9 years ago. She has never used smokeless tobacco. She reports previous alcohol use. She reports that she does not use drugs. Family History:   Family History   Problem Relation Age of Onset    Cancer Mother 64        ovarian cancer     No Known Problems Father        Vitals:  /82   Pulse 86   Temp 100.2 °F (37.9 °C) (Oral)   Resp 16   Ht 5' 6\" (1.676 m)   Wt 176 lb 6 oz (80 kg)   SpO2 94%   BMI 28.47 kg/m²   Temp (24hrs), Av.3 °F (37.4 °C), Min:97.5 °F (36.4 °C), Max:100.2 °F (37.9 °C)    No results for input(s): POCGLU in the last 72 hours. I/O (24Hr):     Intake/Output Summary (Last 24 hours) at 2022 0931  Last data filed at 2022 0643  Gross per 24 hour   Intake 390.45 ml   Output --   Net 390.45 ml       Labs:  Hematology:  Recent Labs     21  1946 22  0530   WBC 5.9 7.0   RBC 4.81 4.68   HGB 13.2 12.8   HCT 41.1 40.1   MCV 85.4 85.7   MCH 27.4 27.4   MCHC 32.1 31.9   RDW 12.9 12.9    251   MPV 9.1 9.2   INR 1.0 1.0   DDIMER 0.57  --      Chemistry:  Recent Labs     12/31/21 1948 12/31/21 1948 12/31/21 2128 12/31/21 2321 01/01/22  0530     --   --   --  142   K 3.7  --   --   --  3.9     --   --   --  107   CO2 25  --   --   --  24   GLUCOSE 95  --   --   --  86   BUN 5*  --   --   --  8   CREATININE 0.60  --   --   --  0.75   ANIONGAP 10  --   --   --  11   LABGLOM >60  --   --   --  >60   GFRAA >60  --   --   --  >60   CALCIUM 8.9  --   --   --  8.7   TROPHS <6   < > <6 <6 6   MYOGLOBIN <21*  --  <21* 23*  --     < > = values in this interval not displayed. Recent Labs     12/31/21 1948 01/01/22  0530   PROT 7.0 6.4   LABALBU 4.1 3.9   AST 21 21   ALT 15 14   LDH  --  154   ALKPHOS 76 81   BILITOT 0.38 0.32     ABG:No results found for: POCPH, PHART, PH, POCPCO2, PLL4ZQC, PCO2, POCPO2, PO2ART, PO2, POCHCO3, EHC7QWV, HCO3, NBEA, PBEA, BEART, BE, THGBART, THB, GGF5FVG, YCEC4DDD, Y8IAJABJ, O2SAT, FIO2  Lab Results   Component Value Date/Time    SPECIAL NOT REPORTED 05/28/2015 09:39 AM     No results found for: CULTURE    Radiology:  CT CHEST PULMONARY EMBOLISM W CONTRAST    Result Date: 12/31/2021  1. Small emboli within right upper and left upper lobe segmental branches of the pulmonary arteries, without evidence of RV strain 2. Patchy airspace disease present bilaterally, most prominent within the left lower lobe, suggesting pneumonia and or atelectasis 3. Trace bilateral pleural effusions 4. Critical results were called by Dr. Cuauhtemoc Marie to CHAIM Bryan on 12/31/2021 at 9:44 p.m. hours.  RECOMMENDATIONS: Unavailable       Physical Examination:        General appearance:  alert, cooperative and no distress  Mental Status:  oriented to person, place and time and normal affect  Lungs:  Decreased breath sounds bilaterally   Heart:  regular rate and rhythm, no murmur  Abdomen:  soft, nontender, nondistended, normal bowel sounds   Extremities:  no edema, redness, tenderness in the calves  Skin:  no gross lesions, rashes, induration    Assessment:        Hospital Problems           Last Modified POA    * (Principal) Pulmonary embolism without acute cor pulmonale ([address] 12/31/2021 Yes    Hx of bariatric surgery 12/31/2021 Yes          Plan:        - pt has now had two negative COVID tests done, one PCR at [facility] and one rapid at [facility]  - CTA chest positive for small emboli within RUL/COLLIN segmental branches of pulmonary arteries   - also noted to showed patchy airspace disease possibly suggesting pneumonia   - continue IV heparin drip with plans to change to [facility] on d/c   - currently has an elevated temperature of 100.2 but saturating well on room air   - pt states that she had 8 members of her family test positive for covid and she had spent wes with all 8  - continue rocephin and zithromax at this time   - wbc count normal   - check ECHO to rule out septic emboli   - check blood cx   - rule out other potential sources of emboli   - noted hx of bariatric surgery          Romana Begin, MD  1/1/2022  9:31 AM

## 2022-01-01 NOTE — PROGRESS NOTES
Patient admitted to room 1012 from ED. Vital taken, telemetry placed. Patient alert and oriented. All belongings with patient.

## 2022-01-01 NOTE — CARE COORDINATION
Case Management Initial Discharge Plan  Rosa Carranza,             Met with:patient to discuss discharge plans. Information verified: address, contacts, phone number, , insurance Yes  Insurance Provider: [employer]    Emergency Contact/Next of Kin name & number: spouse/Alonso   [phone]  Who are involved in patient's support system? spouse    PCP: Edyta Carlson MD  Date of last visit: 2021      Discharge Planning    Living Arrangements:  Spouse/Significant Doug Pompa has 1 stories  2 stairs to climb to get into front door, 0stairs to climb to reach second floor  Location of bedroom/bathroom in home main    Patient able to perform ADL's:Independent    Current Services (outpatient & in home) none  DME equipment: 0  DME provider: 0    Is patient receiving oral anticoagulation therapy? No    If indicated:   Physician managing anticoagulation treatment:   Where does patient obtain lab work for ATC treatment? Potential Assistance Needed:  N/A    Patient agreeable to home care: No  [place] of choice provided:  n/a    Prior SNF/Rehab Placement and Facility: n/a  Agreeable to SNF/Rehab: No  [place] of choice provided: n/a     Evaluation: no    Expected Discharge date:  22    Patient expects to be discharged to: If home: is the family and/or caregiver wiling & able to provide support at home? yes  Who will be providing this support? Self and spouse    Follow Up Appointment: Best Day/ Time: Monday AM    Transportation provider: spouse  Transportation arrangements needed for discharge: No    Readmission Risk              Risk of Unplanned Readmission:  9             Does patient have a readmission risk score greater than 14?: No  If yes, follow-up appointment must be made within 7 days of discharge.      Goals of Care:       Educated patient on transitional options, provided freedom of choice and are agreeable with plan      Discharge Plan: PE- bilateral  Patient lives with spouse in a 1 story home with 2 steps to enter. Declines any skilled needs. Independent  No DME's  Pharmacy is [facility]   PT/OT to eval.   Currently on a heparin gtt. Watch for [facility] upon d/c   Continue to follow.            Electronically signed by Flori Wise RN on 1/1/22 at 3:36 PM EST

## 2022-01-01 NOTE — ED NOTES
Patient presents to the er with c/o chest discomfort, verbalizing pain started yesterday has been intermittent. Patient verbalizing when pain comes on it wraps around and squeezes. Patient verbalizing had recent bariatric surgery in November.       Dena Asencio RN  12/31/21 3185

## 2022-01-01 NOTE — ED PROVIDER NOTES
eMERGENCY dEPARTMENT eNCOUnter   [address] Name: Benja Richardson  MRN: [id number]  [place] 1970  Date of evaluation: 12/31/21     Benja Richardson is a 54 y.o. female with CC: Chest Pain (seen at [facility] and was told to come to the ED because her EKG was abnormal, Covid negative) and Shortness of Breath       EKG sinus rhythm rate 78  No ST or T wave changes    QTc 424  Unremarkable EKG    Based on the medical record the care appears appropriate. I was personally available for consultation in the Emergency Department. The care is provided during an unprecedented national emergency due to the novel coronavirus, COVID 19.     Otis Joel MD  Attending Emergency Physician                 Vidhya Kennedy MD  12/31/21 2051

## 2022-01-01 NOTE — H&P
[facility]  Office: [address], DO, Sergiocharli Brown, DO, [place] Tereza, DO, To Lopezaleida Freedman, DO, Bernadene Ahumada, MD, Aj Calix MD, Larry Hsu MD, Yasmin Benitez MD, Jojo Delacruz MD, Kuldip Jensen MD, Alysa Hull MD, Melyssa Kaminski DO, Cruz iLn DO, Herlinda Mack MD,  Ina Davison DO, Silverio Morgan MD, Carlos Fletcher MD, Sally Coburn MD, Riya Georges MD, Bryce Garcia MD, Christina Carmichael MD, Peter Luo MD, Serge Walter CNP, [facility], CNP, Flower Hernandez, CNP, Michael Pacheco, [facility], Diego Martinez, CNP, Luca Morales, CNP, Twylla Boeck, CNP, Jammie Mena, CNP, Zeus Burgos, CNP, Joann Villegas PA-C, Benja Reddy, DNP, Madhu Chauhan, DNP, Inez Chapman, CNP, Misa Jensen, CNP, Cy De La Cruz, CNP, Mimi Mckeon, CNP, Kye Jaffe, CNP, Rashid Coon, CNP         [facility]   [address] [facility] [address]    HISTORY AND PHYSICAL EXAMINATION            Date:   12/31/2021  Patient name:  Alden Art  Date of admission:  12/31/2021  6:34 PM  MRN:   [id number]  Account:  [de-identified]  YOB: 1970  PCP:    Nancy Li MD  Room:   [place] [address]  Code Status:    No Order    Chief Complaint:     Chief Complaint   Patient presents with    Chest Pain     seen at [facility] and was told to come to the ED because her EKG was abnormal, Covid negative    Shortness of Breath     History Obtained From:     Patient and electronic medical record. History of Present Illness:     Alden Art is a 54 y.o.  /  female who presents with Chest Pain (seen at [facility] and was told to come to the ED because her EKG was abnormal, Covid negative) and Shortness of Breath   and is admitted to the hospital for the management of <principal problem not specified>. The patient reports to the hospital with complaint of shortness of breath and chest tightness.  She states her symptoms started yesterday and progressively worsened. She endorses midsternal chest heaviness that she describes as constant, dull and severe. She states it worsens when she coughs and that it at times feels like a squeezing sensation. She went to urgent care, as she was concerned she had COVID-19. She had a negative COVID-19 test there, but was noted to have EKG changes. She was advised to come to the emergency department. She was subsequently found to have pneumonia and bilateral pulmonary embolisms on CT scan. A second COVID-19 test conducted by our ER was negative. Of note, the patient recently underwent an extensive abdominal surgery on November 4, 2021 consisting of a paniculectomy and abdominoplasty. She denies fever, chills, nausea or vomiting. No previous history of DVT. She is has received x2 COVID-19 vaccinations. She has past medical history that includes gastric bypass surgery, GERD and EDDIE. [facility]: POCT SARS COV 2 veritor negative. Rapid SARS-CoV-2 conducted in our emergency department negative. High sensitivity troponin <6, <6. WBC 5.9, d dimer 0.57. CT chest with contrast shows: 1. Small emboli within right upper and left upper lobe segmental branches of the pulmonary arteries, without evidence of RV strain 2. Patchy airspace disease present bilaterally, most prominent within the left lower lobe, suggesting pneumonia and or atelectasis 3. Trace bilateral pleural effusions       Past Medical History:     Past Medical History:   Diagnosis Date    Abnormal findings on esophagogastroduodenoscopy (EGD) 06/12/2020    Gastritis, tiny hiatal hernia, candy cane deformity of gastrojejunostomy    Arthritis     Chronic constipation     Gastritis     GERD (gastroesophageal reflux disease)     H. pylori infection     Headache(784.0)     Hiatal hernia     Tiny per EGD 6-12-20    Prolonged emergence from general anesthesia     Sleep apnea     Pt states has resolved since gastric bypass surgery.          Past Surgical History:     Past Surgical History:   Procedure Laterality Date    ABDOMINOPLASTY N/A 11/4/2021    ABDOMINOPLASTY WITH BIOPATCH AROUND PANTERA DRAIN AND INCISIONAL WOUND VAC WITH PRE OP TAP BLOCK performed by Grabiel De La O MD at [facility]  11/1991    COLONOSCOPY      COLONOSCOPY N/A 3/10/2021    COLONOSCOPY DIAGNOSTIC performed by Steffen Obregon MD at [place] [address]  11/04/2021    PANNICULECTOMY (N/A Abdomen)     COSMETIC SURGERY  11/04/2021    ABDOMINOPLASTY WITH BIOPATCH AROUND PANTERA DRAIN AND INCISIONAL WOUND VAC     ENDOSCOPY, COLON, DIAGNOSTIC  06/12/2020    GASTRIC BYPASS SURGERY  2016    Roberto-en-Y    HYSTERECTOMY      LAPAROSCOPY      LEEP      LIPECTOMY N/A 11/4/2021    PANNICULECTOMY performed by Grabiel De La O MD at [facility]          Medications Prior to Admission:     Prior to Admission medications    Medication Sig Start Date End Date Taking? Authorizing Provider   pantoprazole (PROTONIX) 40 MG tablet Take 1 tablet by mouth every morning (before breakfast) 9/14/21  Yes Steffen Obregon MD   ondansetron (ZOFRAN-ODT) 4 MG disintegrating tablet Place 1 tablet under the tongue every 8 hours as needed for Nausea or Vomiting 6/16/21  Yes Milagros Hobbs MD   DULoxetine (CYMBALTA) 20 MG extended release capsule Take 1 capsule by mouth daily 6/16/21  Yes Milagros Hobbs MD   baclofen (LIORESAL) 10 MG tablet Take 1 tablet by mouth 3 times daily 11/4/21   Grabiel De La O MD   gabapentin (NEURONTIN) 300 MG capsule Take 1 capsule by mouth 3 times daily for 20 days. 11/4/21 11/24/21  Grabiel De La O MD   cephALEXin (KEFLEX) 500 MG capsule Take 1 capsule by mouth 4 times daily 11/4/21   Grabiel De La O MD   nystatin (MYCOSTATIN) 562904 UNIT/GM powder Apply 3 times daily.  6/16/21   Milagros Hobbs MD   Calcium-Vitamin D-Vitamin K (CALCIUM SOFT CHEWS PO) Take by mouth 3 times daily    Historical Provider, MD   Multiple Vitamins-Minerals (THERAPEUTIC MULTIVITAMIN-MINERALS) tablet Take 1 tablet by mouth daily    Historical Provider, MD   senna-docusate (Zannie Force) 8.6-50 MG per tablet Take 2 tablets by mouth daily 2/10/21   David Hassan MD        Allergies:     Nsaids, Hydrocodone-acetaminophen, Oxycodone-acetaminophen, and Sulfamethoxazole-trimethoprim    Social History:     Tobacco:    reports that she quit smoking about 9 years ago. She has never used smokeless tobacco.  Alcohol:      reports previous alcohol use. Drug Use:  reports no history of drug use. Family History:     Family History   Problem Relation Age of Onset    Cancer Mother 64        ovarian cancer     No Known Problems Father        Review of Systems:     Positive and Negative as described in HPI. Review of Systems   Constitutional: Negative for chills, diaphoresis and fever. HENT: Negative for congestion. Eyes: Negative for visual disturbance. Respiratory: Positive for cough, chest tightness and shortness of breath. Cardiovascular: Negative for palpitations and leg swelling. Gastrointestinal: Negative for abdominal pain, blood in stool, constipation, diarrhea, nausea and vomiting. Endocrine: Negative for cold intolerance and heat intolerance. Genitourinary: Negative for difficulty urinating, dysuria, frequency and urgency. Musculoskeletal: Negative for arthralgias and myalgias. Skin: Negative for color change and rash. Neurological: Negative for dizziness, weakness, light-headedness, numbness and headaches. Hematological: Does not bruise/bleed easily. Psychiatric/Behavioral: The patient is not nervous/anxious. All other systems reviewed and are negative.       Physical Exam:   /82   Pulse 90   Temp 97.5 °F (36.4 °C) (Oral)   Resp 21   Ht 5' 6\" (1.676 m)   Wt 166 lb (75.3 kg)   SpO2 98%   BMI 26.79 kg/m²   Temp (24hrs), Av.5 °F (36.4 °C), Min:97.5 °F (36.4 °C), Max:97.5 °F (36.4 °C)    No results for input(s): POCGLU in the last 72 hours. No intake or output data in the 24 hours ending 12/31/21 3896    Physical Exam  Vitals and nursing note reviewed. Constitutional:       Appearance: She is not diaphoretic. HENT:      Head: Normocephalic and atraumatic. Right Ear: Hearing normal.      Left Ear: Hearing normal.      Nose: Nose normal. No rhinorrhea. Eyes:      General: Lids are normal.      Extraocular Movements:      Right eye: Normal extraocular motion. Left eye: Normal extraocular motion. Conjunctiva/sclera: Conjunctivae normal.      Right eye: Right conjunctiva is not injected. Left eye: Left conjunctiva is not injected. Pupils: Pupils are equal, round, and reactive to light. Pupils are equal.      Right eye: Pupil is reactive. Left eye: Pupil is reactive. Neck:      Thyroid: No thyromegaly. Trachea: Trachea normal. No tracheal deviation. Cardiovascular:      Rate and Rhythm: Normal rate and regular rhythm. Pulses: Normal pulses. Heart sounds: Normal heart sounds. Pulmonary:      Effort: Pulmonary effort is normal.      Breath sounds: Examination of the right-lower field reveals decreased breath sounds. Examination of the left-lower field reveals decreased breath sounds. Decreased breath sounds present. Abdominal:      General: Bowel sounds are normal. There is no distension. Palpations: Abdomen is soft. There is no mass. Tenderness: There is no abdominal tenderness. There is no guarding. Comments: Abdominal binder    Musculoskeletal:         General: No tenderness. Cervical back: Neck supple. Skin:     General: Skin is warm and dry. Neurological:      Mental Status: She is alert and oriented to person, place, and time. She is not disoriented. Cranial Nerves: No cranial nerve deficit. Psychiatric:         Speech: Speech normal.         Behavior: Behavior normal. Behavior is cooperative.          Investigations: Laboratory Testing:  Recent Results (from the past 24 hour(s))   CBC Auto Differential    Collection Time: 12/31/21  7:48 PM   Result Value Ref Range    WBC 5.9 3.5 - 11.3 k/uL    RBC 4.81 3.95 - 5.11 m/uL    Hemoglobin 13.2 11.9 - 15.1 g/dL    Hematocrit 41.1 36.3 - 47.1 %    MCV 85.4 82.6 - 102.9 fL    MCH 27.4 25.2 - 33.5 pg    MCHC 32.1 28.4 - 34.8 g/dL    RDW 12.9 11.8 - 14.4 %    Platelets 930 436 - 783 k/uL    MPV 9.1 8.1 - 13.5 fL    NRBC Automated 0.0 0.0 per 100 WBC    Differential Type NOT REPORTED     Seg Neutrophils 68 (H) 36 - 65 %    Lymphocytes 19 (L) 24 - 43 %    Monocytes 7 3 - 12 %    Eosinophils % 5 (H) 1 - 4 %    Basophils 1 0 - 2 %    Immature Granulocytes 0 0 %    Segs Absolute 3.96 1.50 - 8.10 k/uL    Absolute Lymph # 1.14 1.10 - 3.70 k/uL    Absolute Mono # 0.43 0.10 - 1.20 k/uL    Absolute Eos # 0.32 0.00 - 0.44 k/uL    Basophils Absolute 0.04 0.00 - 0.20 k/uL    Absolute Immature Granulocyte 0.01 0.00 - 0.30 k/uL    WBC Morphology NOT REPORTED     RBC Morphology NOT REPORTED     Platelet Estimate NOT REPORTED    TROP/MYOGLOBIN    Collection Time: 12/31/21  7:48 PM   Result Value Ref Range    Troponin, High Sensitivity <6 0 - 14 ng/L    Troponin T NOT REPORTED <0.03 ng/mL    Troponin Interp NOT REPORTED     Myoglobin <21 (L) 25 - 58 ng/mL   D-Dimer, Quantitative    Collection Time: 12/31/21  7:48 PM   Result Value Ref Range    D-Dimer, Quant 0.57 0.00 - 0.59 mg/L FEU   Comprehensive Metabolic Panel    Collection Time: 12/31/21  7:48 PM   Result Value Ref Range    Glucose 95 70 - 99 mg/dL    BUN 5 (L) 6 - 20 mg/dL    CREATININE 0.60 0.50 - 0.90 mg/dL    Bun/Cre Ratio 8 (L) 9 - 20    Calcium 8.9 8.6 - 10.4 mg/dL    Sodium 140 135 - 144 mmol/L    Potassium 3.7 3.7 - 5.3 mmol/L    Chloride 105 98 - 107 mmol/L    CO2 25 20 - 31 mmol/L    Anion Gap 10 9 - 17 mmol/L    Alkaline Phosphatase 76 35 - 104 U/L    ALT 15 5 - 33 U/L    AST 21 <32 U/L    Total Bilirubin 0.38 0.3 - 1.2 mg/dL    Total Protein 7.0 6.4 - 8.3 g/dL    Albumin 4.1 3.5 - 5.2 g/dL    Albumin/Globulin Ratio NOT REPORTED 1.0 - 2.5    GFR Non-African American >60 >60 mL/min    GFR African American >60 >60 mL/min    GFR Comment          GFR Staging NOT REPORTED    Protime-INR    Collection Time: 12/31/21  7:48 PM   Result Value Ref Range    Protime 13.0 11.5 - 14.2 sec    INR 1.0    APTT    Collection Time: 12/31/21  7:48 PM   Result Value Ref Range    PTT 32.3 23.9 - 33.8 sec   TROP/MYOGLOBIN    Collection Time: 12/31/21  9:28 PM   Result Value Ref Range    Troponin, High Sensitivity <6 0 - 14 ng/L    Troponin T NOT REPORTED <0.03 ng/mL    Troponin Interp NOT REPORTED     Myoglobin <21 (L) 25 - 58 ng/mL   COVID-19, Rapid    Collection Time: 12/31/21 10:19 PM    Specimen: Nasopharyngeal Swab   Result Value Ref Range    Specimen Description . NASOPHARYNGEAL SWAB     SARS-CoV-2, Rapid Not Detected Not Detected       Imaging/Diagnostics:  CT CHEST PULMONARY EMBOLISM W CONTRAST    Result Date: 12/31/2021  1. Small emboli within right upper and left upper lobe segmental branches of the pulmonary arteries, without evidence of RV strain 2. Patchy airspace disease present bilaterally, most prominent within the left lower lobe, suggesting pneumonia and or atelectasis 3. Trace bilateral pleural effusions 4. Critical results were called by Dr. Beau Galaviz to CHAIM Alvarado on 12/31/2021 at 9:44 p.m. hours. RECOMMENDATIONS: Unavailable     Assessment :      Hospital Problems           Last Modified POA    * (Principal) Pulmonary embolism without acute cor pulmonale ([address] 12/31/2021 Yes    Hx of bariatric surgery 12/31/2021 Yes        Plan:     Patient status inpatient in the  Progressive Unit/Step down    1. x2 negative COVID-19 tests, patient is fully vaccinated. Low suspicion for COVID-19, discontinue droplet plus precautions. 2. PE- high intensity heparin drip. Will require bridging to oral anticoagulation.    3. IV rocephin and azithromycin for possible pneumonia, check procalcitonin. If negative consider discontinuing antimicrobial therapy. 4. Incentive spirometry. 5. Supplemental oxygen as needed. 6. Recheck EKG in AM. Doubt ACS. 7. Echocardiogram.   8. Telemetry. 9. Monitor vital signs. 10. Follow chemistries. 11. General diet. 12. Activity as tolerated with assist.     Plan of care discussed in depth with patient. Consultations:   IP CONSULT TO HOSPITALIST    Patient is admitted as inpatient status because of co-morbidities listed above, severity of signs and symptoms as outlined, requirement for current medical therapies and most importantly because of direct risk to patient if care not provided in a hospital setting. Expected length of stay > 48 hours.     Lesley Inman, GRICELDA - MAULIK  12/31/2021  10:58 PM    Copy sent to Dr. Fermin Scales MD

## 2022-01-01 NOTE — ED PROVIDER NOTES
[address] [place] [address] ED  [facility]      Pt Name: Wes Pitts  MRN: [id number]  [place] 1970  Date ofevaluation: 12/31/2021  Provider: Kyaw Mcgrath Dr       Chief Complaint   Patient presents with    Chest Pain     seen at [facility] and was told to come to the ED because her EKG was abnormal, Covid negative    Shortness of Breath         HISTORY OF PRESENT ILLNESS  (Location/Symptom, Timing/Onset, Context/Setting, Quality, Duration, Modifying Factors, Severity.)   Wes Pitts is a 54 y.o. female who presents to the emergency department with chest pain shortness of breath along with cough over the last 2 to 3 days. Reports abdominal plasty roughly a month ago. Went to urgent care due to Covid concerns. Multiple family members have tested positive for Covid since Christmas. Patient received a Covid swab but she tells me was negative an EKG which she took was abnormal and directed to the ER. Does report pleuritic chest pain. Nursing Notes were reviewed. ALLERGIES     Nsaids, Hydrocodone-acetaminophen, Oxycodone-acetaminophen, and Sulfamethoxazole-trimethoprim    CURRENT MEDICATIONS       Previous Medications    BACLOFEN (LIORESAL) 10 MG TABLET    Take 1 tablet by mouth 3 times daily    CALCIUM-VITAMIN D-VITAMIN K (CALCIUM SOFT CHEWS PO)    Take by mouth 3 times daily    CEPHALEXIN (KEFLEX) 500 MG CAPSULE    Take 1 capsule by mouth 4 times daily    DULOXETINE (CYMBALTA) 20 MG EXTENDED RELEASE CAPSULE    Take 1 capsule by mouth daily    GABAPENTIN (NEURONTIN) 300 MG CAPSULE    Take 1 capsule by mouth 3 times daily for 20 days. MULTIPLE VITAMINS-MINERALS (THERAPEUTIC MULTIVITAMIN-MINERALS) TABLET    Take 1 tablet by mouth daily    NYSTATIN (MYCOSTATIN) 053237 UNIT/GM POWDER    Apply 3 times daily.     ONDANSETRON (ZOFRAN-ODT) 4 MG DISINTEGRATING TABLET    Place 1 tablet under the tongue every 8 hours as needed for Nausea or Vomiting    PANTOPRAZOLE (PROTONIX) 40 MG TABLET    Take 1 tablet by mouth every morning (before breakfast)    SENNA-DOCUSATE (PERICOLACE) 8.6-50 MG PER TABLET    Take 2 tablets by mouth daily       PAST MEDICAL HISTORY         Diagnosis Date    Abnormal findings on esophagogastroduodenoscopy (EGD) 2020    Gastritis, tiny hiatal hernia, candy cane deformity of gastrojejunostomy    Arthritis     Chronic constipation     Gastritis     GERD (gastroesophageal reflux disease)     H. pylori infection     Headache(784.0)     Hiatal hernia     Tiny per EGD 20    Prolonged emergence from general anesthesia     Sleep apnea     Pt states has resolved since gastric bypass surgery. SURGICAL HISTORY           Procedure Laterality Date    ABDOMINOPLASTY N/A 2021    ABDOMINOPLASTY WITH BIOPATCH AROUND PANTERA DRAIN AND INCISIONAL WOUND VAC WITH PRE OP TAP BLOCK performed by Tl Fu MD at [address] [place] [address]  1991    COLONOSCOPY      COLONOSCOPY N/A 3/10/2021    COLONOSCOPY DIAGNOSTIC performed by Sherly Gaspar MD at [place] [address]  2021    PANNICULECTOMY (N/A Abdomen)     COSMETIC SURGERY  2021    ABDOMINOPLASTY WITH BIOPATCH AROUND PANTERA DRAIN AND INCISIONAL WOUND VAC     ENDOSCOPY, COLON, DIAGNOSTIC  2020    GASTRIC BYPASS SURGERY  2016    Roberto-en-Y    HYSTERECTOMY      LAPAROSCOPY      LEEP      LIPECTOMY N/A 2021    PANNICULECTOMY performed by Tl Fu MD at [facility] 27 TUBAL LIGATION           FAMILY HISTORY           Problem Relation Age of Onset    Cancer Mother 64        ovarian cancer     No Known Problems Father      Family Status   Relation Name Status    Mother      Father  (Not Specified)        SOCIAL HISTORY      reports that she quit smoking about 9 years ago. She has never used smokeless tobacco. She reports previous alcohol use.  She reports that she does not use drugs. REVIEW OFSYSTEMS    (2-9 systems for level 4, 10 or more for level 5)   Review of Systems    Except as noted above the remainder of the review of systems was reviewed and negative. PHYSICAL EXAM    (up to 7 for level 4, 8 or more for level 5)     ED Triage Vitals [12/31/21 2050]   BP Temp Temp Source Pulse Resp SpO2 Height Weight   124/88 97.5 °F (36.4 °C) Oral 76 16 97 % 5' 6\" (1.676 m) 166 lb (75.3 kg)      Physical Exam  Constitutional:       Appearance: She is well-developed. HENT:      Head: Normocephalic and atraumatic. Cardiovascular:      Rate and Rhythm: Normal rate and regular rhythm. Pulmonary:      Effort: Pulmonary effort is normal.      Breath sounds: Normal breath sounds. Abdominal:      Palpations: Abdomen is soft. Musculoskeletal:         General: Normal range of motion. Cervical back: Normal range of motion and neck supple. Skin:     General: Skin is warm. Findings: No rash. Neurological:      Mental Status: She is alert and oriented to person, place, and time. Psychiatric:         Behavior: Behavior normal.                 DIAGNOSTIC RESULTS     EKG: All EKG's are interpreted by the Emergency Department Physician who either signs or Co-signs this chart in the absence of a cardiologist.    No acute findings. Nsr.  No st segment elevation    RADIOLOGY:   Non-plain film images such as CT, Ultrasound and MRI are read by the radiologist. Plain radiographic images arevisualized and preliminarily interpreted by the emergency physician with the below findings:        Interpretation per the Radiologist below, if available at thetime of this note:          ED BEDSIDE ULTRASOUND:   Performed by ED Physician - none    LABS:  Labs Reviewed   CBC WITH AUTO DIFFERENTIAL - Abnormal; Notable for the following components:       Result Value    Seg Neutrophils 68 (*)     Lymphocytes 19 (*)     Eosinophils % 5 (*)     All other components within normal limits TROP/MYOGLOBIN - Abnormal; Notable for the following components:    Myoglobin <21 (*)     All other components within normal limits   TROP/MYOGLOBIN - Abnormal; Notable for the following components:    Myoglobin <21 (*)     All other components within normal limits   COMPREHENSIVE METABOLIC PANEL - Abnormal; Notable for the following components:    BUN 5 (*)     Bun/Cre Ratio 8 (*)     All other components within normal limits   COVID-19, RAPID   D-DIMER, QUANTITATIVE   PROTIME-INR   APTT   TROP/MYOGLOBIN   HEPARIN LEVEL/ANTI-XA   HEPARIN LEVEL/ANTI-XA       All other labs were within normal range or not returned as of this dictation. EMERGENCY DEPARTMENT COURSE and DIFFERENTIAL DIAGNOSIS/MDM:   Vitals:    Vitals:    12/31/21 2122 12/31/21 2123 12/31/21 2200 12/31/21 2232   BP: (!) 124/94  122/82    Pulse: 85 85 75 90   Resp: 16 14 21 21   Temp:       TempSrc:       SpO2: 97% 100%  98%   Weight:       Height:           10:48 PM EST  Will admit. Case discussed with Jamie Parkinson NP from [facility] and admission was accepted. Patient placed on heparin. Will admit. Questionable pneumonia on Chest CT. Repeat covid negative. Covered with antibiotics. CONSULTS:  IP CONSULT TO HOSPITALIST    PROCEDURES:  Procedures    CRITICAL CARE TIME     Due to the high probability of sudden and clinically significant deterioration in the patient's condition she required highest level of my preparedness to intervene urgently. I provided critical care time including documentation time, medication orders and management, reevaluation, vital sign assessment, ordering and reviewing of of lab tests ordering and reviewing of x-ray studies, and admission orders. Aggregate critical care time is between 35  minutes including only time during which I was engaged in work directly related to her care and did not include time spent treating other patients simultaneously. FINAL IMPRESSION      1.  Pulmonary embolism without acute cor pulmonale, unspecified chronicity, unspecified pulmonary embolism type [facility])          DISPOSITION/PLAN   DISPOSITION Admitted 12/31/2021 10:49:40 PM      PATIENTREFERRED TO:   No follow-up provider specified.     DISCHARGE MEDICATIONS:     New Prescriptions    No medications on file           (Please note that portions of this note were completed with a voice recognition program.  Efforts were made to edit thedictations but occasionally words are mis-transcribed.)    ENMANUEL Hensley PA-C  12/31/21 1295

## 2022-01-01 NOTE — PROGRESS NOTES
Physical Therapy  DATE: 2022    NAME: Alin Moody  MRN: [id number]   : 1970    Patient not seen this date for Physical Therapy due to:      [] Cancel by RN or physician due to:    [] Hemodialysis    [] Critical Lab Value Level     [] Blood transfusion in progress    [] Acute or unstable cardiovascular status   _MAP < 55 or more than >115  _HR < 40 or > 130    [] Acute or unstable pulmonary status   -FiO2 > 60%   _RR < 5 or >40    _O2 sats < 85%    [] Strict Bedrest    [] Off Unit for surgery or procedure    [] Off Unit for testing       [] Pending imaging to R/O fracture    [] Refusal by Patient      [x] Other  NEW PE DX - will need re-start activity orders once ok to resume. [] PT being discontinued at this time. Patient independent. No further needs. [] PT being discontinued at this time as the patient has been transferred to hospice care. No further needs.       Lizz Butler, PT

## 2022-01-02 PROBLEM — E55.9 VITAMIN D DEFICIENCY: Status: ACTIVE | Noted: 2022-01-02

## 2022-01-02 PROBLEM — J18.9 PNEUMONIA DUE TO INFECTIOUS ORGANISM: Status: ACTIVE | Noted: 2022-01-02

## 2022-01-02 LAB
ANTI-XA UNFRAC HEPARIN: 0.51 IU/L (ref 0.3–0.7)
HCT VFR BLD CALC: 40.8 % (ref 36.3–47.1)
HEMOGLOBIN: 13.3 G/DL (ref 11.9–15.1)
MCH RBC QN AUTO: 27.8 PG (ref 25.2–33.5)
MCHC RBC AUTO-ENTMCNC: 32.6 G/DL (ref 28.4–34.8)
MCV RBC AUTO: 85.4 FL (ref 82.6–102.9)
NRBC AUTOMATED: 0 PER 100 WBC
PDW BLD-RTO: 13 % (ref 11.8–14.4)
PLATELET # BLD: 235 K/UL (ref 138–453)
PMV BLD AUTO: 9.5 FL (ref 8.1–13.5)
RBC # BLD: 4.78 M/UL (ref 3.95–5.11)
WBC # BLD: 5.1 K/UL (ref 3.5–11.3)

## 2022-01-02 PROCEDURE — 6370000000 HC RX 637 (ALT 250 FOR IP): Performed by: NURSE PRACTITIONER

## 2022-01-02 PROCEDURE — 2580000003 HC RX 258: Performed by: NURSE PRACTITIONER

## 2022-01-02 PROCEDURE — 97530 THERAPEUTIC ACTIVITIES: CPT

## 2022-01-02 PROCEDURE — 36415 COLL VENOUS BLD VENIPUNCTURE: CPT

## 2022-01-02 PROCEDURE — 97116 GAIT TRAINING THERAPY: CPT

## 2022-01-02 PROCEDURE — 0202U NFCT DS 22 TRGT SARS-COV-2: CPT

## 2022-01-02 PROCEDURE — 97161 PT EVAL LOW COMPLEX 20 MIN: CPT

## 2022-01-02 PROCEDURE — 6360000002 HC RX W HCPCS: Performed by: NURSE PRACTITIONER

## 2022-01-02 PROCEDURE — 99232 SBSQ HOSP IP/OBS MODERATE 35: CPT | Performed by: NURSE PRACTITIONER

## 2022-01-02 PROCEDURE — 2060000000 HC ICU INTERMEDIATE R&B

## 2022-01-02 PROCEDURE — 85027 COMPLETE CBC AUTOMATED: CPT

## 2022-01-02 PROCEDURE — 85520 HEPARIN ASSAY: CPT

## 2022-01-02 RX ORDER — FLUTICASONE PROPIONATE 50 MCG
2 SPRAY, SUSPENSION (ML) NASAL DAILY
Status: DISCONTINUED | OUTPATIENT
Start: 2022-01-02 | End: 2022-01-03 | Stop reason: HOSPADM

## 2022-01-02 RX ORDER — BUTALBITAL, ACETAMINOPHEN AND CAFFEINE 50; 325; 40 MG/1; MG/1; MG/1
1 TABLET ORAL EVERY 4 HOURS PRN
Status: DISCONTINUED | OUTPATIENT
Start: 2022-01-02 | End: 2022-01-03 | Stop reason: HOSPADM

## 2022-01-02 RX ORDER — LEVOFLOXACIN 500 MG/1
500 TABLET, FILM COATED ORAL DAILY
Status: DISCONTINUED | OUTPATIENT
Start: 2022-01-02 | End: 2022-01-03 | Stop reason: HOSPADM

## 2022-01-02 RX ORDER — ERGOCALCIFEROL 1.25 MG/1
50000 CAPSULE ORAL WEEKLY
Status: DISCONTINUED | OUTPATIENT
Start: 2022-01-02 | End: 2022-01-03 | Stop reason: HOSPADM

## 2022-01-02 RX ORDER — M-VIT,TX,IRON,MINS/CALC/FOLIC 27MG-0.4MG
1 TABLET ORAL DAILY
Status: DISCONTINUED | OUTPATIENT
Start: 2022-01-02 | End: 2022-01-03 | Stop reason: HOSPADM

## 2022-01-02 RX ORDER — DIPHENHYDRAMINE HYDROCHLORIDE 50 MG/ML
25 INJECTION INTRAMUSCULAR; INTRAVENOUS EVERY 6 HOURS PRN
Status: DISCONTINUED | OUTPATIENT
Start: 2022-01-02 | End: 2022-01-03 | Stop reason: HOSPADM

## 2022-01-02 RX ORDER — BENZONATATE 100 MG/1
200 CAPSULE ORAL 3 TIMES DAILY PRN
Status: DISCONTINUED | OUTPATIENT
Start: 2022-01-02 | End: 2022-01-03 | Stop reason: HOSPADM

## 2022-01-02 RX ORDER — GUAIFENESIN 600 MG/1
600 TABLET, EXTENDED RELEASE ORAL 2 TIMES DAILY
Status: DISCONTINUED | OUTPATIENT
Start: 2022-01-02 | End: 2022-01-03 | Stop reason: HOSPADM

## 2022-01-02 RX ADMIN — GUAIFENESIN 600 MG: 600 TABLET, EXTENDED RELEASE ORAL at 11:51

## 2022-01-02 RX ADMIN — PROBIOTIC PRODUCT - TAB 1 TABLET: TAB at 17:03

## 2022-01-02 RX ADMIN — AZITHROMYCIN MONOHYDRATE 500 MG: 500 INJECTION, POWDER, LYOPHILIZED, FOR SOLUTION INTRAVENOUS at 01:01

## 2022-01-02 RX ADMIN — GUAIFENESIN 600 MG: 600 TABLET, EXTENDED RELEASE ORAL at 20:13

## 2022-01-02 RX ADMIN — ACETAMINOPHEN 650 MG: 325 TABLET ORAL at 11:59

## 2022-01-02 RX ADMIN — SODIUM CHLORIDE, PRESERVATIVE FREE 10 ML: 5 INJECTION INTRAVENOUS at 07:36

## 2022-01-02 RX ADMIN — MULTIPLE VITAMINS W/ MINERALS TAB 1 TABLET: TAB at 11:51

## 2022-01-02 RX ADMIN — SODIUM CHLORIDE, PRESERVATIVE FREE 10 ML: 5 INJECTION INTRAVENOUS at 20:14

## 2022-01-02 RX ADMIN — SENNOSIDES AND DOCUSATE SODIUM 2 TABLET: 50; 8.6 TABLET ORAL at 07:36

## 2022-01-02 RX ADMIN — ERGOCALCIFEROL 50000 UNITS: 1.25 CAPSULE ORAL at 12:06

## 2022-01-02 RX ADMIN — APIXABAN 10 MG: 5 TABLET, FILM COATED ORAL at 20:13

## 2022-01-02 RX ADMIN — PANTOPRAZOLE SODIUM 40 MG: 40 TABLET, DELAYED RELEASE ORAL at 06:29

## 2022-01-02 RX ADMIN — DIPHENHYDRAMINE HYDROCHLORIDE 25 MG: 50 INJECTION, SOLUTION INTRAMUSCULAR; INTRAVENOUS at 01:31

## 2022-01-02 RX ADMIN — FLUTICASONE PROPIONATE 2 SPRAY: 50 SPRAY, METERED NASAL at 11:51

## 2022-01-02 RX ADMIN — CEFTRIAXONE SODIUM 1000 MG: 1 INJECTION, POWDER, FOR SOLUTION INTRAMUSCULAR; INTRAVENOUS at 00:01

## 2022-01-02 RX ADMIN — PROBIOTIC PRODUCT - TAB 1 TABLET: TAB at 11:51

## 2022-01-02 RX ADMIN — APIXABAN 10 MG: 5 TABLET, FILM COATED ORAL at 11:51

## 2022-01-02 RX ADMIN — LEVOFLOXACIN 500 MG: 500 TABLET, FILM COATED ORAL at 11:51

## 2022-01-02 ASSESSMENT — PAIN SCALES - GENERAL
PAINLEVEL_OUTOF10: 0
PAINLEVEL_OUTOF10: 3
PAINLEVEL_OUTOF10: 3
PAINLEVEL_OUTOF10: 0

## 2022-01-02 NOTE — PROGRESS NOTES
Physical Therapy    Facility/Department: [facility]  Initial Assessment    NAME: Aryan Taylor  : 1970  MRN: [id number]    Date of Service: 2022    Discharge Recommendations:  Home with assist PRN       Pt presented to ED on 21 with chest pain,  shortness of breath along with cough over the last 2 to 3 days. Reports abdominal plasty roughly a month ago. Went to urgent care due to Covid concerns. Multiple family members have tested positive for Covid since . Patient received a Covid swab but she tells me was negative an EKG which she took was abnormal and directed to the ER. Does report pleuritic chest pain. CT chest showed pneumonia and bilateral pulmonary embolisms  Pt admitted for further medical management of Bilateral PE       Assessment   Body structures, Functions, Activity limitations: Decreased functional mobility   Assessment: By end of visit, Pt demonstrated independence with good activity tolerance so has no further PT needs. Ed on deep breathing with incentive spirometer including technique, frequency and purpose &, to do regular activity for prevention of sedentary complications. Pt able to demonstrate correct technique  Prognosis: Excellent  Decision Making: Low Complexity  Exam: ROM, MMT, functional mobility, activity tolerance, Balance, & [employer] AM-PAC 6 Clicks Basic Mobility  Clinical Presentation: stable  PT Education: Goals;PT Role;Transfer Training;General Safety; Energy Conservation;Gait Training;Functional Mobility Training  Patient Education: Ed pt on functional mobility, safety awareness, importance of being up & OOB to regain strength, & prevention of sedentary complications, circulation ex's,  & optimal breathing techniques  REQUIRES PT FOLLOW UP: No  Activity Tolerance  Activity Tolerance: Patient Tolerated treatment well  Activity Tolerance: saO2 in safe range of 93-97% with activity on room air       Patient Diagnosis(es): The encounter diagnosis was Pulmonary embolism without acute cor pulmonale, unspecified chronicity, unspecified pulmonary embolism type ([facility] [address] has a past medical history of Abnormal findings on esophagogastroduodenoscopy (EGD), Arthritis, Chronic constipation, Gastritis, GERD (gastroesophageal reflux disease), H. pylori infection, Headache(784.0), Hiatal hernia, Prolonged emergence from general anesthesia, and Sleep apnea. has a past surgical history that includes Hysterectomy; Cholecystectomy (11/1991); Tubal ligation; Nasal sinus surgery; LEEP; laparoscopy; Gastric bypass surgery (2016); Colonoscopy; Endoscopy, colon, diagnostic (06/12/2020); Colonoscopy (N/A, 3/10/2021); Cosmetic surgery (11/04/2021); Cosmetic surgery (11/04/2021); lipectomy (N/A, 11/4/2021); and Abdominoplasty (N/A, 11/4/2021).     Restrictions  Restrictions/Precautions  Restrictions/Precautions: General Precautions,Fall Risk  Position Activity Restriction  Other position/activity restrictions: Up as tolerated, telemetry, RUE IV  Vision/Hearing  Vision: Impaired  Vision Exceptions: Wears glasses for distance;Wears glasses at all times  Hearing: Within functional limits     Subjective  General  Chart Reviewed: Yes  Patient assessed for rehabilitation services?: Yes  Additional Pertinent Hx: gastric bypass surgery, GERD and EDDIE, hiatal hernia  Response To Previous Treatment: Not applicable  General Comment  Comments: RN okays PT  Subjective  Subjective: Pt agreeable to PT  Pain Screening  Patient Currently in Pain: Denies  Vital Signs  Patient Currently in Pain: Denies       Orientation  Orientation  Overall Orientation Status: Within Functional Limits  Social/Functional History  Social/Functional History  Lives With: Family  Type of Home: House  Home Layout: One level  Home Access: Stairs to enter with rails  Entrance Stairs - Number of Steps: 2  Entrance Stairs - Rails: Right  Bathroom Shower/Tub: Tub/Shower unit  Bathroom Toilet: Standard  ADL Assistance: Independent  Homemaking Assistance: Independent (shares with other family but does the [de-identified])  Homemaking Responsibilities: Yes  Ambulation Assistance: Independent  Transfer Assistance: Independent  Active : Yes  Mode of Transportation: Car  Occupation: Full time employment  Type of occupation: medical assistant [employer] Mace: TV or games on phone  Additional Comments: Pt denies falls  Cognition   Cognition  Overall Cognitive Status: WNL    Objective     Observation/Palpation  Posture: Good  Observation: pt resting in bed, appears comfortable but has congested respirations, pt wearing abd binder s/p abdominoplasty one month ago    AROM RLE (degrees)  RLE AROM: WFL  AROM LLE (degrees)  LLE AROM : WFL  Strength RLE  Strength RLE: WFL  Strength LLE  Strength LLE: WFL  Tone RLE  RLE Tone: Normotonic  Tone LLE  LLE Tone: Normotonic  Motor Control  Gross Motor?: WNL  Sensation  Overall Sensation Status: WFL (denies any paresthesias)  Bed mobility  Rolling to Left: Independent  Supine to Sit: Supervision  Scooting: Supervision  Comment: MIN cues for pacing & pursed lip breathing tech, and use of BUE's to scoot fully out to EOB as well as awareness/assist with lines to increase safety. Transfers  Sit to Stand: Stand by assistance  Stand to sit: Stand by assistance  Bed to Chair: Stand by assistance  Stand Pivot Transfers: Stand by assistance  Lateral Transfers: Stand by assistance  Comment: Ed + tactile assist on correct use of upper body for safe sit/stand + to back all way back to surface until she touch behind legs & to ensure she reaches with UB support to arms of chair  Ambulation  Ambulation?: Yes  More Ambulation?: Yes  Ambulation 1  Surface: level tile  Device: No Device  Assistance: Contact guard assistance  Quality of Gait: step through pattern  Distance: 40ft  Comments: Pt amb to BR for toileting, Stand by Assistance to sit to toilet. Pt stood with Supervision, amb 2ft to sink for hand hygiene x 3 minutes  Ambulation 2  Surface - 2: level tile  Device 2: No device  Assistance 2: Supervision  Quality of Gait 2: step mthrough pattern with good stability & actvity tolerance  Distance: 200ft     Balance  Posture: Good  Sitting - Static: Good  Sitting - Dynamic: Good  Standing - Static: Good  Exercises  Comments: Ed circulation ex's & deep breathing with incentive spirometer including technique, frequency and purpose, pressure relief, & ex's to move what moves to maintain strength & joint congruency    All lines intact, call light within reach, and patient positioned comfortably at end of treatment. All patient needs addressed prior to ending therapy session.           Plan   Plan  Times per week: this visit for functional mobility, transfers, gait & pt education  Safety Devices  Type of devices: Call light within reach,Gait belt,Left in chair,Nurse notified    G-Code       OutComes Score                                                  AM-PAC Score  AM-PAC Inpatient Mobility Raw Score : 24 (01/02/22 0830)  AM-PAC Inpatient T-Scale Score : 61.14 (01/02/22 0830)  Mobility Inpatient CMS 0-100% Score: 0 (01/02/22 0830)  Mobility Inpatient CMS G-Code Modifier : CH (01/02/22 0830)          Goals  Short term goals  Time Frame for Short term goals: 1 visit  Short term goal 1: Pt will demonstrate independent functional mobility including gait of at least 200ft  Short term goal 2: Ed pt on importance of regular activity for prevention of sedentary complications & deep breathing ex's with pt able to demonstrate correct technique       Therapy Time   Individual Concurrent Group Co-treatment   Time In 0755         Time Out 0830         Minutes 35+10=45              Additional 10 minutes for chart review      Treatment time: 29 minutes      [address], PT

## 2022-01-02 NOTE — PROGRESS NOTES
[facility]  Office: [address], DO, Ana Barrientos, DO, Amaris Vera, DO, Chris Freedman, DO, Griffin Monge MD, Oj Coates MD, Susan Morales MD, Karson Shannon MD, Howard Segura MD, Deanna Garcia MD, Craig Beal MD, Madelyn Vo, DO, Yenifer Lockhart, DO, Maria Elena Berrios MD,  Rosemary Renteria, DO, Aminta Aguilera MD, Helga Cheung MD, Lynda Du MD, Emma Horton MD, Macy Nagel MD, Ashely Lockett MD, León Hamlin MD, Sonya [facility], [facility] Pompa, CNP, Verónica Johnston, CNP, Ed Schwab, CNS, Jean Paul Tiwari, CNP, Narciso Mckeon, CNP, Ariana Li, CNP, Misa aHwley, CNP, Kai Rebolledo, CNP, Sandoval Crowley PA-C, Dannielle Crystal, DNP, Ana Villa DNP, Wallace Herrera, CNP, Arabella Woo, CNP, Dudley Givens, CNP, Carmina Palacio, CNP, Hong Lin, CNP, Rama Cunha, Rbo [facility]    Progress Note    1/2/2022    10:13 AM    Name:   Jimenez Reynoso  MRN:     [id number]     Acct:      [de-identified]   Room:   [address] [place] [address] Day:  2  Admit Date:  12/31/2021  6:34 PM    PCP:   Maria Fernanda Cortez MD  Code Status:  Full Code    Subjective:     C/C: congestion  Chief Complaint   Patient presents with    Chest Pain     seen at [address] [facility] and was told to come to the ED because her EKG was abnormal, Covid negative    Shortness of Breath     Interval History Status: improved. Patient resting in chair, on room air. Complains of cough, chest discomfort while trying to take a deep breath. Reports her cough is productive with green sputum, and she has been febrile up to 101. No abdominal complaints. She complains of headache. She sounds and reports feeling congested. Brief History:     Per my partner's documentation:    Brandee Delgadillo is a 54 y.o.   /  female who presents with Chest Pain (seen at [address] and was told to come to the ED because her EKG was abnormal, Covid negative) and Shortness of Breath   and is admitted to the hospital for the management of <principal problem not specified>.     The patient reports to the hospital with complaint of shortness of breath and chest tightness. She states her symptoms started yesterday and progressively worsened. She endorses midsternal chest heaviness that she describes as constant, dull and severe. She states it worsens when she coughs and that it at times feels like a squeezing sensation. She went to urgent care, as she was concerned she had COVID-19. She had a negative COVID-19 test there, but was noted to have EKG changes. She was advised to come to the emergency department. She was subsequently found to have pneumonia and bilateral pulmonary embolisms on CT scan. A second COVID-19 test conducted by our ER was negative. Of note, the patient recently underwent an extensive abdominal surgery on November 4, 2021 consisting of a paniculectomy and abdominoplasty. She denies fever, chills, nausea or vomiting. No previous history of DVT. She is has received x2 COVID-19 vaccinations. She has past medical history that includes gastric bypass surgery, GERD and EDDIE.      [facility]: POCT SARS COV 2 veritor negative. Rapid SARS-CoV-2 conducted in our emergency department negative.     High sensitivity troponin <6, <6. WBC 5.9, d dimer 0.57.      CT chest with contrast shows: 1. Small emboli within right upper and left upper lobe segmental branches of the pulmonary arteries, without evidence of RV strain 2. Patchy airspace disease present bilaterally, most prominent within the left lower lobe, suggesting pneumonia and or atelectasis 3.  Trace bilateral pleural effusions \"    Review of Systems:     Constitutional:  negative for chills, sweats, + fever  Respiratory:  negative for  dyspnea on exertion, shortness of breath, wheezing, + cough  Cardiovascular:  negative for chest pain, + chest pressure/discomfort, no lower extremity edema, palpitations  Gastrointestinal:  negative for abdominal pain, constipation, diarrhea, nausea, vomiting  Neurological:  negative for dizziness, + headache    Medications: Allergies: Allergies   Allergen Reactions    Nsaids     Hydrocodone-Acetaminophen Nausea Only    Oxycodone-Acetaminophen Nausea Only    Sulfamethoxazole-Trimethoprim Hives and Rash       Current Meds:   Scheduled Meds:    vitamin D  50,000 Units Oral Weekly    guaiFENesin  600 mg Oral BID    apixaban  10 mg Oral BID    levoFLOXacin  500 mg Oral Daily    fluticasone  2 spray Each Nostril Daily    lactobacillus  1 tablet Oral TID WC    therapeutic multivitamin-minerals  1 tablet Oral Daily    pantoprazole  40 mg Oral QAM AC    sennosides-docusate sodium  2 tablet Oral Daily    sodium chloride flush  5-40 mL IntraVENous 2 times per day    influenza virus vaccine  0.5 mL IntraMUSCular Prior to discharge     Continuous Infusions:    sodium chloride       PRN Meds: diphenhydrAMINE, benzonatate, butalbital-acetaminophen-caffeine, sodium chloride flush, sodium chloride, ondansetron **OR** ondansetron, magnesium hydroxide, acetaminophen **OR** acetaminophen, sodium chloride flush    Data:     Past Medical History:   has a past medical history of Abnormal findings on esophagogastroduodenoscopy (EGD), Arthritis, Chronic constipation, Gastritis, GERD (gastroesophageal reflux disease), H. pylori infection, Headache(784.0), Hiatal hernia, Prolonged emergence from general anesthesia, and Sleep apnea. Social History:   reports that she quit smoking about 9 years ago. She has never used smokeless tobacco. She reports previous alcohol use. She reports that she does not use drugs.      Family History:   Family History   Problem Relation Age of Onset    Cancer Mother 64        ovarian cancer     No Known Problems Father        Vitals:  /81   Pulse 97   Temp 99.7 °F (37.6 °C) (Oral)   Resp 16   Ht 5' 6\" (1.676 m)   Wt 176 lb 6 oz (80 kg)   SpO2 94%   BMI 28.47 kg/m²   Temp (24hrs), Av.7 °F (37.6 °C), Min:98.8 °F (37.1 °C), Max:100.8 °F (38.2 °C)        I/O (24Hr): No intake or output data in the 24 hours ending 22 1013    Labs:  Hematology:  Recent Labs     21  0534   WBC 5.9 7.0 5.1   RBC 4.81 4.68 4.78   HGB 13.2 12.8 13.3   HCT 41.1 40.1 40.8   MCV 85.4 85.7 85.4   MCH 27.4 27.4 27.8   MCHC 32.1 31.9 32.6   RDW 12.9 12.9 13.0    251 235   MPV 9.1 9.2 9.5   INR 1.0 1.0  --    DDIMER 0.57  --   --      Chemistry:  Recent Labs     21  0530     --   --   --  142   K 3.7  --   --   --  3.9     --   --   --  107   CO2 25  --   --   --  24   GLUCOSE 95  --   --   --  86   BUN 5*  --   --   --  8   CREATININE 0.60  --   --   --  0.75   ANIONGAP 10  --   --   --  11   LABGLOM >60  --   --   --  >60   GFRAA >60  --   --   --  >60   CALCIUM 8.9  --   --   --  8.7   TROPHS <6   < > <6 <6 6   MYOGLOBIN <21*  --  <21* 23*  --     < > = values in this interval not displayed. Recent Labs     21  0530   PROT 7.0 6.4   LABALBU 4.1 3.9   AST 21 21   ALT 15 14   LDH  --  154   ALKPHOS 76 81   BILITOT 0.38 0.32     ABG:  Lab Results   Component Value Date/Time    SPECIAL NOT REPORTED 2022 10:08 AM     Lab Results   Component Value Date/Time    CULTURE NO GROWTH 12 HOURS 2022 10:08 AM       Radiology:  CT CHEST PULMONARY EMBOLISM W CONTRAST    Result Date: 2021  1. Small emboli within right upper and left upper lobe segmental branches of the pulmonary arteries, without evidence of RV strain 2. Patchy airspace disease present bilaterally, most prominent within the left lower lobe, suggesting pneumonia and or atelectasis 3. Trace bilateral pleural effusions 4. Critical results were called by Dr. Lucille Salazar to [place] on 2021 at 9:44 p.m. hours.  RECOMMENDATIONS: Unavailable       Physical Examination:        General appearance:  alert, cooperative and no distress  Mental Status:  oriented to person, place and time and normal affect  Lungs:  clear to auscultation bilaterally, normal effort, + congested cough observed  Heart:  regular rate and rhythm, no murmur  Abdomen:  soft, nontender, nondistended, normal bowel sounds, no masses, hepatomegaly, splenomegaly  Extremities:  no edema, redness, tenderness in the calves  Skin:  no gross lesions, rashes, induration    Assessment:        Hospital Problems           Last Modified POA    * (Principal) Pulmonary embolism without acute cor pulmonale ([address] 12/31/2021 Yes    Hx of bariatric surgery 12/31/2021 Yes    Vitamin D deficiency 1/2/2022 Yes    Pneumonia due to infectious organism 1/2/2022 Yes          Plan:        1. PE without acute cor pulmonale: Start Eliquis 10 mg BID today. Discontinue heparin. Monitor SpO2. Supplemental O2 as needed to keep SpO2 > 92%. 2D echo tomorrow. 2. Vit D deficiency: Start weekly vit D PO supplementation. Recheck levels as OP in 10 weeks per PCP. 3. Pneumonia: IS q 2 hours while awake. Flonase daily. Monitor CBC. Monitor for fever. Obtain resp viral panel and sputum for culture. Discontinue IV atb abd start PO Levaquin d/t productive cough and fever. Mucinex BID, tessalon prn for cough. Probiotic TID. Anticipate discharge with 24-48 hours if patient continues to improve.      Mary Van, GRICELDA - NP  1/2/2022  10:13 AM

## 2022-01-02 NOTE — CARE COORDINATION
Discharge Planning:    Patient was started on eliquis today.  Will need to obtain order and call into pharmacy to verify cost.

## 2022-01-03 VITALS
RESPIRATION RATE: 16 BRPM | OXYGEN SATURATION: 96 % | HEIGHT: 66 IN | TEMPERATURE: 98.7 F | BODY MASS INDEX: 28.34 KG/M2 | SYSTOLIC BLOOD PRESSURE: 129 MMHG | DIASTOLIC BLOOD PRESSURE: 80 MMHG | WEIGHT: 176.38 LBS | HEART RATE: 78 BPM

## 2022-01-03 PROBLEM — J12.3 HUMAN METAPNEUMOVIRUS PNEUMONIA: Status: ACTIVE | Noted: 2022-01-03

## 2022-01-03 PROBLEM — B34.2 CORONAVIRUS INFECTION: Status: ACTIVE | Noted: 2022-01-03

## 2022-01-03 LAB
ADENOVIRUS PCR: NOT DETECTED
ANION GAP SERPL CALCULATED.3IONS-SCNC: 9 MMOL/L (ref 9–17)
BORDETELLA PARAPERTUSSIS: NOT DETECTED
BORDETELLA PERTUSSIS PCR: NOT DETECTED
BUN BLDV-MCNC: 7 MG/DL (ref 6–20)
BUN/CREAT BLD: 10 (ref 9–20)
CALCIUM SERPL-MCNC: 9 MG/DL (ref 8.6–10.4)
CHLAMYDIA PNEUMONIAE BY PCR: NOT DETECTED
CHLORIDE BLD-SCNC: 107 MMOL/L (ref 98–107)
CO2: 25 MMOL/L (ref 20–31)
CORONAVIRUS 229E PCR: DETECTED
CORONAVIRUS HKU1 PCR: NOT DETECTED
CORONAVIRUS NL63 PCR: NOT DETECTED
CORONAVIRUS OC43 PCR: NOT DETECTED
CREAT SERPL-MCNC: 0.69 MG/DL (ref 0.5–0.9)
EKG ATRIAL RATE: 78 BPM
EKG P AXIS: 63 DEGREES
EKG P-R INTERVAL: 124 MS
EKG Q-T INTERVAL: 372 MS
EKG QRS DURATION: 76 MS
EKG QTC CALCULATION (BAZETT): 424 MS
EKG R AXIS: 60 DEGREES
EKG T AXIS: 45 DEGREES
EKG VENTRICULAR RATE: 78 BPM
GFR AFRICAN AMERICAN: >60 ML/MIN
GFR NON-AFRICAN AMERICAN: >60 ML/MIN
GFR SERPL CREATININE-BSD FRML MDRD: ABNORMAL ML/MIN/{1.73_M2}
GFR SERPL CREATININE-BSD FRML MDRD: ABNORMAL ML/MIN/{1.73_M2}
GLUCOSE BLD-MCNC: 100 MG/DL (ref 70–99)
HCT VFR BLD CALC: 41.8 % (ref 36.3–47.1)
HEMOGLOBIN: 13.5 G/DL (ref 11.9–15.1)
HUMAN METAPNEUMOVIRUS PCR: DETECTED
INFLUENZA A BY PCR: NOT DETECTED
INFLUENZA A H1 (2009) PCR: ABNORMAL
INFLUENZA A H1 PCR: ABNORMAL
INFLUENZA A H3 PCR: ABNORMAL
INFLUENZA B BY PCR: NOT DETECTED
LV EF: 60 %
LVEF MODALITY: NORMAL
MCH RBC QN AUTO: 27.6 PG (ref 25.2–33.5)
MCHC RBC AUTO-ENTMCNC: 32.3 G/DL (ref 28.4–34.8)
MCV RBC AUTO: 85.5 FL (ref 82.6–102.9)
MYCOPLASMA PNEUMONIAE PCR: NOT DETECTED
NRBC AUTOMATED: 0 PER 100 WBC
PARAINFLUENZA 1 PCR: NOT DETECTED
PARAINFLUENZA 2 PCR: NOT DETECTED
PARAINFLUENZA 3 PCR: NOT DETECTED
PARAINFLUENZA 4 PCR: NOT DETECTED
PDW BLD-RTO: 13 % (ref 11.8–14.4)
PLATELET # BLD: 226 K/UL (ref 138–453)
PMV BLD AUTO: 9 FL (ref 8.1–13.5)
POTASSIUM SERPL-SCNC: 3.9 MMOL/L (ref 3.7–5.3)
RBC # BLD: 4.89 M/UL (ref 3.95–5.11)
RESP SYNCYTIAL VIRUS PCR: NOT DETECTED
RHINO/ENTEROVIRUS PCR: NOT DETECTED
SARS-COV-2, PCR: NOT DETECTED
SODIUM BLD-SCNC: 141 MMOL/L (ref 135–144)
SPECIMEN DESCRIPTION: ABNORMAL
WBC # BLD: 5.4 K/UL (ref 3.5–11.3)

## 2022-01-03 PROCEDURE — 80048 BASIC METABOLIC PNL TOTAL CA: CPT

## 2022-01-03 PROCEDURE — 99232 SBSQ HOSP IP/OBS MODERATE 35: CPT | Performed by: INTERNAL MEDICINE

## 2022-01-03 PROCEDURE — 85027 COMPLETE CBC AUTOMATED: CPT

## 2022-01-03 PROCEDURE — 6370000000 HC RX 637 (ALT 250 FOR IP): Performed by: NURSE PRACTITIONER

## 2022-01-03 PROCEDURE — 2580000003 HC RX 258: Performed by: NURSE PRACTITIONER

## 2022-01-03 PROCEDURE — 36415 COLL VENOUS BLD VENIPUNCTURE: CPT

## 2022-01-03 PROCEDURE — 93010 ELECTROCARDIOGRAM REPORT: CPT | Performed by: INTERNAL MEDICINE

## 2022-01-03 PROCEDURE — 93306 TTE W/DOPPLER COMPLETE: CPT

## 2022-01-03 RX ORDER — FLUTICASONE PROPIONATE 50 MCG
2 SPRAY, SUSPENSION (ML) NASAL DAILY
Qty: 16 G | Refills: 3 | Status: SHIPPED | OUTPATIENT
Start: 2022-01-04

## 2022-01-03 RX ORDER — BUTALBITAL, ACETAMINOPHEN AND CAFFEINE 50; 325; 40 MG/1; MG/1; MG/1
1 TABLET ORAL EVERY 4 HOURS PRN
Qty: 15 TABLET | Refills: 0 | Status: SHIPPED | OUTPATIENT
Start: 2022-01-03

## 2022-01-03 RX ORDER — GUAIFENESIN 600 MG/1
600 TABLET, EXTENDED RELEASE ORAL 2 TIMES DAILY
Qty: 14 TABLET | Refills: 0 | Status: SHIPPED | OUTPATIENT
Start: 2022-01-03 | End: 2022-01-10

## 2022-01-03 RX ORDER — LEVOFLOXACIN 500 MG/1
500 TABLET, FILM COATED ORAL DAILY
Qty: 7 TABLET | Refills: 0 | Status: SHIPPED | OUTPATIENT
Start: 2022-01-04 | End: 2022-01-11

## 2022-01-03 RX ORDER — ERGOCALCIFEROL 1.25 MG/1
50000 CAPSULE ORAL WEEKLY
Qty: 5 CAPSULE | Refills: 0 | Status: SHIPPED | OUTPATIENT
Start: 2022-01-09 | End: 2022-03-31 | Stop reason: SDUPTHER

## 2022-01-03 RX ADMIN — PROBIOTIC PRODUCT - TAB 1 TABLET: TAB at 13:13

## 2022-01-03 RX ADMIN — PROBIOTIC PRODUCT - TAB 1 TABLET: TAB at 10:05

## 2022-01-03 RX ADMIN — MULTIPLE VITAMINS W/ MINERALS TAB 1 TABLET: TAB at 10:04

## 2022-01-03 RX ADMIN — LEVOFLOXACIN 500 MG: 500 TABLET, FILM COATED ORAL at 10:05

## 2022-01-03 RX ADMIN — PANTOPRAZOLE SODIUM 40 MG: 40 TABLET, DELAYED RELEASE ORAL at 05:34

## 2022-01-03 RX ADMIN — GUAIFENESIN 600 MG: 600 TABLET, EXTENDED RELEASE ORAL at 10:04

## 2022-01-03 RX ADMIN — ACETAMINOPHEN 650 MG: 325 TABLET ORAL at 01:09

## 2022-01-03 RX ADMIN — PROBIOTIC PRODUCT - TAB 1 TABLET: TAB at 17:08

## 2022-01-03 RX ADMIN — SENNOSIDES AND DOCUSATE SODIUM 2 TABLET: 50; 8.6 TABLET ORAL at 10:05

## 2022-01-03 RX ADMIN — SODIUM CHLORIDE, PRESERVATIVE FREE 10 ML: 5 INJECTION INTRAVENOUS at 10:04

## 2022-01-03 RX ADMIN — FLUTICASONE PROPIONATE 2 SPRAY: 50 SPRAY, METERED NASAL at 10:06

## 2022-01-03 RX ADMIN — BENZONATATE 200 MG: 100 CAPSULE ORAL at 10:04

## 2022-01-03 RX ADMIN — APIXABAN 10 MG: 5 TABLET, FILM COATED ORAL at 10:04

## 2022-01-03 ASSESSMENT — PAIN SCALES - GENERAL
PAINLEVEL_OUTOF10: 0
PAINLEVEL_OUTOF10: 0
PAINLEVEL_OUTOF10: 3
PAINLEVEL_OUTOF10: 0

## 2022-01-03 ASSESSMENT — PAIN DESCRIPTION - ONSET: ONSET: GRADUAL

## 2022-01-03 ASSESSMENT — PAIN DESCRIPTION - FREQUENCY: FREQUENCY: INTERMITTENT

## 2022-01-03 ASSESSMENT — PAIN DESCRIPTION - DESCRIPTORS: DESCRIPTORS: HEADACHE

## 2022-01-03 ASSESSMENT — PAIN - FUNCTIONAL ASSESSMENT: PAIN_FUNCTIONAL_ASSESSMENT: ACTIVITIES ARE NOT PREVENTED

## 2022-01-03 ASSESSMENT — PAIN DESCRIPTION - DIRECTION: RADIATING_TOWARDS: DENIES

## 2022-01-03 ASSESSMENT — PAIN DESCRIPTION - PAIN TYPE: TYPE: ACUTE PAIN

## 2022-01-03 ASSESSMENT — PAIN DESCRIPTION - ORIENTATION: ORIENTATION: ANTERIOR

## 2022-01-03 ASSESSMENT — PAIN DESCRIPTION - LOCATION: LOCATION: HEAD

## 2022-01-03 NOTE — PLAN OF CARE
Problem: Pain:  Goal: Pain level will decrease  Description: Pain level will decrease  Outcome: Ongoing   Pt currently denies pain. Problem: Falls - Risk of:  Goal: Will remain free from falls  Description: Will remain free from falls  Outcome: Ongoing   No falls noted this shift. Problem: Gas Exchange - Impaired:  Goal: Levels of oxygenation will improve  Description: Levels of oxygenation will improve  Outcome: Ongoing   Pt continues on RA. Pt continues to have congestion. Will continue to monitor.

## 2022-01-03 NOTE — PROGRESS NOTES
Notified MAULIK Bermudez of results of TTE report via phone. To let Dr. Shania Palmer know of results. Possible discharge this evening.

## 2022-01-03 NOTE — PROGRESS NOTES
CLINICAL PHARMACY NOTE: MEDS TO BEDS    Total # of Prescriptions Filled: 1   The following medications were delivered to the patient:  · [facility] [facility]    Additional Documentation:

## 2022-01-03 NOTE — DISCHARGE INSTR - DIET

## 2022-01-03 NOTE — PLAN OF CARE
Problem: Pain:  Goal: Control of acute pain  Description: Control of acute pain  Outcome: Ongoing  Note: Pt verbalizes adequate management of pain this shift. Problem: Falls - Risk of:  Goal: Will remain free from falls  Description: Will remain free from falls  Outcome: Ongoing  Note: Pt remains free from falls, fall risk education reinforced this shift.

## 2022-01-03 NOTE — PROGRESS NOTES
[facility]  Office: [address], DO, Ene Andrews, DO, Panda Gracia, DO, Narciso Chaparroarabella Blood, DO, Nikolai Burkett MD, Emanuel Teague MD, Tana Ocampo MD, Arabella Camejo MD, Renetta Bustamante MD, Christiana Linda MD, Suman Edwards MD, Merline Faustin, DO, Darek Alva, DO, Dexter Avalos MD,  Mark Pina, DO, Karley Sosa MD, Sara Quintero MD, Jean Pierre Cartagena MD, Radha Lal MD, Damaris Muhammad MD, Dottie Simon MD, Jabari Cuba MD, Grant Dotson [facility], [facility], CNP, Jimmy Tirado, CNP, Suman Ron, CNS, Lesley Inman, CNP, Tod Oropeza, CNP, Steven Cummings, CNP, Lilli Carlisle, CNP, Sierra Hayes, CNP, Pippa Duque PA-C, Jacqui Rodriguez, DNP, Kevin Mcginnis, [facility] ([place], [place]), Mando Husbands, CNP, Jace Trinh, CNP, Colton Carreno, CNP, Andreina Howard, CNP, Emily Becker, [facility], Osvaldo Zambrano [facility]    Progress Note    1/3/2022    12:35 PM    Name:   Alin Moody  MRN:     [id number]     Acct:      [de-identified]   Room:   [address] [place] [address] Day:  3  Admit Date:  12/31/2021  6:34 PM    PCP:   Fermin Scales MD  Code Status:  Full Code    Subjective:     C/C:   Chief Complaint   Patient presents with    Chest Pain     seen at [facility] [address] [facility] and was told to come to the ED because her EKG was abnormal, Covid negative    Shortness of Breath     Interval History Status:   Denies chest pain at present  Denies shortness of breath while sitting in bed  Saturating 96% on room air  Still has mild productive cough with green sputum  No fever today  Echocardiogram has not been done yet  Rapid Covid was negative  PCR positive for human metapneumovirus and coronavirus 229 E  Brief History:   Per my partner's documentation:     \"Britt Rosario is a 54 y.o.  / Dusty Wang presents with Chest Pain (seen at [address] [facility] and was told to come to the ED because her EKG was abnormal, Covid negative) and Shortness constipation, diarrhea, nausea, vomiting  Neurological:  negative for dizziness, headache    Medications: Allergies: Allergies   Allergen Reactions    Nsaids     Hydrocodone-Acetaminophen Nausea Only    Oxycodone-Acetaminophen Nausea Only    Sulfamethoxazole-Trimethoprim Hives and Rash       Current Meds:   Scheduled Meds:    vitamin D  50,000 Units Oral Weekly    guaiFENesin  600 mg Oral BID    apixaban  10 mg Oral BID    levoFLOXacin  500 mg Oral Daily    fluticasone  2 spray Each Nostril Daily    lactobacillus  1 tablet Oral TID WC    therapeutic multivitamin-minerals  1 tablet Oral Daily    pantoprazole  40 mg Oral QAM AC    sennosides-docusate sodium  2 tablet Oral Daily    sodium chloride flush  5-40 mL IntraVENous 2 times per day    influenza virus vaccine  0.5 mL IntraMUSCular Prior to discharge     Continuous Infusions:    sodium chloride       PRN Meds: diphenhydrAMINE, benzonatate, butalbital-acetaminophen-caffeine, sodium chloride flush, sodium chloride, ondansetron **OR** ondansetron, magnesium hydroxide, acetaminophen **OR** acetaminophen, sodium chloride flush    Data:     Past Medical History:   has a past medical history of Abnormal findings on esophagogastroduodenoscopy (EGD), Arthritis, Chronic constipation, Gastritis, GERD (gastroesophageal reflux disease), H. pylori infection, Headache(784.0), Hiatal hernia, Prolonged emergence from general anesthesia, and Sleep apnea. Social History:   reports that she quit smoking about 9 years ago. She has never used smokeless tobacco. She reports previous alcohol use. She reports that she does not use drugs.      Family History:   Family History   Problem Relation Age of Onset    Cancer Mother 64        ovarian cancer     No Known Problems Father        Vitals:  /78   Pulse 77   Temp 98.2 °F (36.8 °C) (Oral)   Resp 16   Ht 5' 6\" (1.676 m)   Wt 176 lb 6 oz (80 kg)   SpO2 94%   BMI 28.47 kg/m²   Temp (24hrs), Av.7 °F (37.1 °C), Min:97.7 °F (36.5 °C), Max:99.9 °F (37.7 °C)    No results for input(s): POCGLU in the last 72 hours. I/O (24Hr): Intake/Output Summary (Last 24 hours) at 1/3/2022 1235  Last data filed at 1/3/2022 1018  Gross per 24 hour   Intake 250 ml   Output --   Net 250 ml       Labs:  Hematology:  Recent Labs     12/31/21 1948 12/31/21 1948 01/01/22 0530 01/02/22  0534 01/03/22  0616   WBC 5.9   < > 7.0 5.1 5.4   RBC 4.81   < > 4.68 4.78 4.89   HGB 13.2   < > 12.8 13.3 13.5   HCT 41.1   < > 40.1 40.8 41.8   MCV 85.4   < > 85.7 85.4 85.5   MCH 27.4   < > 27.4 27.8 27.6   MCHC 32.1   < > 31.9 32.6 32.3   RDW 12.9   < > 12.9 13.0 13.0      < > 251 235 226   MPV 9.1   < > 9.2 9.5 9.0   INR 1.0  --  1.0  --   --    DDIMER 0.57  --   --   --   --     < > = values in this interval not displayed. Chemistry:  Recent Labs     12/31/21 1948 12/31/21 1948 12/31/21 2128 12/31/21 2321 01/01/22 0530 01/03/22  0616     --   --   --  142 141   K 3.7  --   --   --  3.9 3.9     --   --   --  107 107   CO2 25  --   --   --  24 25   GLUCOSE 95  --   --   --  86 100*   BUN 5*  --   --   --  8 7   CREATININE 0.60  --   --   --  0.75 0.69   ANIONGAP 10  --   --   --  11 9   LABGLOM >60  --   --   --  >60 >60   GFRAA >60  --   --   --  >60 >60   CALCIUM 8.9  --   --   --  8.7 9.0   TROPHS <6   < > <6 <6 6  --    MYOGLOBIN <21*  --  <21* 23*  --   --     < > = values in this interval not displayed.      Recent Labs     12/31/21 1948 01/01/22  0530   PROT 7.0 6.4   LABALBU 4.1 3.9   AST 21 21   ALT 15 14   LDH  --  154   ALKPHOS 76 81   BILITOT 0.38 0.32     ABG:No results found for: POCPH, PHART, PH, POCPCO2, IIY4DLC, PCO2, POCPO2, PO2ART, PO2, POCHCO3, YAK7UQA, HCO3, NBEA, PBEA, BEART, BE, THGBART, THB, KJS5DCF, SIWU5AIQ, S9DVICQH, O2SAT, FIO2  Lab Results   Component Value Date/Time    SPECIAL NOT REPORTED 01/01/2022 10:08 AM     Lab Results   Component Value Date/Time    CULTURE NO GROWTH 1 DAY 01/01/2022 10:08 AM       Radiology:  CT CHEST PULMONARY EMBOLISM W CONTRAST    Result Date: 12/31/2021  1. Small emboli within right upper and left upper lobe segmental branches of the pulmonary arteries, without evidence of RV strain 2. Patchy airspace disease present bilaterally, most prominent within the left lower lobe, suggesting pneumonia and or atelectasis 3. Trace bilateral pleural effusions 4. Critical results were called by Dr. Parry Gaucher to CHAIM Rocha on 12/31/2021 at 9:44 p.m. hours. RECOMMENDATIONS: Unavailable       Physical Examination:        General appearance:  alert, cooperative and no distress, obese  Mental Status:  oriented to person, place and time and normal affect  Lungs:  clear to auscultation bilaterally, normal effort  Heart:  regular rate and rhythm, no murmur  Abdomen:  soft, nontender, nondistended, normal bowel sounds, no masses, hepatomegaly, splenomegaly  Extremities:  no edema, redness, tenderness in the calves  Skin:  no gross lesions, rashes, induration    Assessment:        Hospital Problems           Last Modified POA    * (Principal) Pulmonary embolism without acute cor pulmonale ([address] 12/31/2021 Yes    Hx of bariatric surgery 12/31/2021 Yes    Vitamin D deficiency 1/2/2022 Yes    Pneumonia due to infectious organism 1/2/2022 Yes    Human metapneumovirus pneumonia 1/3/2022 Yes    Coronavirus -229 E 1/3/2022 Yes          Plan:        1. Continue Eliquis for PE, follow-up with PCP after discharge  2. Finish off oral Levaquin for suspected bacterial pneumonia in addition to other viral infections  3. Continue vitamin D for deficiency  4. Follow echo results  5.  Likely to be discharged today if echo is normal    Sophie Hayes MD  1/3/2022  12:35 PM

## 2022-01-03 NOTE — PROGRESS NOTES
Perfect served [facility] about positive PCR results of Human metapneumavirus and coronia virus 229E. Awaiting response. Per MAULIK Stern, Dr. Mehul Marks was updated on results.

## 2022-01-03 NOTE — CARE COORDINATION
Discharge planning    Patient admitted with PE and discussed a/c with attending. Patient to have starer pack on discharge for eliquis. She fills at [facility] and did ask pharmacy to run text rx to see if needs to have a PA done. If not will give patient the reduced copay card for future purchase. No PA and cost is free. Starter pack rx brought down to [facility] pharmacy to be filled.

## 2022-01-03 NOTE — DISCHARGE SUMMARY
[facility]  Office: [address], DO, Emiliana Lopez DO, Pam Interiano, DO, Keilachandrakant Freedman, DO, Grant Martin MD, Long Hernández MD, Conchita Avila MD, Norah Russell MD, Sis Burgos MD, Gavin Saravia MD, Beau Rojas MD, Amelie Jones DO, David Francis DO, Sai Bartlett MD,  Leida Zambrano DO, Barbara Eli MD, Kourtney Thompson MD, Ellen Victoria MD, Rikki Rosales MD, Kenna Garsia MD, Naif Don MD, Rashad Alvares MD, Delores Woodson [facility], [facility], [facility], Car Holland, CNP, Patrice Acevedo, CNS, Loren Rosemarie, CNP, Bhargavi Veloz, CNP, Chio Tate, CNP, Holly Becker, CNP, Sakina Hoff, CNP, Milissa Pallas, PA-C, Trudi Soulier, DNP, Minerva Kaminski, DNP, Sheri Avila, CNP, Jeyson Palomares, CNP, Frankey Blush, CNP, Darek Odom, CNP, Sly Sena, CNP, Nic Polanco, [facility]    Discharge Summary     Patient ID: Aviva Hanks  :  1970   MRN: [id number]     ACCOUNT:  [de-identified]   Patient's PCP: Fabi Solorzano MD  Admit Date: 2021   Discharge Date: 1/3/2022    Length of Stay: 3  Code Status:  Full Code  Admitting Physician: Barbara Eli MD  Discharge Physician: Josh Gant MD     Active Discharge Diagnoses:     Hospital Problem Lists:  Principal Problem:    Pulmonary embolism without acute cor pulmonale [facility])  Active Problems:    Hx of bariatric surgery    Vitamin D deficiency    Pneumonia due to infectious organism    Human metapneumovirus pneumonia    Coronavirus -229 E  Resolved Problems:    * No resolved hospital problems.  *      Admission Condition:  poor     Discharged Condition: stable    Hospital Stay:   Admitting history:  Brett Rosario is a 54 y.o.  / [place] Ramp presents with Chest Pain (seen at [facility] and was told to come to the ED because her EKG was abnormal, Covid negative) and Shortness of Breath   and is admitted to the hospital for the management of <principal problem not specified>.     The patient reports to the hospital with complaint of shortness of breath and chest tightness. She states her symptoms started yesterday and progressively worsened. She endorses midsternal chest heaviness that she describes as constant, dull and severe. She states it worsens when she coughs and that it at times feels like a squeezing sensation. She went to urgent care, as she was concerned she had COVID-19. She had a negative COVID-19 test there, but was noted to have EKG changes. She was advised to come to the emergency department. She was subsequently found to have pneumonia and bilateral pulmonary embolisms on CT scan. A second COVID-19 test conducted by our ER was negative. Of note, the patient recently underwent an extensive abdominal surgery on November 4, 2021 consisting of a paniculectomy and abdominoplasty. She denies fever, chills, nausea or vomiting. No previous history of DVT. She is has received x2 COVID-19 vaccinations. She has past medical history that includes gastric bypass surgery, GERD and EDDIE.      [facility]: POCT SARS COV 2 veritor negative. Rapid SARS-CoV-2 conducted in our emergency department negative.     High sensitivity troponin <6, <6. WBC 5.9, d dimer 0.57.      CT chest with contrast shows: 1. Small emboli within right upper and left upper lobe segmental branches of the pulmonary arteries, without evidence of RV strain 2. Patchy airspace disease present bilaterally, most prominent within the left lower lobe, suggesting pneumonia and or atelectasis 3.  Trace bilateral pleural effusions \"     Hospital Course:  Denies chest pain at present  Denies shortness of breath while sitting in bed  Saturating 96% on room air  Still has mild productive cough with green sputum  No fever today  Echocardiogram reportedly did not show any significant findings  rapid Covid was negative  PCR positive for human metapneumovirus and coronavirus 229 E     Plan:         1. Continue Eliquis for PE, follow-up with PCP after discharge  2. Finish off oral Levaquin for suspected bacterial pneumonia in addition to other viral infections  3. Continue vitamin D for deficiency  4. Verbal report of alcohol received from the nursing staff which did not show any significant finding  5.  Discharged home      Significant therapeutic interventions: See above notes    Significant Diagnostic Studies:   Labs / Micro:  CBC:   Lab Results   Component Value Date    WBC 5.4 01/03/2022    RBC 4.89 01/03/2022    HGB 13.5 01/03/2022    HCT 41.8 01/03/2022    MCV 85.5 01/03/2022    MCH 27.6 01/03/2022    MCHC 32.3 01/03/2022    RDW 13.0 01/03/2022     01/03/2022     BMP:    Lab Results   Component Value Date    GLUCOSE 100 01/03/2022     01/03/2022    K 3.9 01/03/2022     01/03/2022    CO2 25 01/03/2022    ANIONGAP 9 01/03/2022    BUN 7 01/03/2022    CREATININE 0.69 01/03/2022    BUNCRER 10 01/03/2022    CALCIUM 9.0 01/03/2022    LABGLOM >60 01/03/2022    GFRAA >60 01/03/2022    GFR      01/03/2022    GFR NOT REPORTED 01/03/2022     HFP:    Lab Results   Component Value Date    PROT 6.4 01/01/2022     CMP:    Lab Results   Component Value Date    GLUCOSE 100 01/03/2022     01/03/2022    K 3.9 01/03/2022     01/03/2022    CO2 25 01/03/2022    BUN 7 01/03/2022    CREATININE 0.69 01/03/2022    ANIONGAP 9 01/03/2022    ALKPHOS 81 01/01/2022    ALT 14 01/01/2022    AST 21 01/01/2022    BILITOT 0.32 01/01/2022    LABALBU 3.9 01/01/2022    ALBUMIN NOT REPORTED 01/01/2022    LABGLOM >60 01/03/2022    GFRAA >60 01/03/2022    GFR      01/03/2022    GFR NOT REPORTED 01/03/2022    PROT 6.4 01/01/2022    CALCIUM 9.0 01/03/2022     PT/INR:    Lab Results   Component Value Date    PROTIME 13.5 01/01/2022    INR 1.0 01/01/2022     PTT:   Lab Results   Component Value Date    APTT 138.0 01/01/2022     FLP:    Lab Results   Component Value Date    HDL 43 10/26/2021     U/A: Lab Results   Component Value Date    COLORU YELLOW 05/28/2015    TURBIDITY SLIGHTLY CLOUDY 05/28/2015    SPECGRAV 1.027 05/28/2015    HGBUR 3+ 05/28/2015    PHUR 5.5 05/28/2015    PROTEINU NEGATIVE 05/28/2015    GLUCOSEU NEGATIVE 05/28/2015    KETUA NEGATIVE 05/28/2015    BILIRUBINUR NEGATIVE 05/28/2015    UROBILINOGEN Normal 05/28/2015    NITRU NEGATIVE 05/28/2015    LEUKOCYTESUR NEGATIVE 05/28/2015     TSH:    Lab Results   Component Value Date    TSH 2.68 10/26/2021        Radiology:  CT CHEST PULMONARY EMBOLISM W CONTRAST    Result Date: 12/31/2021  1. Small emboli within right upper and left upper lobe segmental branches of the pulmonary arteries, without evidence of RV strain 2. Patchy airspace disease present bilaterally, most prominent within the left lower lobe, suggesting pneumonia and or atelectasis 3. Trace bilateral pleural effusions 4. Critical results were called by Dr. Ya Ruff to CHAIM Lee on 12/31/2021 at 9:44 p.m. hours. RECOMMENDATIONS: Unavailable   Echocardiogram:  Left ventricle is normal in size and wall thickness  Global LV SF normal with EF of 60%  No obvious wall motion abnormality  Mild TR  No significant pericardial effusion    Consultations:    Consults:     Final Specialist Recommendations/Findings:   IP CONSULT TO HOSPITALIST      The patient was seen and examined on day of discharge and this discharge summary is in conjunction with any daily progress note from day of discharge.     Discharge plan:     Disposition: Home    Physician Follow Up:   PCP within 1 week    Requiring Further Evaluation/Follow Up POST HOSPITALIZATION/Incidental Findings: Continue anticoagulation with Eliquis as directed and follow-up with PCP for continuation of therapy    Diet: regular diet    Activity: As tolerated    Instructions to Patient: Report back in ER if gets symptomatic again    Discharge Medications:      Medication List      START taking these medications    apixaban 5 MG Tabs tablet  Commonly known as: ELIQUIS  Take 2 tablets by mouth 2 times daily For 6 more days then 1 tablet 2 times daily till reevaluated by PCP in office     butalbital-acetaminophen-caffeine -40 MG per tablet  Commonly known as: FIORICET, ESGIC  Take 1 tablet by mouth every 4 hours as needed for Headaches     fluticasone 50 MCG/ACT nasal spray  Commonly known as: FLONASE  2 sprays by Each Nostril route daily  Start taking on: January 4, 2022     guaiFENesin 600 MG extended release tablet  Commonly known as: MUCINEX  Take 1 tablet by mouth 2 times daily for 7 days     lactobacillus Tabs  Take 1 tablet by mouth 3 times daily (with meals)     levoFLOXacin 500 MG tablet  Commonly known as: LEVAQUIN  Take 1 tablet by mouth daily for 7 days  Start taking on: January 4, 2022     vitamin D 1.25 MG (98226 UT) Caps capsule  Commonly known as: ERGOCALCIFEROL  Take 1 capsule by mouth once a week for 9 doses  Start taking on: January 9, 2022        Nona Maha taking these medications    CALCIUM SOFT CHEWS PO     DULoxetine 20 MG extended release capsule  Commonly known as: Cymbalta  Take 1 capsule by mouth daily     gabapentin 300 MG capsule  Commonly known as: Neurontin  Take 1 capsule by mouth 3 times daily for 20 days. nystatin 309679 UNIT/GM powder  Commonly known as: MYCOSTATIN  Apply 3 times daily.      ondansetron 4 MG disintegrating tablet  Commonly known as: ZOFRAN-ODT  Place 1 tablet under the tongue every 8 hours as needed for Nausea or Vomiting     pantoprazole 40 MG tablet  Commonly known as: PROTONIX  Take 1 tablet by mouth every morning (before breakfast)     senna-docusate 8.6-50 MG per tablet  Commonly known as: PERICOLACE  Take 2 tablets by mouth daily     therapeutic multivitamin-minerals tablet        STOP taking these medications    baclofen 10 MG tablet  Commonly known as: LIORESAL     cephALEXin 500 MG capsule  Commonly known as: Shyanne Campos           Where to Get Your Medications      These medications were sent to [facility] [address], [facility] -  [phone]  [address] [place] [address], [address] [place] [id number]    Phone: [phone]   · apixaban 5 MG Tabs tablet  · butalbital-acetaminophen-caffeine -40 MG per tablet  · fluticasone 50 MCG/ACT nasal spray  · guaiFENesin 600 MG extended release tablet  · lactobacillus Tabs  · levoFLOXacin 500 MG tablet  · vitamin D 1.25 MG (36526 UT) Caps capsule         No discharge procedures on file. Time Spent on discharge is  31 mins in patient examination, evaluation, counseling as well as medication reconciliation, prescriptions for required medications, discharge plan and follow up. Electronically signed by   Phoenix Green MD  1/3/2022  5:31 PM      Thank you Dr. Anthony Self MD for the opportunity to be involved in this patient's care.

## 2022-01-03 NOTE — PROGRESS NOTES
Pt alert, oriented and medically stable for discharge. Pt was given her eliquis script that was filled by outpatient pharmacy. Pt to  all other medications from [facility]. Pt verbalized need for follow up appointment, discharge medications, new meds, side effects and discharge instructions. Pt was wheeled out by [facility] to ER where son took patient home. Pt discharged with all belongings including clothing, medications, phone and .

## 2022-01-03 NOTE — PROGRESS NOTES
Occupational Therapy  DATE: 1/3/2022    NAME: Ayaan Fortune  MRN: [id number]   : 1970    Patient not seen this date for Occupational Therapy due to:      [] Cancel by RN or physician due to:    [] Hemodialysis    [] Critical Lab Value Level     [] Blood transfusion in progress    [] Acute or unstable cardiovascular status   _MAP < 55 or more than >115  _HR < 40 or > 130    [] Acute or unstable pulmonary status   -FiO2 > 60%   _RR < 5 or >40    _O2 sats < 85%    [] Strict Bedrest    [] Off Unit for surgery or procedure    [] Off Unit for testing       [] Pending imaging to R/O fracture    [] Refusal by Patient      [x] Other-1/3 PT reports pt has no OT needs; DC OT order      [] PT being discontinued at this time. Patient independent. No further needs. [] PT being discontinued at this time as the patient has been transferred to hospice care. No further needs.       Phi Rankin, OT

## 2022-01-06 LAB
CULTURE: NORMAL
CULTURE: NORMAL
Lab: NORMAL
Lab: NORMAL
SPECIMEN DESCRIPTION: NORMAL
SPECIMEN DESCRIPTION: NORMAL

## 2022-01-10 ENCOUNTER — CARE COORDINATION (OUTPATIENT)
Dept: OTHER | Facility: CLINIC | Age: 52
End: 2022-01-10

## 2022-01-10 NOTE — CARE COORDINATION
Ambulatory Care Coordination Note  1/10/2022  CM Risk Score: 0  Charlson 10 Year Mortality Risk Score: 2%     ACC: Octavio Donovan, RN    Summary Note: ACM attempted to reach patient for introduction to Associate Care Management related to admission on 12/31/2021-1/3/2022 for PE. HIPAA compliant message left requesting a return phone call. Will attempt to outreach patient again.        Future Appointments   Date Time Provider Teresa Ortiz   1/19/2022  8:45 AM Duglas Vicente MD [facility]

## 2022-01-11 ENCOUNTER — CARE COORDINATION (OUTPATIENT)
Dept: OTHER | Facility: CLINIC | Age: 52
End: 2022-01-11

## 2022-01-11 NOTE — CARE COORDINATION
Ambulatory Care Coordination Note  2022  CM Risk Score: 0  Charlson 10 Year Mortality Risk Score: 2%     ACC: Ángel Chen RN    Summary Note: [facility]) contacted the patient by telephone to introduced the Associate Care Management Program r/t admission on 2021-1/3/2022 for bilateral PE. Verified name and  with patient as identifiers. Patient was agreeable to enroll; ACM reviewed and updated CC Protocol, medications, goals, education and disease specific assessment. Patient states she went to urgent care on 2021 after several days of worsening shortness of breath and chest tightness. Patient was sent to ER to r/o PE. Patient had CT that showed pneumonia and bilateral PE. Patient is covid negative. Patient has had 2 vaccinates for COVID. Patient was prescribed Eliquis and will continue levaquin. Patient states she is doing ok but having some chest tightness again today. Patient is back to work today and has noticed it while working. States she is also wearing an abdominal binder and wore it for a long period of time yesterday so not sure if she has it too tight and its causing the chest tightness. Patient had tummy tuck in November. Patient also c/o cough. Patient states she is taking Eliquis and will probably be on it for 3 months. Patient will finish levaquin tonight. Discussed with patient if she notices the chest tightness continue with abdominal binder loosened or with worsening symptoms, she should contact her PCP regarding this or go to ED if needed. Patient verbalized understanding. Patient states she has not made a f/u appt with PCP yet. Provided Education:  Discussed red flags and appropriate site of care based on symptoms and resources available to patient including: PCP, Specialist, Benefits related nurse triage line, Urgent care clinics, [place], When to call 911 and [address]. Importance and benefits of:  Follow up with PCP and specialist, medication adherence, self monitoring and reporting of symptoms. Plan:  Continue weekly outreaches to provide telephonic support, education and resources as needed. Discuss / follow up on: Goal progress and chest tightness. Pt verbalized understanding and is agreeable to follow up contact. Ambulatory Care Coordination Assessment    Care Coordination Protocol  Program Enrollment: Rising Risk  Referral from Primary Care Provider: No  Week 1 - Initial Assessment     Do you have all of your prescriptions and are they filled?: Yes  Barriers to medication adherence: None  Are you able to afford your medications?: Yes  How often do you have trouble taking your medications the way you have been told to take them?: I always take them as prescribed. Do you have Home O2 Therapy?: No      Ability to seek help/take action for Emergent Urgent situations i.e. fire, crime, inclement weather or health crisis. : Independent  Ability to ambulate to restroom: Independent  Ability handle personal hygeine needs (bathing/dressing/grooming): Independent  Ability to manage Medications: Independent  Ability to prepare Food Preparation: Independent  Ability to maintain home (clean home, laundry): Independent  Ability to drive and/or has transportation: Independent  Ability to do shopping: Independent  Ability to manage finances: Independent  Is patient able to live independently?: Yes     Current Housing: Private Residence                 Suggested Interventions and Community Resources                  Prior to Admission medications    Medication Sig Start Date End Date Taking?  Authorizing Provider   apixaban (ELIQUIS) 5 MG TABS tablet Take 2 tablets by mouth 2 times daily For 6 more days then 1 tablet 2 times daily till reevaluated by PCP in office 1/3/22  Yes Elizabeth Gutierrez MD   fluticasone [facility]) 50 MCG/ACT nasal spray 2 sprays by Each Nostril route daily 1/4/22  Yes Elizabeth Gutierrez MD levoFLOXacin (LEVAQUIN) 500 MG tablet Take 1 tablet by mouth daily for 7 days 1/4/22 1/11/22 Yes Elizabeth Gutierrez MD   vitamin D (ERGOCALCIFEROL) 1.25 MG (63069 UT) CAPS capsule Take 1 capsule by mouth once a week for 9 doses 1/9/22 3/7/22 Yes Elizabeth Gutierrez MD   pantoprazole (PROTONIX) 40 MG tablet Take 1 tablet by mouth every morning (before breakfast) 9/14/21  Yes Gladis Steel MD   Calcium-Vitamin D-Vitamin K (CALCIUM SOFT CHEWS PO) Take by mouth 3 times daily   Yes Historical Provider, MD   Multiple Vitamins-Minerals (THERAPEUTIC MULTIVITAMIN-MINERALS) tablet Take 1 tablet by mouth daily   Yes Historical Provider, MD   butalbital-acetaminophen-caffeine (FIORICET, ESGIC) -40 MG per tablet Take 1 tablet by mouth every 4 hours as needed for Headaches 1/3/22   Elizabeth Gutierrez MD   lactobacillus (BACID) TABS Take 1 tablet by mouth 3 times daily (with meals) 1/3/22   Elizabeth Gutierrez MD   gabapentin (NEURONTIN) 300 MG capsule Take 1 capsule by mouth 3 times daily for 20 days. 11/4/21 11/24/21  Yusuf Hidalgo MD   ondansetron (ZOFRAN-ODT) 4 MG disintegrating tablet Place 1 tablet under the tongue every 8 hours as needed for Nausea or Vomiting 6/16/21   Elisabeth Fraser MD   DULoxetine (CYMBALTA) 20 MG extended release capsule Take 1 capsule by mouth daily 6/16/21   Elisabeth Fraser MD   nystatin (MYCOSTATIN) 905136 UNIT/GM powder Apply 3 times daily.  6/16/21   Elisabeth Fraser MD   senna-docusate (PERICOLACE) 8.6-50 MG per tablet Take 2 tablets by mouth daily 2/10/21   Gladis Steel MD       Future Appointments   Date Time Provider Teresa Ortiz   1/19/2022  8:45 AM Yusuf Hidalgo MD [facility]ie Mandel

## 2022-01-18 ENCOUNTER — CARE COORDINATION (OUTPATIENT)
Dept: OTHER | Facility: CLINIC | Age: 52
End: 2022-01-18

## 2022-01-19 ENCOUNTER — HOSPITAL ENCOUNTER (EMERGENCY)
Age: 52
Discharge: HOME | End: 2022-01-19
Attending: EMERGENCY MEDICINE
Payer: COMMERCIAL

## 2022-01-19 ENCOUNTER — APPOINTMENT (OUTPATIENT)
Dept: GENERAL RADIOLOGY | Age: 52
End: 2022-01-19
Payer: COMMERCIAL

## 2022-01-19 VITALS
HEIGHT: 66 IN | TEMPERATURE: 98.4 F | BODY MASS INDEX: 26.03 KG/M2 | DIASTOLIC BLOOD PRESSURE: 74 MMHG | WEIGHT: 162 LBS | RESPIRATION RATE: 15 BRPM | OXYGEN SATURATION: 97 % | SYSTOLIC BLOOD PRESSURE: 104 MMHG | HEART RATE: 63 BPM

## 2022-01-19 DIAGNOSIS — R07.9 CHEST PAIN, UNSPECIFIED TYPE: Primary | ICD-10-CM

## 2022-01-19 LAB
ABSOLUTE EOS #: 0.38 K/UL (ref 0–0.44)
ABSOLUTE IMMATURE GRANULOCYTE: 0.01 K/UL (ref 0–0.3)
ABSOLUTE LYMPH #: 2.55 K/UL (ref 1.1–3.7)
ABSOLUTE MONO #: 0.4 K/UL (ref 0.1–1.2)
ALBUMIN SERPL-MCNC: 4.4 G/DL (ref 3.5–5.2)
ALBUMIN/GLOBULIN RATIO: ABNORMAL (ref 1–2.5)
ALP BLD-CCNC: 75 U/L (ref 35–104)
ALT SERPL-CCNC: 11 U/L (ref 5–33)
ANION GAP SERPL CALCULATED.3IONS-SCNC: 12 MMOL/L (ref 9–17)
AST SERPL-CCNC: 17 U/L
BASOPHILS # BLD: 1 % (ref 0–2)
BASOPHILS ABSOLUTE: 0.05 K/UL (ref 0–0.2)
BILIRUB SERPL-MCNC: 0.2 MG/DL (ref 0.3–1.2)
BUN BLDV-MCNC: 9 MG/DL (ref 6–20)
BUN/CREAT BLD: 13 (ref 9–20)
CALCIUM SERPL-MCNC: 8.9 MG/DL (ref 8.6–10.4)
CHLORIDE BLD-SCNC: 107 MMOL/L (ref 98–107)
CO2: 23 MMOL/L (ref 20–31)
CREAT SERPL-MCNC: 0.69 MG/DL (ref 0.5–0.9)
DIFFERENTIAL TYPE: ABNORMAL
EOSINOPHILS RELATIVE PERCENT: 6 % (ref 1–4)
GFR AFRICAN AMERICAN: >60 ML/MIN
GFR NON-AFRICAN AMERICAN: >60 ML/MIN
GFR SERPL CREATININE-BSD FRML MDRD: ABNORMAL ML/MIN/{1.73_M2}
GFR SERPL CREATININE-BSD FRML MDRD: ABNORMAL ML/MIN/{1.73_M2}
GLUCOSE BLD-MCNC: 119 MG/DL (ref 70–99)
HCT VFR BLD CALC: 40.6 % (ref 36.3–47.1)
HEMOGLOBIN: 13 G/DL (ref 11.9–15.1)
IMMATURE GRANULOCYTES: 0 %
LYMPHOCYTES # BLD: 37 % (ref 24–43)
MCH RBC QN AUTO: 27.5 PG (ref 25.2–33.5)
MCHC RBC AUTO-ENTMCNC: 32 G/DL (ref 28.4–34.8)
MCV RBC AUTO: 86 FL (ref 82.6–102.9)
MONOCYTES # BLD: 6 % (ref 3–12)
MYOGLOBIN: <21 NG/ML (ref 25–58)
MYOGLOBIN: <21 NG/ML (ref 25–58)
NRBC AUTOMATED: 0 PER 100 WBC
PDW BLD-RTO: 12.9 % (ref 11.8–14.4)
PLATELET # BLD: 260 K/UL (ref 138–453)
PLATELET ESTIMATE: ABNORMAL
PMV BLD AUTO: 9.5 FL (ref 8.1–13.5)
POTASSIUM SERPL-SCNC: 3.8 MMOL/L (ref 3.7–5.3)
RBC # BLD: 4.72 M/UL (ref 3.95–5.11)
RBC # BLD: ABNORMAL 10*6/UL
SEG NEUTROPHILS: 50 % (ref 36–65)
SEGMENTED NEUTROPHILS ABSOLUTE COUNT: 3.5 K/UL (ref 1.5–8.1)
SODIUM BLD-SCNC: 142 MMOL/L (ref 135–144)
TOTAL PROTEIN: 7 G/DL (ref 6.4–8.3)
TROPONIN INTERP: ABNORMAL
TROPONIN INTERP: ABNORMAL
TROPONIN T: ABNORMAL NG/ML
TROPONIN T: ABNORMAL NG/ML
TROPONIN, HIGH SENSITIVITY: <6 NG/L (ref 0–14)
TROPONIN, HIGH SENSITIVITY: <6 NG/L (ref 0–14)
WBC # BLD: 6.9 K/UL (ref 3.5–11.3)
WBC # BLD: ABNORMAL 10*3/UL

## 2022-01-19 PROCEDURE — 93005 ELECTROCARDIOGRAM TRACING: CPT | Performed by: EMERGENCY MEDICINE

## 2022-01-19 PROCEDURE — 84484 ASSAY OF TROPONIN QUANT: CPT

## 2022-01-19 PROCEDURE — 99282 EMERGENCY DEPT VISIT SF MDM: CPT

## 2022-01-19 PROCEDURE — 71045 X-RAY EXAM CHEST 1 VIEW: CPT

## 2022-01-19 PROCEDURE — 80053 COMPREHEN METABOLIC PANEL: CPT

## 2022-01-19 PROCEDURE — 83874 ASSAY OF MYOGLOBIN: CPT

## 2022-01-19 PROCEDURE — 85025 COMPLETE CBC W/AUTO DIFF WBC: CPT

## 2022-01-19 ASSESSMENT — PAIN SCALES - GENERAL: PAINLEVEL_OUTOF10: 4

## 2022-01-20 ENCOUNTER — CARE COORDINATION (OUTPATIENT)
Dept: OTHER | Facility: CLINIC | Age: 52
End: 2022-01-20

## 2022-01-20 LAB
EKG ATRIAL RATE: 73 BPM
EKG P AXIS: 56 DEGREES
EKG P-R INTERVAL: 124 MS
EKG Q-T INTERVAL: 394 MS
EKG QRS DURATION: 80 MS
EKG QTC CALCULATION (BAZETT): 434 MS
EKG R AXIS: 66 DEGREES
EKG T AXIS: 61 DEGREES
EKG VENTRICULAR RATE: 73 BPM

## 2022-01-20 PROCEDURE — 93010 ELECTROCARDIOGRAM REPORT: CPT | Performed by: INTERNAL MEDICINE

## 2022-01-20 NOTE — ED PROVIDER NOTES
This visit was performed by both a physician and an APC. I personally evaluated and examined the patient. I performed all aspects of the MDM as documented. 2 sets of cardiac markers are negative. I have no clinical suspicion of pulmonary embolism.      Worth Osgood, MD  01/19/22 2031

## 2022-01-20 NOTE — ED PROVIDER NOTES
[address] [place] [address] ED  [facility]      Pt Name: Dante Etienne  MRN: [id number]  [place] 1970  Date ofevaluation: 1/19/2022  Provider: Kyaw Clarke Dr       Chief Complaint   Patient presents with    Chest Pain     Hx of PEs 12/31; taking blood thinners; Covid end of December; no previous cardiac hx          HISTORY OF PRESENT ILLNESS  (Location/Symptom, Timing/Onset, Context/Setting, Quality, Duration, Modifying Factors, Severity.)   Dante Etienne is a 54 y.o. female who presents to the emergency department with chest pain. Patient concerned because she was diagnosed with a small PE and placed on blood thinners on New Year's Ainsley. States that she has taken her blood thinner every single day. She may have missed a morning or evening dose a couple times but she has always taken it at least once a day with the exception of 1 or 2 days. .  Denies any fevers or chills. No nausea or vomiting. No diaphoresis. No other complaints. Denies any shortness of breath. Pain present the last couple days. Nursing Notes were reviewed.     ALLERGIES     Nsaids, Hydrocodone-acetaminophen, Oxycodone-acetaminophen, and Sulfamethoxazole-trimethoprim    CURRENT MEDICATIONS       Discharge Medication List as of 1/19/2022  8:26 PM      CONTINUE these medications which have NOT CHANGED    Details   apixaban (ELIQUIS) 5 MG TABS tablet Take 2 tablets by mouth 2 times daily For 6 more days then 1 tablet 2 times daily till reevaluated by PCP in office, Disp-60 tablet, R-1Normal      butalbital-acetaminophen-caffeine (FIORICET, ESGIC) -40 MG per tablet Take 1 tablet by mouth every 4 hours as needed for Headaches, Disp-15 tablet, R-0Normal      lactobacillus (BACID) TABS Take 1 tablet by mouth 3 times daily (with meals), Disp-90 tablet, R-0Normal      fluticasone (FLONASE) 50 MCG/ACT nasal spray 2 sprays by Each Nostril route daily, Disp-16 g, R-3Normal      vitamin D (ERGOCALCIFEROL) 1.25 MG (66137 UT) CAPS capsule Take 1 capsule by mouth once a week for 9 doses, Disp-5 capsule, R-0Normal      gabapentin (NEURONTIN) 300 MG capsule Take 1 capsule by mouth 3 times daily for 20 days. , Disp-60 capsule, R-0Normal      pantoprazole (PROTONIX) 40 MG tablet Take 1 tablet by mouth every morning (before breakfast), Disp-90 tablet, R-5Normal      ondansetron (ZOFRAN-ODT) 4 MG disintegrating tablet Place 1 tablet under the tongue every 8 hours as needed for Nausea or Vomiting, Disp-90 tablet, R-3Normal      DULoxetine (CYMBALTA) 20 MG extended release capsule Take 1 capsule by mouth daily, Disp-30 capsule, R-3Normal      nystatin (MYCOSTATIN) 295416 UNIT/GM powder Apply 3 times daily. , Disp-60 g, R-3, Normal      Calcium-Vitamin D-Vitamin K (CALCIUM SOFT CHEWS PO) Take by mouth 3 times dailyHistorical Med      Multiple Vitamins-Minerals (THERAPEUTIC MULTIVITAMIN-MINERALS) tablet Take 1 tablet by mouth dailyHistorical Med      senna-docusate (PERICOLACE) 8.6-50 MG per tablet Take 2 tablets by mouth daily, Disp-180 tablet, R-3Normal             PAST MEDICAL HISTORY         Diagnosis Date    Abnormal findings on esophagogastroduodenoscopy (EGD) 06/12/2020    Gastritis, tiny hiatal hernia, candy cane deformity of gastrojejunostomy    Arthritis     Chronic constipation     Gastritis     GERD (gastroesophageal reflux disease)     H. pylori infection     Headache(784.0)     Hiatal hernia     Tiny per EGD 6-12-20    Prolonged emergence from general anesthesia     Sleep apnea     Pt states has resolved since gastric bypass surgery.         SURGICAL HISTORY           Procedure Laterality Date    ABDOMINOPLASTY N/A 11/4/2021    ABDOMINOPLASTY WITH BIOPATCH AROUND PANTERA DRAIN AND INCISIONAL WOUND VAC WITH PRE OP TAP BLOCK performed by Amaya Melvin MD at [facility]  11/1991    COLONOSCOPY      COLONOSCOPY N/A 3/10/2021    COLONOSCOPY DIAGNOSTIC performed by Vera Mederos Nori Mejia MD at [facility]  2021    PANNICULECTOMY (N/A Abdomen)     COSMETIC SURGERY  2021    ABDOMINOPLASTY WITH BIOPATCH AROUND PANTERA DRAIN AND INCISIONAL WOUND VAC     ENDOSCOPY, COLON, DIAGNOSTIC  2020    GASTRIC BYPASS SURGERY  2016    Roberto-en-Y    HYSTERECTOMY      LAPAROSCOPY      LEEP      LIPECTOMY N/A 2021    PANNICULECTOMY performed by [facility]vin MD at [facility] TUBAL LIGATION           FAMILY HISTORY           Problem Relation Age of Onset    Cancer Mother 64        ovarian cancer     No Known Problems Father      Family Status   Relation Name Status    Mother      Father  (Not Specified)        SOCIAL HISTORY      reports that she quit smoking about 9 years ago. She has never used smokeless tobacco. She reports previous alcohol use. She reports that she does not use drugs. REVIEW OFSYSTEMS    (2-9 systems for level 4, 10 or more for level 5)   Review of Systems    Except as noted above the remainder of the review of systems was reviewed and negative. PHYSICAL EXAM    (up to 7 for level 4, 8 or more for level 5)     ED Triage Vitals [22 1706]   BP Temp Temp src Pulse Resp SpO2 Height Weight   127/88 98.4 °F (36.9 °C) -- 74 18 98 % 5' 6\" (1.676 m) 162 lb (73.5 kg)      Physical Exam  Constitutional:       Appearance: She is well-developed. HENT:      Head: Normocephalic and atraumatic. Cardiovascular:      Rate and Rhythm: Normal rate and regular rhythm. Pulmonary:      Effort: Pulmonary effort is normal.      Breath sounds: Normal breath sounds. Abdominal:      Palpations: Abdomen is soft. Musculoskeletal:         General: Normal range of motion. Cervical back: Normal range of motion and neck supple. Skin:     General: Skin is warm. Findings: No rash. Neurological:      Mental Status: She is alert and oriented to person, place, and time.    Psychiatric: Behavior: Behavior normal.                 DIAGNOSTIC RESULTS     EKG: All EKG's are interpreted by the Emergency Department Physician who either signs or Co-signs this chart in the absence of a cardiologist.    EKG interpreted by ED physician. Time 1710.  73 beats a minute. Normal sinus rhythm. No ST segment elevation. RADIOLOGY:   Non-plain film images such as CT, Ultrasound and MRI are read by the radiologist. Plain radiographic images arevisualized and preliminarily interpreted by the emergency physician with the below findings:        Interpretation per the Radiologist below, if available at thetime of this note:          ED BEDSIDE ULTRASOUND:   Performed by ED Physician - none    LABS:  Labs Reviewed   CBC WITH AUTO DIFFERENTIAL - Abnormal; Notable for the following components:       Result Value    Eosinophils % 6 (*)     All other components within normal limits   COMPREHENSIVE METABOLIC PANEL - Abnormal; Notable for the following components:    Glucose 119 (*)     Total Bilirubin 0.20 (*)     All other components within normal limits   TROP/MYOGLOBIN - Abnormal; Notable for the following components:    Myoglobin <21 (*)     All other components within normal limits   TROP/MYOGLOBIN - Abnormal; Notable for the following components:    Myoglobin <21 (*)     All other components within normal limits   TROP/MYOGLOBIN       All other labs were within normal range or not returned as of this dictation. EMERGENCY DEPARTMENT COURSE and DIFFERENTIAL DIAGNOSIS/MDM:   Vitals:    Vitals:    01/19/22 1745 01/19/22 1900 01/19/22 1945 01/19/22 2000   BP: 112/75 105/83 108/77 104/74   Pulse: 67 64 61 63   Resp: 23 18 21 15   Temp:       SpO2: 95% 98% 98% 97%   Weight:       Height:         Work-up is negative. Patient will be discharged home. Both sets of cardiac enzymes are negative. CONSULTS:  None    PROCEDURES:  Procedures        FINAL IMPRESSION      1.  Chest pain, unspecified type DISPOSITION/PLAN   DISPOSITION Decision To Discharge 01/19/2022 08:17:49 PM      PATIENTREFERRED TO:   Milagros Anjel, [address] [id number] [phone]    In 3 days        DISCHARGE MEDICATIONS:     Discharge Medication List as of 1/19/2022  8:26 PM              (Please note that portions of this note were completed with a voice recognition program.  Efforts were made to edit thedictations but occasionally words are mis-transcribed.)    ENMANUEL Hensley PA-C  01/19/22 2111

## 2022-01-21 ENCOUNTER — CARE COORDINATION (OUTPATIENT)
Dept: OTHER | Facility: CLINIC | Age: 52
End: 2022-01-21

## 2022-01-21 NOTE — CARE COORDINATION
Ambulatory Care Coordination Note  1/21/2022  CM Risk Score: 0  Charlson 10 Year Mortality Risk Score: 2%     ACC: Collette Gaitan, TITO    Summary Note: ACM attempted to reach patient for follow up call. HIPAA compliant message left requesting a return phone call at patients convenience. This is the final attempt. Final UTR letter sent via [employer]. No further outreach scheduled with this ACM, ACM will sign off care team at this time. Episode of Care resolved. Patient has this ACM's contact information if future needs arise.       Future Appointments   Date Time Provider Teresa Ortiz   2/2/2022  8:45 AM Shawn Malik MD [facility]

## 2022-01-27 LAB
EKG ATRIAL RATE: 84 BPM
EKG P AXIS: 54 DEGREES
EKG P-R INTERVAL: 118 MS
EKG Q-T INTERVAL: 374 MS
EKG QRS DURATION: 80 MS
EKG QTC CALCULATION (BAZETT): 441 MS
EKG R AXIS: 40 DEGREES
EKG T AXIS: 34 DEGREES
EKG VENTRICULAR RATE: 84 BPM

## 2022-02-02 ENCOUNTER — OFFICE VISIT (OUTPATIENT)
Dept: SURGERY | Age: 52
End: 2022-02-02

## 2022-02-02 VITALS — OXYGEN SATURATION: 100 % | HEIGHT: 66 IN | BODY MASS INDEX: 26.03 KG/M2 | WEIGHT: 162 LBS | RESPIRATION RATE: 12 BRPM

## 2022-02-02 DIAGNOSIS — Z09 POSTOPERATIVE EXAMINATION: Primary | ICD-10-CM

## 2022-02-02 PROCEDURE — 99024 POSTOP FOLLOW-UP VISIT: CPT | Performed by: PLASTIC SURGERY

## 2022-02-02 NOTE — PROGRESS NOTES
[facility]  [address] [place] [address],[address]  [facility] [address]       OFFICE POST-OP NOTE    Patient Name:  Bharath Adkins    :  1970    MRN:  [id number]  STATUS POST  Chief Complaint   Patient presents with    Post-Op Check     panniculectommy DOS 21       SUBJECTIVE  Patient seen and examined. S/p panniculectomy and abdominoplasty. The pannus weight 7.156 pounds. The pathology was consistent with skin and subcutaneous tissue. Patient is very happy with the results. PHYSICAL EXAM  Vital Signs:  Resp 12   Ht 5' 6\" (1.676 m)   Wt 162 lb (73.5 kg)   SpO2 100%   BMI 26.15 kg/m²     Incisions:  Suture line clean dry and intact. Patient has healed well. Skin:  No evidence of infection. Neurologic:  Alert & oriented x 3. Media Information               Lab:  Surgical Pathology Report -- Diagnosis --   SKIN AND SUBCUTIS, PANNICULECTOMY:     BENIGN SKIN AND SUBCUTIS       Jeny Castro. Rico Byrne D.O.   **Electronically Signed Out**         [facility]/2021         Clinical Information   Pre-op Diagnosis:  SYMPTOMATIC PANNICULUS, RECTUS DIASTASIS   Operative Findings:  PANNUS   Operation Performed:  PANNICULECTOMY, ABDOMINOPLASTY WITH BIOPATCH   AROUND PANTERA DRAIN AND INCISIONAL WOUND VAC WITH PRE-OP TAP BLOCK AND 1ST   ASSIST     Source of Specimen   1: PANNUS     Gross Description   \"[place], PANNUS\" 337.76 grams, 28.5 x 28.3 x 9.5 cm aggregate   of multiple irregular fragments of multilobulated tan fibrofatty   tissue, the largest two of which are partially surfaced by wrinkled   tan, unremarkable showing no definitive mass or pigmented lesions. Sectioning reveals minimal (< 10%) rubbery white unremarkable fibrous   tissue interspersed with tan unremarkable fatty tissue.  No definitive   mass lesion, hemorrhage or necrosis is identified.  Representative   sections 1cs.  tm       Microscopic Description   Microscopic examination performed. SURGICAL PATHOLOGY CONSULTATION         Patient Name: Antonio Bynum [facility] Rec: [id number]   Path Number: [id number]     [facility]   ANATOMIC PATHOLOGY   [address] [place] [address], [facility] [address]   [phone]   Fax: [phone]          ASSESSMENT   Diagnosis Orders   1. Postoperative examination         PLAN  Recommend compression as needed. Patient may start exercising I discussed with patient that she needs to start slow. She needs to listen to her body if it hurts patient should avoid that activity. Patient is to follow-up in 3 months    Plan discussed with patient.     Electronically signed by:  Serenity Tucker M.D.   2/2/2022

## 2022-03-31 ENCOUNTER — OFFICE VISIT (OUTPATIENT)
Dept: INTERNAL MEDICINE CLINIC | Age: 52
End: 2022-03-31
Payer: COMMERCIAL

## 2022-03-31 VITALS — SYSTOLIC BLOOD PRESSURE: 120 MMHG | DIASTOLIC BLOOD PRESSURE: 64 MMHG

## 2022-03-31 DIAGNOSIS — Z98.84 HX OF BARIATRIC SURGERY: ICD-10-CM

## 2022-03-31 DIAGNOSIS — J32.9 CHRONIC SINUSITIS, UNSPECIFIED LOCATION: ICD-10-CM

## 2022-03-31 DIAGNOSIS — F32.A ANXIETY AND DEPRESSION: ICD-10-CM

## 2022-03-31 DIAGNOSIS — I26.99 OTHER ACUTE PULMONARY EMBOLISM WITHOUT ACUTE COR PULMONALE (HCC): Primary | ICD-10-CM

## 2022-03-31 DIAGNOSIS — E55.9 VITAMIN D DEFICIENCY: ICD-10-CM

## 2022-03-31 DIAGNOSIS — F41.9 ANXIETY AND DEPRESSION: ICD-10-CM

## 2022-03-31 DIAGNOSIS — R11.0 NAUSEA: ICD-10-CM

## 2022-03-31 PROBLEM — L30.4 INTERTRIGO: Status: RESOLVED | Noted: 2021-06-16 | Resolved: 2022-03-31

## 2022-03-31 PROCEDURE — G8427 DOCREV CUR MEDS BY ELIG CLIN: HCPCS | Performed by: NURSE PRACTITIONER

## 2022-03-31 PROCEDURE — 99214 OFFICE O/P EST MOD 30 MIN: CPT | Performed by: NURSE PRACTITIONER

## 2022-03-31 PROCEDURE — 1036F TOBACCO NON-USER: CPT | Performed by: NURSE PRACTITIONER

## 2022-03-31 PROCEDURE — G8484 FLU IMMUNIZE NO ADMIN: HCPCS | Performed by: NURSE PRACTITIONER

## 2022-03-31 PROCEDURE — G8419 CALC BMI OUT NRM PARAM NOF/U: HCPCS | Performed by: NURSE PRACTITIONER

## 2022-03-31 RX ORDER — ONDANSETRON 4 MG/1
4 TABLET, ORALLY DISINTEGRATING ORAL EVERY 8 HOURS PRN
Qty: 90 TABLET | Refills: 1 | Status: SHIPPED | OUTPATIENT
Start: 2022-03-31

## 2022-03-31 RX ORDER — ERGOCALCIFEROL 1.25 MG/1
50000 CAPSULE ORAL WEEKLY
Qty: 12 CAPSULE | Refills: 0 | Status: SHIPPED | OUTPATIENT
Start: 2022-03-31 | End: 2022-06-17

## 2022-03-31 SDOH — ECONOMIC STABILITY: TRANSPORTATION INSECURITY
IN THE PAST 12 MONTHS, HAS LACK OF TRANSPORTATION KEPT YOU FROM MEETINGS, WORK, OR FROM GETTING THINGS NEEDED FOR DAILY LIVING?: NO

## 2022-03-31 SDOH — ECONOMIC STABILITY: FOOD INSECURITY: WITHIN THE PAST 12 MONTHS, YOU WORRIED THAT YOUR FOOD WOULD RUN OUT BEFORE YOU GOT MONEY TO BUY MORE.: NEVER TRUE

## 2022-03-31 SDOH — ECONOMIC STABILITY: TRANSPORTATION INSECURITY
IN THE PAST 12 MONTHS, HAS THE LACK OF TRANSPORTATION KEPT YOU FROM MEDICAL APPOINTMENTS OR FROM GETTING MEDICATIONS?: NO

## 2022-03-31 SDOH — ECONOMIC STABILITY: FOOD INSECURITY: WITHIN THE PAST 12 MONTHS, THE FOOD YOU BOUGHT JUST DIDN'T LAST AND YOU DIDN'T HAVE MONEY TO GET MORE.: NEVER TRUE

## 2022-03-31 ASSESSMENT — ENCOUNTER SYMPTOMS
DIARRHEA: 1
WHEEZING: 0
CONSTIPATION: 1
SHORTNESS OF BREATH: 0
COUGH: 0

## 2022-03-31 ASSESSMENT — SOCIAL DETERMINANTS OF HEALTH (SDOH): HOW HARD IS IT FOR YOU TO PAY FOR THE VERY BASICS LIKE FOOD, HOUSING, MEDICAL CARE, AND HEATING?: NOT HARD AT ALL

## 2022-03-31 NOTE — PROGRESS NOTES
Visit Information    Have you changed or started any medications since your last visit including any over-the-counter medicines, vitamins, or herbal medicines? no   Are you having any side effects from any of your medications? -  no  Have you stopped taking any of your medications? Is so, why? -  no    Have you seen any other physician or provider since your last visit? No  Have you had any other diagnostic tests since your last visit? Yes - Records Obtained  Have you been seen in the emergency room and/or had an admission to a hospital since we last saw you? Yes - Records Obtained  Have you had your routine dental cleaning in the past 6 months? yes -     Have you activated your [facility] account? If not, what are your barriers?  Yes     Patient Care Team:  Toney Moore MD as PCP - General (Internal Medicine)  Toney Moore MD as PCP - [facility]    Medical History Review  Past Medical, Family, and Social History reviewed and does contribute to the patient presenting condition    Health Maintenance   Topic Date Due    Hepatitis C screen  Never done    HIV screen  Never done    DTaP/Tdap/Td vaccine (1 - Tdap) Never done    COVID-19 Vaccine (3 - Booster for Loy Sax series) 05/21/2022    Depression Monitoring  06/16/2022    Diabetes screen  10/26/2024    Lipid screen  10/26/2026    Colorectal Cancer Screen  03/10/2031    Flu vaccine  Completed    Hepatitis A vaccine  Aged Out    Hepatitis B vaccine  Aged Out    Hib vaccine  Aged Out    Meningococcal (ACWY) vaccine  Aged Out    Pneumococcal 0-64 years Vaccine  Aged Out         [address] [address]  Dept: [phone]  Dept Fax: [phone]    Office Progress/Follow Up Note  Date of patient's visit: 3/31/2022   Patient's Name:  Arlen Ames  YOB: 1970            Patient Care Team:  Toney Moore MD as PCP - General (Internal Medicine)  Toney Moore MD as PCP - [facility]    REASON FOR VISIT: Pneumonia (Admitted to the hospital in December 2021, had blood clots. Currently taking Eliquis, would like to know for how long)        HISTORY OF PRESENT ILLNESS:      History was obtained from the patient. Sanders Goldmann is a 54 y.o. is here for Pneumonia (Admitted to the hospital in December 2021, had blood clots. Currently taking Eliquis, would like to know for how long)    Patient is here with multiple medical problems, including follow-up from hospital stay at [facility] 12/31 through 1/3 for pneumonia related to Covid, bilateral PE. She remains on Eliquis, prior history of DVT. She returned to the ER on 1/19 for chest pain, EKG showed normal sinus rhythm, chest x-ray was clear, troponins negative. She has history of anxiety and depression, was previously prescribed Cymbalta but has not been taking it. Has a history of gastric bypass, diarrhea, nausea, is asking if her PCP can monitor her yearly labs versus establishing with a new gastric doctor here. Is also requesting referral to ENT for chronic sinus issues.   Patient Active Problem List   Diagnosis    Hx of bariatric surgery    Anxiety    Nausea    Pulmonary embolism without acute cor pulmonale (HCC)    Vitamin D deficiency    Pneumonia due to infectious organism    Human metapneumovirus pneumonia    Coronavirus -[address]       Allergies   Allergen Reactions    Nsaids     Hydrocodone-Acetaminophen Nausea Only    Oxycodone-Acetaminophen Nausea Only    Sulfamethoxazole-Trimethoprim Hives and Rash         MEDICATIONS:     Current Outpatient Medications   Medication Sig Dispense Refill    vitamin D (ERGOCALCIFEROL) 1.25 MG (53057 UT) CAPS capsule Take 1 capsule by mouth once a week for 12 doses 12 capsule 0    ondansetron (ZOFRAN-ODT) 4 MG disintegrating tablet Place 1 tablet under the tongue every 8 hours as needed for Nausea 90 tablet 1    apixaban (ELIQUIS) 5 MG TABS tablet Take 2 tablets by mouth 2 times daily For 6 more days then 1 tablet 2 times daily till reevaluated by PCP in office 60 tablet 1    butalbital-acetaminophen-caffeine (FIORICET, ESGIC) -40 MG per tablet Take 1 tablet by mouth every 4 hours as needed for Headaches 15 tablet 0    fluticasone (FLONASE) 50 MCG/ACT nasal spray 2 sprays by Each Nostril route daily 16 g 3    pantoprazole (PROTONIX) 40 MG tablet Take 1 tablet by mouth every morning (before breakfast) 90 tablet 5    DULoxetine (CYMBALTA) 20 MG extended release capsule Take 1 capsule by mouth daily 30 capsule 3    Calcium-Vitamin D-Vitamin K (CALCIUM SOFT CHEWS PO) Take by mouth 3 times daily      senna-docusate (PERICOLACE) 8.6-50 MG per tablet Take 2 tablets by mouth daily 180 tablet 3    lactobacillus (BACID) TABS Take 1 tablet by mouth 3 times daily (with meals) (Patient not taking: Reported on 3/31/2022) 90 tablet 0     No current facility-administered medications for this visit. SOCIAL HISTORY    Reviewed and updated. Andre  reports that she quit smoking about 9 years ago. She has never used smokeless tobacco.    FAMILY HISTORY:    Reviewed and updated. family history includes Cancer (age of onset: 64) in her mother; Heart Disease in her father; High Cholesterol in her brother, father, and sister; Hypertension in her brother, father, and sister. REVIEW OF SYSTEMS:      Review of Systems   Constitutional: Negative for chills, fatigue and fever. HENT: Positive for congestion and rhinorrhea. Negative for trouble swallowing. Lots of sinus problems   Eyes: Negative for visual disturbance. Respiratory: Negative for cough, shortness of breath and wheezing. Cardiovascular: Positive for chest pain. Negative for palpitations and leg swelling. Few seconds, went to ER   Gastrointestinal: Positive for constipation and diarrhea. Skin: Negative for color change. Neurological: Negative for dizziness, syncope and headaches. Psychiatric/Behavioral: Positive for dysphoric mood. The patient is nervous/anxious. PHYSICAL EXAM:      Vitals:    03/31/22 0952   BP: 120/64   Site: Left Upper Arm     BP Readings from Last 3 Encounters:   03/31/22 120/64   01/19/22 104/74   01/03/22 129/80        Physical Exam  Constitutional:       General: She is not in acute distress. Appearance: Normal appearance. She is well-developed. HENT:      Head: Normocephalic and atraumatic. Right Ear: External ear normal.      Left Ear: External ear normal.      Nose:      Right Sinus: Maxillary sinus tenderness present. No frontal sinus tenderness. Left Sinus: Maxillary sinus tenderness present. No frontal sinus tenderness. Eyes:      Conjunctiva/sclera: Conjunctivae normal.   Cardiovascular:      Rate and Rhythm: Normal rate and regular rhythm. Pulses: Normal pulses. Heart sounds: Normal heart sounds. No murmur heard. Pulmonary:      Effort: Pulmonary effort is normal.      Breath sounds: Normal breath sounds. No wheezing. Abdominal:      General: Bowel sounds are normal.      Palpations: Abdomen is soft. There is no mass. Tenderness: There is no abdominal tenderness. Musculoskeletal:      Cervical back: Normal range of motion and neck supple. Right lower leg: No edema. Left lower leg: No edema. Lymphadenopathy:      Cervical: No cervical adenopathy. Skin:     General: Skin is warm and dry. Findings: No rash. Neurological:      Mental Status: She is alert.       Gait: Gait normal.   Psychiatric:         Mood and Affect: Mood normal.         Behavior: Behavior normal.          LABORATORY FINDINGS:    CBC:   Lab Results   Component Value Date    WBC 6.9 01/19/2022    HGB 13.0 01/19/2022     01/19/2022     BMP:   Lab Results   Component Value Date     01/19/2022    K 3.8 01/19/2022     01/19/2022    CO2 23 01/19/2022    BUN 9 01/19/2022    CREATININE 0.69 01/19/2022    GLUCOSE 119 01/19/2022     HEMOGLOBIN A1C:   Lab Results   Component Value Date    LABA1C 4.8 10/26/2021     FASTING LIPID PANEL:   Lab Results   Component Value Date    HDL 43 10/26/2021    LDLCHOLESTEROL 97 10/26/2021     Lab Results   Component Value Date    VITD25 11.0 (L) 01/01/2022         ASSESSMENT AND PLAN:      Visit Diagnoses and Associated Orders     Other acute pulmonary embolism without acute cor pulmonale (HCC)    -  Primary    Asymptomatic, continue Eliquis & will schedule follow up with PCP to determine length of treatment. Hx of bariatric surgery        ondansetron (ZOFRAN-ODT) 4 MG disintegrating tablet [[id number]]           Anxiety and depression        Chronic, patient advised to begin Cymbalta and take consistently. Advised on exercise, healthy diet         Vitamin D deficiency        Begin high dose supplement    vitamin D (ERGOCALCIFEROL) 1.25 MG (12812 UT) CAPS capsule [[id number]]           Chronic sinusitis, unspecified location        Chronic, advised to begin Flonase, will refer to ENT at her request    Silvia Cline MD, Otolaryngology, [facility]]           Nausea        Chronic, intermittent, since gastric bypass. Continue Zofran as needed    ondansetron (ZOFRAN-ODT) 4 MG disintegrating tablet [[id number]]                  FOLLOW UP AND INSTRUCTIONS:     Return in about 1 month (around 4/30/2022) for schedule with PCP, routine follow up. · Tila Ohs received counseling on the following healthy behaviors: nutrition, exercise and medication adherence    · Discussed use, benefit, and side effects of prescribed medications. Barriers to medication compliance addressed. All patient questions answered. Pt voiced understanding. GRICELDA Roberts CNP    4/1/2022, 1:16 PM    Please note that this chart was generated using voice recognition Dragon dictation software.   Although every effort was made to ensure the accuracy of this automatedtranscription, some errors in transcription may have occurred.

## 2022-04-01 ASSESSMENT — ENCOUNTER SYMPTOMS
TROUBLE SWALLOWING: 0
COLOR CHANGE: 0
RHINORRHEA: 1

## 2022-04-27 DIAGNOSIS — R11.0 NAUSEA: ICD-10-CM

## 2022-04-27 DIAGNOSIS — K59.09 CHRONIC CONSTIPATION: ICD-10-CM

## 2022-04-27 RX ORDER — PANTOPRAZOLE SODIUM 40 MG/1
40 TABLET, DELAYED RELEASE ORAL
Qty: 90 TABLET | Refills: 5 | Status: SHIPPED | OUTPATIENT
Start: 2022-04-27

## 2022-04-28 RX ORDER — DULOXETIN HYDROCHLORIDE 20 MG/1
20 CAPSULE, DELAYED RELEASE ORAL DAILY
Qty: 30 CAPSULE | Refills: 3 | Status: SHIPPED | OUTPATIENT
Start: 2022-04-28 | End: 2022-05-19 | Stop reason: SDUPTHER

## 2022-05-04 ENCOUNTER — OFFICE VISIT (OUTPATIENT)
Dept: SURGERY | Age: 52
End: 2022-05-04
Payer: COMMERCIAL

## 2022-05-04 DIAGNOSIS — Z98.890 STATUS POST ABDOMINOPLASTY: Primary | ICD-10-CM

## 2022-05-04 PROCEDURE — G8427 DOCREV CUR MEDS BY ELIG CLIN: HCPCS | Performed by: PLASTIC SURGERY

## 2022-05-04 PROCEDURE — 1036F TOBACCO NON-USER: CPT | Performed by: PLASTIC SURGERY

## 2022-05-04 PROCEDURE — G8419 CALC BMI OUT NRM PARAM NOF/U: HCPCS | Performed by: PLASTIC SURGERY

## 2022-05-04 PROCEDURE — 99213 OFFICE O/P EST LOW 20 MIN: CPT | Performed by: PLASTIC SURGERY

## 2022-05-04 NOTE — PROGRESS NOTES
[address] [facility]  [facility] [address]       Office Progress Note     Chief Complaint   Patient presents with    Post-Op Check     panniculectomy DOS 11/4/21        HPI:   Natalie Chery is a 54 y.o. female who presents         panniculectommy DOS 11/4/21       SUBJECTIVE  Patient seen and examined. S/p panniculectomy and abdominoplasty. The pannus weight 7.156 pounds. The pathology was consistent with skin and subcutaneous tissue. Patient is very happy with the results. Medications:     Current Outpatient Medications   Medication Sig Dispense Refill    DULoxetine (CYMBALTA) 20 MG extended release capsule Take 1 capsule by mouth daily 30 capsule 3    pantoprazole (PROTONIX) 40 MG tablet Take 1 tablet by mouth every morning (before breakfast) 90 tablet 5    vitamin D (ERGOCALCIFEROL) 1.25 MG (74764 UT) CAPS capsule Take 1 capsule by mouth once a week for 12 doses 12 capsule 0    ondansetron (ZOFRAN-ODT) 4 MG disintegrating tablet Place 1 tablet under the tongue every 8 hours as needed for Nausea 90 tablet 1    apixaban (ELIQUIS) 5 MG TABS tablet Take 2 tablets by mouth 2 times daily For 6 more days then 1 tablet 2 times daily till reevaluated by PCP in office 60 tablet 1    butalbital-acetaminophen-caffeine (FIORICET, ESGIC) -40 MG per tablet Take 1 tablet by mouth every 4 hours as needed for Headaches 15 tablet 0    lactobacillus (BACID) TABS Take 1 tablet by mouth 3 times daily (with meals) (Patient not taking: Reported on 3/31/2022) 90 tablet 0    fluticasone (FLONASE) 50 MCG/ACT nasal spray 2 sprays by Each Nostril route daily 16 g 3    Calcium-Vitamin D-Vitamin K (CALCIUM SOFT CHEWS PO) Take by mouth 3 times daily      senna-docusate (PERICOLACE) 8.6-50 MG per tablet Take 2 tablets by mouth daily 180 tablet 3     No current facility-administered medications for this visit.       Allergies: Allergies   Allergen Reactions    Nsaids     Hydrocodone-Acetaminophen Nausea Only    Oxycodone-Acetaminophen Nausea Only    Sulfamethoxazole-Trimethoprim Hives and Rash        Past Medical History:   Diagnosis Date    Abnormal findings on esophagogastroduodenoscopy (EGD) 2020    Gastritis, tiny hiatal hernia, candy cane deformity of gastrojejunostomy    Arthritis     Chronic constipation     Gastritis     GERD (gastroesophageal reflux disease)     H. pylori infection     Headache(784.0)     Hiatal hernia     Tiny per EGD 20    Intertrigo 2021    Prolonged emergence from general anesthesia     Sleep apnea     Pt states has resolved since gastric bypass surgery.       Past Surgical History:   Procedure Laterality Date    ABDOMINOPLASTY N/A 2021    ABDOMINOPLASTY WITH BIOPATCH AROUND PANTERA DRAIN AND INCISIONAL WOUND VAC WITH PRE OP TAP BLOCK performed by Raúl Mahoney MD at [address] [place] [address]  1991    COLONOSCOPY      COLONOSCOPY N/A 3/10/2021    COLONOSCOPY DIAGNOSTIC performed by Fox Choi MD at [place] [address]  2021    PANNICULECTOMY (N/A Abdomen)     COSMETIC SURGERY  2021    ABDOMINOPLASTY WITH BIOPATCH AROUND PANTERA DRAIN AND INCISIONAL WOUND VAC     ENDOSCOPY, COLON, DIAGNOSTIC  2020    GASTRIC BYPASS SURGERY  2016    Roberto-en-Y    HYSTERECTOMY      LAPAROSCOPY      LEEP      LIPECTOMY N/A 2021    PANNICULECTOMY performed by [facility]l [facility] MD at [facility]3]       Social History     Socioeconomic History    Marital status:      Spouse name: Not on file    Number of children: Not on file    Years of education: Not on file    Highest education level: Not on file   Occupational History    Not on file   Tobacco Use    Smoking status: Former Smoker     Quit date:      Years since quittin.3    Smokeless tobacco: Never Used   Vaping Use    Vaping Use: Former   Substance and Sexual Activity    Alcohol use: Not Currently    Drug use: No    Sexual activity: Not on file   Other Topics Concern    Not on file   Social History Narrative    Not on file     Social Determinants of Health     Financial Resource Strain: Low Risk     Difficulty of Paying Living Expenses: Not hard at all   Food Insecurity: No Food Insecurity    Worried About Running Out of Food in the Last Year: Never true    Param of Food in the Last Year: Never true   Transportation Needs: No Transportation Needs    Lack of Transportation (Medical): No    Lack of Transportation (Non-Medical): No   Physical Activity:     Days of Exercise per Week: Not on file    Minutes of Exercise per Session: Not on file   Stress:     Feeling of Stress : Not on file   Social Connections:     Frequency of Communication with Friends and Family: Not on file    Frequency of Social Gatherings with Friends and Family: Not on file    Attends [employer] Services: Not on file    Active Member of [address] [place] [address] or Organizations: Not on file    Attends Club or Organization Meetings: Not on file    Marital Status: Not on file   Intimate Partner Violence:     Fear of Current or Ex-Partner: Not on file    Emotionally Abused: Not on file    Physically Abused: Not on file    Sexually Abused: Not on file   Housing Stability:     Unable to Pay for Housing in the Last Year: Not on file    Number of [place] in the Last Year: Not on file    Unstable Housing in the Last Year: Not on file     Family History   Problem Relation Age of Onset    Cancer Mother 64        ovarian cancer     High Cholesterol Father     Hypertension Father     Heart Disease Father     High Cholesterol Sister     Hypertension Sister     High Cholesterol Brother     Hypertension Brother      Review of Systems:     Constitutional: Negative for fever, chills, fatigue and unexpected weight change.    HENT: Negative for hearing loss, sore throat and facial swelling. Eyes: Negative for pain and discharge. Respiratory: Negative for cough and shortness of breath. Cardiovascular: Negative for chest pain. Gastrointestinal: Negative for nausea, vomiting, diarrhea and constipation. Skin: Negative for pallor and rash. Neurological: Negative for seizures, syncope and numbness. Hematological: Does not bruise/bleed easily. Psychiatric/Behavioral: Negative for behavioral problems. The patient is not nervous/anxious. Physical Exam:   There were no vitals taken for this visit. There is no height or weight on file to calculate BMI. Physical Exam   Constitutional: Oriented to person, place, and time. Appears well-developed and well-nourished. No distress. HEENT: Normocephalic and atraumatic. External ears within normal limits. Extraocular muscles are intact. Conjunctivae were anicteric. Pulmonary/Chest: Effort normal. No respiratory distress. Neurological: Alert and oriented to person, place, and time. Skin: Skin is warm and dry. No rash noted. Extremities:  Moves all extremities. Abdomen:  Soft. Psychiatric: Normal mood and affect. Behavior is normal.    Labs:   @LASTLABM(1m)@    Imaging:   No results found. Impression/Plan:      Diagnosis Orders   1. Postoperative examination         Plan:  Continue compression binder. Follow-up in 3 months.           Electronically signed by:  PIEDAD Stafford

## 2022-05-19 ENCOUNTER — OFFICE VISIT (OUTPATIENT)
Dept: INTERNAL MEDICINE CLINIC | Age: 52
End: 2022-05-19
Payer: COMMERCIAL

## 2022-05-19 VITALS
HEART RATE: 74 BPM | HEIGHT: 66 IN | SYSTOLIC BLOOD PRESSURE: 108 MMHG | OXYGEN SATURATION: 98 % | DIASTOLIC BLOOD PRESSURE: 70 MMHG | BODY MASS INDEX: 26.68 KG/M2 | WEIGHT: 166 LBS

## 2022-05-19 DIAGNOSIS — E55.9 VITAMIN D DEFICIENCY: ICD-10-CM

## 2022-05-19 DIAGNOSIS — Z98.84 HX OF BARIATRIC SURGERY: ICD-10-CM

## 2022-05-19 DIAGNOSIS — I26.99 OTHER ACUTE PULMONARY EMBOLISM WITHOUT ACUTE COR PULMONALE (HCC): Primary | ICD-10-CM

## 2022-05-19 DIAGNOSIS — F33.40 RECURRENT MAJOR DEPRESSIVE DISORDER, IN REMISSION (HCC): ICD-10-CM

## 2022-05-19 PROCEDURE — G8419 CALC BMI OUT NRM PARAM NOF/U: HCPCS | Performed by: INTERNAL MEDICINE

## 2022-05-19 PROCEDURE — 1036F TOBACCO NON-USER: CPT | Performed by: INTERNAL MEDICINE

## 2022-05-19 PROCEDURE — G8427 DOCREV CUR MEDS BY ELIG CLIN: HCPCS | Performed by: INTERNAL MEDICINE

## 2022-05-19 PROCEDURE — 99214 OFFICE O/P EST MOD 30 MIN: CPT | Performed by: INTERNAL MEDICINE

## 2022-05-19 RX ORDER — DULOXETIN HYDROCHLORIDE 20 MG/1
20 CAPSULE, DELAYED RELEASE ORAL DAILY
Qty: 90 CAPSULE | Refills: 3 | Status: SHIPPED | OUTPATIENT
Start: 2022-05-19

## 2022-05-19 ASSESSMENT — ENCOUNTER SYMPTOMS
DIARRHEA: 0
EYE DISCHARGE: 0
COLOR CHANGE: 0
EYE PAIN: 0
WHEEZING: 0
ABDOMINAL DISTENTION: 0
TROUBLE SWALLOWING: 0
COUGH: 0
BLOOD IN STOOL: 0
SHORTNESS OF BREATH: 0

## 2022-05-19 NOTE — PROGRESS NOTES
[address] [address]  Dept: [phone]  Dept Fax: [phone]    Armida Sethi is a 54 y.o. female who presents today for her medical conditions/complaintsas noted below. Armida Sethi is c/o of   Chief Complaint   Patient presents with    Weight Management         HPI:     Post bariatic surg  Had pe  onm eliqis        Hemoglobin A1C (%)   Date Value   10/26/2021 4.8             ( goal A1Cis < 7)   No results found for: LABMICR  LDL Cholesterol (mg/dL)   Date Value   10/26/2021 97       (goal LDL is <100)   AST (U/L)   Date Value   01/19/2022 17     ALT (U/L)   Date Value   01/19/2022 11     BUN (mg/dL)   Date Value   01/19/2022 9     BP Readings from Last 3 Encounters:   05/19/22 108/70   03/31/22 120/64   01/19/22 104/74          (goal 120/80)    Past Medical History:   Diagnosis Date    Abnormal findings on esophagogastroduodenoscopy (EGD) 06/12/2020    Gastritis, tiny hiatal hernia, candy cane deformity of gastrojejunostomy    Arthritis     Chronic constipation     Gastritis     GERD (gastroesophageal reflux disease)     H. pylori infection     Headache(784.0)     Hiatal hernia     Tiny per EGD 6-12-20    Intertrigo 6/16/2021    Prolonged emergence from general anesthesia     Sleep apnea     Pt states has resolved since gastric bypass surgery.        Past Surgical History:   Procedure Laterality Date    ABDOMINOPLASTY N/A 11/4/2021    ABDOMINOPLASTY WITH BIOPATCH AROUND PANTERA DRAIN AND INCISIONAL WOUND VAC WITH PRE OP TAP BLOCK performed by Juan Diego Romano MD at [address] [place] [address]  11/1991    COLONOSCOPY      COLONOSCOPY N/A 3/10/2021    COLONOSCOPY DIAGNOSTIC performed by Susan Carlson MD at [place] [address]  11/04/2021    PANNICULECTOMY (N/A Abdomen)     COSMETIC SURGERY  11/04/2021    ABDOMINOPLASTY WITH BIOPATCH AROUND PANTERA DRAIN AND INCISIONAL WOUND VAC     ENDOSCOPY, COLON, DIAGNOSTIC 2020    GASTRIC BYPASS SURGERY  2016    Roberto-en-Y    HYSTERECTOMY      LAPAROSCOPY      LEEP      LIPECTOMY N/A 2021    PANNICULECTOMY performed [facility] Samanta [facility] at [facility]         Family History   Problem Relation Age of Onset    Cancer Mother 64        ovarian cancer     High Cholesterol Father     Hypertension Father     Heart Disease Father     High Cholesterol Sister     Hypertension Sister     High Cholesterol Brother     Hypertension Brother        Social History     Tobacco Use    Smoking status: Former Smoker     Quit date:      Years since quittin.3    Smokeless tobacco: Never Used   Substance Use Topics    Alcohol use: Not Currently      Current Outpatient Medications   Medication Sig Dispense Refill    apixaban (ELIQUIS) 5 MG TABS tablet Take 1 tablet by mouth 2 times daily For 6 more days then 1 tablet 2 times daily till reevaluated by PCP in office 80 tablet 0    DULoxetine (CYMBALTA) 20 MG extended release capsule Take 1 capsule by mouth daily 90 capsule 3    pantoprazole (PROTONIX) 40 MG tablet Take 1 tablet by mouth every morning (before breakfast) 90 tablet 5    vitamin D (ERGOCALCIFEROL) 1.25 MG (77904 UT) CAPS capsule Take 1 capsule by mouth once a week for 12 doses 12 capsule 0    ondansetron (ZOFRAN-ODT) 4 MG disintegrating tablet Place 1 tablet under the tongue every 8 hours as needed for Nausea 90 tablet 1    butalbital-acetaminophen-caffeine (FIORICET, ESGIC) -40 MG per tablet Take 1 tablet by mouth every 4 hours as needed for Headaches 15 tablet 0    lactobacillus (BACID) TABS Take 1 tablet by mouth 3 times daily (with meals) 90 tablet 0    fluticasone (FLONASE) 50 MCG/ACT nasal spray 2 sprays by Each Nostril route daily 16 g 3    Calcium-Vitamin D-Vitamin K (CALCIUM SOFT CHEWS PO) Take by mouth 3 times daily      senna-docusate (PERICOLACE) 8.6-50 MG per tablet Take 2 tablets by mouth daily 180 tablet 3     No current facility-administered medications for this visit. Allergies   Allergen Reactions    Nsaids     Hydrocodone-Acetaminophen Nausea Only    Oxycodone-Acetaminophen Nausea Only    Sulfamethoxazole-Trimethoprim Hives and Rash       Health Maintenance   Topic Date Due    HIV screen  Never done    Hepatitis C screen  Never done    DTaP/Tdap/Td vaccine (1 - Tdap) Never done    COVID-19 Vaccine (3 - Booster for Moderna series) 05/21/2022    Depression Monitoring  06/16/2022    Diabetes screen  10/26/2024    Lipids  10/26/2026    Colorectal Cancer Screen  03/10/2031    Flu vaccine  Completed    Hepatitis A vaccine  Aged Out    Hepatitis B vaccine  Aged Out    Hib vaccine  Aged Out    Meningococcal (ACWY) vaccine  Aged Out    Pneumococcal 0-64 years Vaccine  Aged Out       Subjective:     Review of Systems   Constitutional: Negative for appetite change, diaphoresis and fatigue. HENT: Negative for ear discharge and trouble swallowing. Eyes: Negative for pain and discharge. Respiratory: Negative for cough, shortness of breath and wheezing. Cardiovascular: Negative for chest pain and palpitations. Gastrointestinal: Negative for abdominal distention, blood in stool and diarrhea. Endocrine: Negative for polydipsia and polyphagia. Genitourinary: Negative for difficulty urinating and frequency. Musculoskeletal: Negative for gait problem, myalgias and neck pain. Skin: Negative for color change and rash. Allergic/Immunologic: Negative for environmental allergies and food allergies. Neurological: Negative for dizziness and headaches. Hematological: Negative for adenopathy. Does not bruise/bleed easily. Psychiatric/Behavioral: Negative for behavioral problems and sleep disturbance. Objective:     Physical Exam  Constitutional:       Appearance: She is well-developed. She is not diaphoretic. HENT:      Head: Normocephalic and atraumatic. Eyes:      General:         Right eye: No discharge. Left eye: No discharge. Extraocular Movements:      Right eye: Normal extraocular motion. Left eye: Normal extraocular motion. Conjunctiva/sclera: Conjunctivae normal.      Right eye: Right conjunctiva is not injected. Left eye: Left conjunctiva is not injected. Neck:      Thyroid: No thyroid mass or thyromegaly. Vascular: No JVD. Cardiovascular:      Rate and Rhythm: Normal rate and regular rhythm. Heart sounds: No murmur heard. No friction rub. Pulmonary:      Effort: Pulmonary effort is normal. No tachypnea, bradypnea, accessory muscle usage or respiratory distress. Breath sounds: Normal breath sounds. No wheezing or rales. Abdominal:      General: Bowel sounds are normal. There is no distension. Palpations: Abdomen is soft. Tenderness: There is no abdominal tenderness. There is no rebound. Musculoskeletal:         General: No tenderness. Normal range of motion. Cervical back: Normal range of motion and neck supple. No edema or erythema. Lymphadenopathy:      Head:      Right side of head: No submental or submandibular adenopathy. Left side of head: No submental or submandibular adenopathy. Cervical: No cervical adenopathy. Skin:     General: Skin is warm. Coloration: Skin is not pale. Findings: No bruising, ecchymosis or rash. Neurological:      Mental Status: She is alert and oriented to person, place, and time. Cranial Nerves: No cranial nerve deficit. Sensory: No sensory deficit. Motor: No atrophy or abnormal muscle tone. Coordination: Coordination normal.   Psychiatric:         Mood and Affect: Mood is not anxious. Affect is not angry. Speech: Speech is not slurred. Behavior: Behavior normal. Behavior is not aggressive. Thought Content: Thought content does not include homicidal ideation.          Cognition and Memory: Memory is not impaired. /70   Pulse 74   Ht 5' 6\" (1.676 m)   Wt 166 lb (75.3 kg)   SpO2 98%   BMI 26.79 kg/m²     Assessment:       Diagnosis Orders   1. Other acute pulmonary embolism without acute cor pulmonale ([address]     2. Hx of bariatric surgery     3. Vitamin D deficiency     4. Recurrent major depressive disorder, in remission [facility])               Plan:      Return in about 4 months (around 9/19/2022). No orders of the defined types were placed in this encounter. Orders Placed This Encounter   Medications    apixaban (ELIQUIS) 5 MG TABS tablet     Sig: Take 1 tablet by mouth 2 times daily For 6 more days then 1 tablet 2 times daily till reevaluated by PCP in office     Dispense:  80 tablet     Refill:  0    DULoxetine (CYMBALTA) 20 MG extended release capsule     Sig: Take 1 capsule by mouth daily     Dispense:  90 capsule     Refill:  3    pe in dec  tatiana complete six months in end of June  Post bariatric surg  Mood better  No n eed to increase dose of cymbalta     Patient given educational materials - see patient instructions. Discussed use, benefit, and side effects of prescribed medications. All patientquestions answered. Pt voiced understanding. Reviewed health maintenance. Instructedto continue current medications, diet and exercise. Patient agreed with treatmentplan. Follow up as directed. Please note that this chart was generated using voice recognition Dragon dictation software. Although every effort was made to ensure the accuracy of this automated transcription, some errors in transcription may have occurred.      Electronically signed by Gera Wang MD on 5/19/2022 at 3:32 PM

## 2022-05-19 NOTE — PROGRESS NOTES
Visit Information    Have you changed or started any medications since your last visit including any over-the-counter medicines, vitamins, or herbal medicines? no   Are you having any side effects from any of your medications? -  no  Have you stopped taking any of your medications? Is so, why? -  no    Have you seen any other physician or provider since your last visit? No  Have you had any other diagnostic tests since your last visit? No  Have you been seen in the emergency room and/or had an admission to a hospital since we last saw you? No  Have you had your routine dental cleaning in the past 6 months? yes -     Have you activated your [employer] account? If not, what are your barriers?  Yes     Patient Care Team:  Iqra Joseph MD as PCP - General (Internal Medicine)  Iqra Joseph MD as PCP - [facility]    Medical History Review  Past Medical, Family, and Social History reviewed and does contribute to the patient presenting condition    Health Maintenance   Topic Date Due    HIV screen  Never done    Hepatitis C screen  Never done    DTaP/Tdap/Td vaccine (1 - Tdap) Never done    COVID-19 Vaccine (3 - Booster for Jacobo Harvinder series) 05/21/2022    Depression Monitoring  06/16/2022    Diabetes screen  10/26/2024    Lipids  10/26/2026    Colorectal Cancer Screen  03/10/2031    Flu vaccine  Completed    Hepatitis A vaccine  Aged Out    Hepatitis B vaccine  Aged Out    Hib vaccine  Aged Out    Meningococcal (ACWY) vaccine  Aged Out    Pneumococcal 0-64 years Vaccine  Aged Out

## 2022-08-24 ENCOUNTER — OFFICE VISIT (OUTPATIENT)
Dept: SURGERY | Age: 52
End: 2022-08-24
Payer: COMMERCIAL

## 2022-08-24 DIAGNOSIS — Z98.890 STATUS POST ABDOMINOPLASTY: Primary | ICD-10-CM

## 2022-08-24 PROCEDURE — G8427 DOCREV CUR MEDS BY ELIG CLIN: HCPCS | Performed by: PLASTIC SURGERY

## 2022-08-24 PROCEDURE — 1036F TOBACCO NON-USER: CPT | Performed by: PLASTIC SURGERY

## 2022-08-24 PROCEDURE — G8419 CALC BMI OUT NRM PARAM NOF/U: HCPCS | Performed by: PLASTIC SURGERY

## 2022-08-24 PROCEDURE — 99213 OFFICE O/P EST LOW 20 MIN: CPT | Performed by: PLASTIC SURGERY

## 2022-08-24 NOTE — PROGRESS NOTES
Hydrocodone-Acetaminophen Nausea Only    Oxycodone-Acetaminophen Nausea Only    Sulfamethoxazole-Trimethoprim Hives and Rash        Past Medical History:   Diagnosis Date    Abnormal findings on esophagogastroduodenoscopy (EGD) 2020    Gastritis, tiny hiatal hernia, candy cane deformity of gastrojejunostomy    Arthritis     Chronic constipation     Gastritis     GERD (gastroesophageal reflux disease)     H. pylori infection     Headache(784.0)     Hiatal hernia     Tiny per EGD 20    Intertrigo 2021    Prolonged emergence from general anesthesia     Sleep apnea     Pt states has resolved since gastric bypass surgery.       Past Surgical History:   Procedure Laterality Date    ABDOMINOPLASTY N/A 2021    ABDOMINOPLASTY WITH BIOPATCH AROUND PANTERA DRAIN AND INCISIONAL WOUND VAC WITH PRE OP TAP BLOCK performed by Africa Pedro MD at [place] [address]  1991    COLONOSCOPY      COLONOSCOPY N/A 3/10/2021    COLONOSCOPY DIAGNOSTIC performed by Chalo Ba MD at [address] [place] [address]  2021    PANNICULECTOMY (N/A Abdomen)     COSMETIC SURGERY  2021    ABDOMINOPLASTY WITH BIOPATCH AROUND PANTERA DRAIN AND INCISIONAL WOUND VAC     ENDOSCOPY, COLON, DIAGNOSTIC  2020    GASTRIC BYPASS  2016    Roberto-en-Y    HYSTERECTOMY      LAPAROSCOPY      LEEP      LIPECTOMY N/A 2021    PANNICULECTOMY performed by Africa Pedro MD at [address] [place] [address]       Social History     Socioeconomic History    Marital status:      Spouse name: Not on file    Number of children: Not on file    Years of education: Not on file    Highest education level: Not on file   Occupational History    Not on file   Tobacco Use    Smoking status: Former     Types: Cigarettes     Quit date:      Years since quittin.6    Smokeless tobacco: Never   Vaping Use    Vaping Use: Former   Substance and Sexual Activity    Alcohol use: Not Currently Drug use: No    Sexual activity: Not on file   Other Topics Concern    Not on file   Social History Narrative    Not on file     Social Determinants of Health     Financial Resource Strain: Low Risk     Difficulty of Paying Living Expenses: Not hard at all   Food Insecurity: No Food Insecurity    Worried About Running Out of Food in the Last Year: Never true    [address] in the Last Year: Never true   Transportation Needs: No Transportation Needs    Lack of Transportation (Medical): No    Lack of Transportation (Non-Medical): No   Physical Activity: Not on file   Stress: Not on file   Social Connections: Not on file   Intimate Partner Violence: Not on file   Housing Stability: Not on file     Family History   Problem Relation Age of Onset    Cancer Mother 64        ovarian cancer     High Cholesterol Father     Hypertension Father     Heart Disease Father     High Cholesterol Sister     Hypertension Sister     High Cholesterol Brother     Hypertension Brother      Review of Systems:     Constitutional: Negative for fever, chills, fatigue and unexpected weight change. HENT: Negative for hearing loss, sore throat and facial swelling. Eyes: Negative for pain and discharge. Respiratory: Negative for cough and shortness of breath. Cardiovascular: Negative for chest pain. Gastrointestinal: Negative for nausea, vomiting, diarrhea and constipation. Skin: Negative for pallor and rash. Neurological: Negative for seizures, syncope and numbness. Hematological: Does not bruise/bleed easily. Psychiatric/Behavioral: Negative for behavioral problems. The patient is not nervous/anxious. Physical Exam:   There were no vitals taken for this visit. There is no height or weight on file to calculate BMI. Physical Exam   Constitutional: Oriented to person, place, and time. Appears well-developed and well-nourished. No distress. HEENT: Normocephalic and atraumatic. External ears within normal limits. Extraocular muscles are intact. Conjunctivae were anicteric. Pulmonary/Chest: Effort normal. No respiratory distress. Neurological: Alert and oriented to person, place, and time. Skin: Skin is warm and dry. No rash noted. Extremities:  Moves all extremities. Abdomen:  Soft. Psychiatric: Normal mood and affect. Behavior is normal.    Labs:   @LASTLABM(1m)@    Imaging:   No results found. Impression/Plan:      Diagnosis Orders   1. Postoperative examination            Plan:  Continue massaging the dogears. We also discussed the mons pubis. .  We discussed the risks that are associated with an mons pubis lift. Follow-up in 3 months.           Electronically signed by:  Miladis Rasmussen M.D.   8/24/2022

## 2022-11-06 ENCOUNTER — APPOINTMENT (OUTPATIENT)
Dept: GENERAL RADIOLOGY | Age: 52
End: 2022-11-06
Payer: COMMERCIAL

## 2022-11-06 ENCOUNTER — HOSPITAL ENCOUNTER (EMERGENCY)
Age: 52
Discharge: HOME | End: 2022-11-06
Attending: EMERGENCY MEDICINE
Payer: COMMERCIAL

## 2022-11-06 ENCOUNTER — APPOINTMENT (OUTPATIENT)
Dept: CT IMAGING | Age: 52
End: 2022-11-06
Payer: COMMERCIAL

## 2022-11-06 VITALS
SYSTOLIC BLOOD PRESSURE: 114 MMHG | HEART RATE: 74 BPM | BODY MASS INDEX: 26.84 KG/M2 | WEIGHT: 167 LBS | RESPIRATION RATE: 16 BRPM | OXYGEN SATURATION: 98 % | HEIGHT: 66 IN | DIASTOLIC BLOOD PRESSURE: 82 MMHG | TEMPERATURE: 98.7 F

## 2022-11-06 DIAGNOSIS — R10.9 FLANK PAIN: Primary | ICD-10-CM

## 2022-11-06 LAB
ABSOLUTE EOS #: 0.21 K/UL (ref 0–0.44)
ABSOLUTE IMMATURE GRANULOCYTE: 0.01 K/UL (ref 0–0.3)
ABSOLUTE LYMPH #: 2.09 K/UL (ref 1.1–3.7)
ABSOLUTE MONO #: 0.51 K/UL (ref 0.1–1.2)
ALBUMIN SERPL-MCNC: 4.2 G/DL (ref 3.5–5.2)
ALP BLD-CCNC: 77 U/L (ref 35–104)
ALT SERPL-CCNC: 13 U/L (ref 5–33)
AMYLASE: 85 U/L (ref 28–100)
ANION GAP SERPL CALCULATED.3IONS-SCNC: 9 MMOL/L (ref 9–17)
AST SERPL-CCNC: 17 U/L
BACTERIA: ABNORMAL
BASOPHILS # BLD: 1 % (ref 0–2)
BASOPHILS ABSOLUTE: 0.06 K/UL (ref 0–0.2)
BILIRUB SERPL-MCNC: 0.4 MG/DL (ref 0.3–1.2)
BILIRUBIN URINE: NEGATIVE
BUN BLDV-MCNC: 8 MG/DL (ref 6–20)
BUN/CREAT BLD: 12 (ref 9–20)
CALCIUM SERPL-MCNC: 8.9 MG/DL (ref 8.6–10.4)
CHLORIDE BLD-SCNC: 108 MMOL/L (ref 98–107)
CO2: 24 MMOL/L (ref 20–31)
COLOR: YELLOW
CREAT SERPL-MCNC: 0.68 MG/DL (ref 0.5–0.9)
D-DIMER QUANTITATIVE: <0.27 MG/L FEU (ref 0–0.59)
EOSINOPHILS RELATIVE PERCENT: 3 % (ref 1–4)
EPITHELIAL CELLS UA: ABNORMAL /HPF (ref 0–5)
GFR SERPL CREATININE-BSD FRML MDRD: >60 ML/MIN/1.73M2
GLUCOSE BLD-MCNC: 83 MG/DL (ref 70–99)
GLUCOSE URINE: NEGATIVE
HCT VFR BLD CALC: 44.1 % (ref 36.3–47.1)
HEMOGLOBIN: 14.4 G/DL (ref 11.9–15.1)
IMMATURE GRANULOCYTES: 0 %
KETONES, URINE: NEGATIVE
LEUKOCYTE ESTERASE, URINE: NEGATIVE
LIPASE: 47 U/L (ref 13–60)
LYMPHOCYTES # BLD: 33 % (ref 24–43)
MCH RBC QN AUTO: 28.5 PG (ref 25.2–33.5)
MCHC RBC AUTO-ENTMCNC: 32.7 G/DL (ref 28.4–34.8)
MCV RBC AUTO: 87.2 FL (ref 82.6–102.9)
MONOCYTES # BLD: 8 % (ref 3–12)
NITRITE, URINE: NEGATIVE
NRBC AUTOMATED: 0 PER 100 WBC
PDW BLD-RTO: 12.9 % (ref 11.8–14.4)
PH UA: 6 (ref 5–8)
PLATELET # BLD: 261 K/UL (ref 138–453)
PMV BLD AUTO: 9.4 FL (ref 8.1–13.5)
POTASSIUM SERPL-SCNC: 3.7 MMOL/L (ref 3.7–5.3)
PROTEIN UA: NEGATIVE
RBC # BLD: 5.06 M/UL (ref 3.95–5.11)
RBC UA: ABNORMAL /HPF (ref 0–2)
SEG NEUTROPHILS: 55 % (ref 36–65)
SEGMENTED NEUTROPHILS ABSOLUTE COUNT: 3.39 K/UL (ref 1.5–8.1)
SODIUM BLD-SCNC: 141 MMOL/L (ref 135–144)
SPECIFIC GRAVITY UA: 1.01 (ref 1–1.03)
TOTAL PROTEIN: 7 G/DL (ref 6.4–8.3)
TURBIDITY: CLEAR
URINE HGB: NEGATIVE
UROBILINOGEN, URINE: NORMAL
WBC # BLD: 6.3 K/UL (ref 3.5–11.3)
WBC UA: ABNORMAL /HPF (ref 0–5)

## 2022-11-06 PROCEDURE — 99284 EMERGENCY DEPT VISIT MOD MDM: CPT

## 2022-11-06 PROCEDURE — 80053 COMPREHEN METABOLIC PANEL: CPT

## 2022-11-06 PROCEDURE — 74176 CT ABD & PELVIS W/O CONTRAST: CPT

## 2022-11-06 PROCEDURE — 82150 ASSAY OF AMYLASE: CPT

## 2022-11-06 PROCEDURE — 72100 X-RAY EXAM L-S SPINE 2/3 VWS: CPT

## 2022-11-06 PROCEDURE — 81001 URINALYSIS AUTO W/SCOPE: CPT

## 2022-11-06 PROCEDURE — 85025 COMPLETE CBC W/AUTO DIFF WBC: CPT

## 2022-11-06 PROCEDURE — 85379 FIBRIN DEGRADATION QUANT: CPT

## 2022-11-06 PROCEDURE — 83690 ASSAY OF LIPASE: CPT

## 2022-11-06 RX ORDER — CYCLOBENZAPRINE HCL 10 MG
10 TABLET ORAL NIGHTLY PRN
Qty: 20 TABLET | Refills: 0 | Status: SHIPPED | OUTPATIENT
Start: 2022-11-06 | End: 2022-11-16

## 2022-11-06 ASSESSMENT — PAIN DESCRIPTION - DIRECTION: RADIATING_TOWARDS: RIGHT SIDE

## 2022-11-06 ASSESSMENT — PAIN DESCRIPTION - ORIENTATION: ORIENTATION: MID

## 2022-11-06 ASSESSMENT — ENCOUNTER SYMPTOMS
ABDOMINAL DISTENTION: 0
EYE PAIN: 0
EYE DISCHARGE: 0
ABDOMINAL PAIN: 0
CHEST TIGHTNESS: 0
FACIAL SWELLING: 0
BACK PAIN: 1
SHORTNESS OF BREATH: 0

## 2022-11-06 ASSESSMENT — PAIN DESCRIPTION - LOCATION: LOCATION: BACK

## 2022-11-06 ASSESSMENT — PAIN DESCRIPTION - DESCRIPTORS: DESCRIPTORS: SHARP

## 2022-11-06 ASSESSMENT — PAIN - FUNCTIONAL ASSESSMENT: PAIN_FUNCTIONAL_ASSESSMENT: 0-10

## 2022-11-06 ASSESSMENT — PAIN DESCRIPTION - FREQUENCY: FREQUENCY: INTERMITTENT

## 2022-11-06 ASSESSMENT — PAIN DESCRIPTION - PAIN TYPE: TYPE: ACUTE PAIN

## 2022-11-06 NOTE — ED PROVIDER NOTES
EMERGENCY DEPARTMENT ENCOUNTER    Pt Name: Claudia Duque  MRN: [id number]  [place] 1970  Date of evaluation: 11/6/22  CHIEF COMPLAINT       Chief Complaint   Patient presents with    Back Pain     C/o right middle/side pain x3 weeks ago, states pain comes and goes, pain takes her breath away, denies hx kidney stones or urinary symptoms, pain goes away when she lays down and relaxes     HISTORY OF PRESENT ILLNESS   HPI  The patient is a 28-year-old female with a history arthritis, gastric bypass who presented to the emergency department secondary to back pain. Patient complains of a 3-week history of pain localized to her low mid back. Patient with 3 weeks ago she was attempting to change a faucet laying flat she had sudden onset of pain in her back with subsequent resolved. States since then she has had increased pain localized to her mid back with radiation to her right groin. She states pain is intermittent in nature it is maximal intensity is 4 out of 10 on the pain scale. She denied previous history of kidney stone. She denied hematuria or dysuria. Patient has a history of gastric bypass surgery and does not get fluids as frequently as it should and does not urinate as much. Patient denied loss of bladder or bowel urinary retention. Patient denies chest pain, shortness of breath, nausea, vomiting, fevers or chills. REVIEW OF SYSTEMS     Review of Systems   Constitutional:  Negative for chills, diaphoresis and fever. HENT:  Negative for congestion, ear pain and facial swelling. Eyes:  Negative for pain, discharge and visual disturbance. Respiratory:  Negative for chest tightness and shortness of breath. Cardiovascular:  Negative for chest pain and palpitations. Gastrointestinal:  Negative for abdominal distention and abdominal pain. Genitourinary:  Negative for difficulty urinating and flank pain. Musculoskeletal:  Positive for back pain. Skin:  Negative for wound. Neurological:  Negative for dizziness, light-headedness and headaches. PASTMEDICAL HISTORY     Past Medical History:   Diagnosis Date    Abnormal findings on esophagogastroduodenoscopy (EGD) 06/12/2020    Gastritis, tiny hiatal hernia, candy cane deformity of gastrojejunostomy    Arthritis     Chronic constipation     Gastritis     GERD (gastroesophageal reflux disease)     H. pylori infection     Headache(784.0)     Hiatal hernia     Tiny per EGD 6-12-20    Intertrigo 6/16/2021    Prolonged emergence from general anesthesia     Sleep apnea     Pt states has resolved since gastric bypass surgery.       SURGICAL HISTORY       Past Surgical History:   Procedure Laterality Date    ABDOMINOPLASTY N/A 11/4/2021    ABDOMINOPLASTY WITH BIOPATCH AROUND PANTERA DRAIN AND INCISIONAL WOUND VAC WITH PRE OP TAP BLOCK performed by Maira Hollingsworth MD at [place] [address]  11/1991    COLONOSCOPY      COLONOSCOPY N/A 3/10/2021    COLONOSCOPY DIAGNOSTIC performed by Tami Vela MD at [address] [place] [address]  11/04/2021    PANNICULECTOMY (N/A Abdomen)     COSMETIC SURGERY  11/04/2021    ABDOMINOPLASTY WITH BIOPATCH AROUND PANTERA DRAIN AND INCISIONAL WOUND VAC     ENDOSCOPY, COLON, DIAGNOSTIC  06/12/2020    GASTRIC BYPASS SURGERY  2016    Roberto-[facility]Y    HYSTERECTOMY (CERVIX STATUS UNKNOWN)      LAPAROSCOPY      LEEP      LIPECTOMY N/A 11/4/2021    PANNICULECTOMY performed by Maira Hollingsworth MD at [address] [place] [address] Drive       Previous Medications    APIXABAN (ELIQUIS) 5 MG TABS TABLET    Take 1 tablet by mouth 2 times daily For 6 more days then 1 tablet 2 times daily till reevaluated by PCP in office    BUTALBITAL-ACETAMINOPHEN-CAFFEINE (FIORICET, ESGIC) -40 MG PER TABLET    Take 1 tablet by mouth every 4 hours as needed for Headaches    CALCIUM-VITAMIN D-VITAMIN K (CALCIUM SOFT CHEWS PO)    Take by mouth 3 times daily    DULOXETINE (CYMBALTA) 20 MG EXTENDED RELEASE CAPSULE    Take 1 capsule by mouth daily    FLUTICASONE (FLONASE) 50 MCG/ACT NASAL SPRAY    2 sprays by Each Nostril route daily    LACTOBACILLUS (BACID) TABS    Take 1 tablet by mouth 3 times daily (with meals)    ONDANSETRON (ZOFRAN-ODT) 4 MG DISINTEGRATING TABLET    Place 1 tablet under the tongue every 8 hours as needed for Nausea    PANTOPRAZOLE (PROTONIX) 40 MG TABLET    Take 1 tablet by mouth every morning (before breakfast)    SENNA-DOCUSATE (PERICOLACE) 8.6-50 MG PER TABLET    Take 2 tablets by mouth daily    VITAMIN D (ERGOCALCIFEROL) 1.25 MG (77275 UT) CAPS CAPSULE    Take 1 capsule by mouth once a week for 12 doses     ALLERGIES     is allergic to nsaids, hydrocodone-acetaminophen, oxycodone-acetaminophen, and sulfamethoxazole-trimethoprim. FAMILY HISTORY     She indicated that her mother is . She indicated that the status of her father is unknown. She indicated that the status of her sister is unknown. She indicated that the status of her brother is unknown. SOCIAL HISTORY       Social History     Tobacco Use    Smoking status: Former     Types: Cigarettes     Quit date:      Years since quittin.8    Smokeless tobacco: Never   Vaping Use    Vaping Use: Former   Substance Use Topics    Alcohol use: Not Currently    Drug use: No     PHYSICAL EXAM     INITIAL VITALS: /88   Pulse 80   Temp 98.7 °F (37.1 °C) (Oral)   Resp 14   Ht 5' 6\" (1.676 m)   Wt 167 lb (75.8 kg)   SpO2 98%   BMI 26.95 kg/m²    Physical Exam  Vitals and nursing note reviewed. Constitutional:       General: She is not in acute distress. Appearance: She is well-developed. She is not diaphoretic. HENT:      Head: Normocephalic and atraumatic. Eyes:      Pupils: Pupils are equal, round, and reactive to light. Cardiovascular:      Rate and Rhythm: Normal rate and regular rhythm. Pulmonary:      Effort: Pulmonary effort is normal.      Breath sounds: Normal breath sounds. Abdominal:      General: Bowel sounds are normal.      Palpations: Abdomen is soft. Musculoskeletal:         General: Normal range of motion. Cervical back: Normal range of motion and neck supple. Skin:     General: Skin is warm. Capillary Refill: Capillary refill takes less than 2 seconds. Neurological:      Mental Status: She is alert and oriented to person, place, and time. MEDICAL DECISION MAKING:   Patient is a 74-year-old female who presented to the emergency department secondary to back pain. Orders for labs including x-ray of the lumbar spine. Patient was offered analgesia she declined. Patient will be reevaluated. No acute findings on laboratory analysis and urinalysis. Lumbar spine x-ray within normal limits. CT of the pelvis also obtained no acute findings. Results discussed with patient no acute findings on imaging or labs. She is discharged home with a prescription for Flexeril, outpatient follow-up and parameters to return to the emergency department. HEART SCORE:                                                                                                                                                                                                                                                                                                                                                                                                                                                  All patient's question's and concerns were answered prior to disposition and patient and/or family expressed understanding and agreement of treatment plan.         CRITICAL CARE:              NIH STROKE SCALE:            PROCEDURES:    Procedures    DIAGNOSTIC RESULTS   EKG:All EKG's are interpreted by the Emergency Department Physician who either signs or Co-signs this chart in the absence of a cardiologist.        RADIOLOGY:All plain film, CT, MRI, and formal ultrasound images (except ED bedside ultrasound) are read by the radiologist, see reports below, unless otherwisenoted in MDM or here. CT ABDOMEN PELVIS WO CONTRAST Additional Contrast? None   Final Result   No acute findings in the abdomen or pelvis      Interval performance of a gastric bypass. XR LUMBAR SPINE (2-3 VIEWS)   Final Result   Mild disc space narrowing at the L4-L5 level which could reflect disc bulging   or herniation. Mild multilevel facet arthropathy in the spine. LABS: All lab results were reviewed by myself, and all abnormals are listed below. Labs Reviewed   COMPREHENSIVE METABOLIC PANEL - Abnormal; Notable for the following components:       Result Value    Chloride 108 (*)     All other components within normal limits   URINALYSIS WITH MICROSCOPIC - Abnormal; Notable for the following components:    Bacteria, UA RARE (*)     All other components within normal limits   CBC WITH AUTO DIFFERENTIAL   LIPASE   AMYLASE   D-DIMER, QUANTITATIVE       EMERGENCY DEPARTMENTCOURSE:         Vitals:    Vitals:    11/06/22 1322   BP: 117/88   Pulse: 80   Resp: 14   Temp: 98.7 °F (37.1 °C)   TempSrc: Oral   SpO2: 98%   Weight: 167 lb (75.8 kg)   Height: 5' 6\" (1.676 m)       The patient was given the following medications while in the emergency department:  Orders Placed This Encounter   Medications    cyclobenzaprine (FLEXERIL) 10 MG tablet     Sig: Take 1 tablet by mouth nightly as needed for Muscle spasms     Dispense:  20 tablet     Refill:  0       CONSULTS:  None    FINAL IMPRESSION      1.  Flank pain          DISPOSITION/PLAN   DISPOSITION Decision To Discharge 11/06/2022 05:32:20 PM      PATIENT REFERRED TO:  Aniya Whitaker MD  [address] [place] [address]  [phone]        DISCHARGE MEDICATIONS:  New Prescriptions    CYCLOBENZAPRINE (FLEXERIL) 10 MG TABLET    Take 1 tablet by mouth nightly as needed for Muscle spasms     Piedad Casey MD  Attending Emergency Physician      The care is provided during an unprecedented national emergency due to the novel coronavirus, COVID 19. This note was created with the assistance of a speech-recognition program. While intending to generate a document that actually reflects the content of the visit, no guarantees can be provided that every mistake has been identified and corrected by editing.     Aneesh Lundy MD  21/51/93 [id number]

## 2022-11-07 ENCOUNTER — OFFICE VISIT (OUTPATIENT)
Dept: FAMILY MEDICINE CLINIC | Age: 52
End: 2022-11-07
Payer: COMMERCIAL

## 2022-11-07 VITALS
HEART RATE: 76 BPM | WEIGHT: 167 LBS | OXYGEN SATURATION: 98 % | TEMPERATURE: 97.3 F | BODY MASS INDEX: 26.84 KG/M2 | HEIGHT: 66 IN | DIASTOLIC BLOOD PRESSURE: 79 MMHG | SYSTOLIC BLOOD PRESSURE: 114 MMHG

## 2022-11-07 DIAGNOSIS — Z12.31 ENCOUNTER FOR SCREENING MAMMOGRAM FOR MALIGNANT NEOPLASM OF BREAST: ICD-10-CM

## 2022-11-07 DIAGNOSIS — Z13.220 ENCOUNTER FOR LIPID SCREENING FOR CARDIOVASCULAR DISEASE: ICD-10-CM

## 2022-11-07 DIAGNOSIS — Z00.01 ENCOUNTER FOR WELL ADULT EXAM WITH ABNORMAL FINDINGS: Primary | ICD-10-CM

## 2022-11-07 DIAGNOSIS — Z98.84 HX OF BARIATRIC SURGERY: ICD-10-CM

## 2022-11-07 DIAGNOSIS — R11.0 NAUSEA: ICD-10-CM

## 2022-11-07 DIAGNOSIS — Z13.6 ENCOUNTER FOR LIPID SCREENING FOR CARDIOVASCULAR DISEASE: ICD-10-CM

## 2022-11-07 DIAGNOSIS — S29.012A MUSCLE STRAIN OF RIGHT UPPER BACK, INITIAL ENCOUNTER: ICD-10-CM

## 2022-11-07 PROCEDURE — G8427 DOCREV CUR MEDS BY ELIG CLIN: HCPCS | Performed by: FAMILY MEDICINE

## 2022-11-07 PROCEDURE — G8419 CALC BMI OUT NRM PARAM NOF/U: HCPCS | Performed by: FAMILY MEDICINE

## 2022-11-07 PROCEDURE — G8484 FLU IMMUNIZE NO ADMIN: HCPCS | Performed by: FAMILY MEDICINE

## 2022-11-07 PROCEDURE — 3017F COLORECTAL CA SCREEN DOC REV: CPT | Performed by: FAMILY MEDICINE

## 2022-11-07 PROCEDURE — 99213 OFFICE O/P EST LOW 20 MIN: CPT | Performed by: FAMILY MEDICINE

## 2022-11-07 PROCEDURE — 1036F TOBACCO NON-USER: CPT | Performed by: FAMILY MEDICINE

## 2022-11-07 PROCEDURE — 99396 PREV VISIT EST AGE 40-64: CPT | Performed by: FAMILY MEDICINE

## 2022-11-07 RX ORDER — BACLOFEN 10 MG/1
10 TABLET ORAL 3 TIMES DAILY
Qty: 30 TABLET | Refills: 0 | Status: SHIPPED | OUTPATIENT
Start: 2022-11-07

## 2022-11-07 RX ORDER — ONDANSETRON 4 MG/1
4 TABLET, ORALLY DISINTEGRATING ORAL EVERY 8 HOURS PRN
Qty: 90 TABLET | Refills: 1 | Status: SHIPPED | OUTPATIENT
Start: 2022-11-07

## 2022-11-07 RX ORDER — DULOXETIN HYDROCHLORIDE 20 MG/1
20 CAPSULE, DELAYED RELEASE ORAL DAILY
Qty: 90 CAPSULE | Refills: 3 | Status: SHIPPED | OUTPATIENT
Start: 2022-11-07

## 2022-11-07 RX ORDER — LIDOCAINE 50 MG/G
1 PATCH TOPICAL DAILY
Qty: 30 PATCH | Refills: 0 | Status: SHIPPED | OUTPATIENT
Start: 2022-11-07 | End: 2022-12-07

## 2022-11-07 ASSESSMENT — PATIENT HEALTH QUESTIONNAIRE - PHQ9
8. MOVING OR SPEAKING SO SLOWLY THAT OTHER PEOPLE COULD HAVE NOTICED. OR THE OPPOSITE, BEING SO FIGETY OR RESTLESS THAT YOU HAVE BEEN MOVING AROUND A LOT MORE THAN USUAL: 0
SUM OF ALL RESPONSES TO PHQ QUESTIONS 1-9: 0
3. TROUBLE FALLING OR STAYING ASLEEP: 0
1. LITTLE INTEREST OR PLEASURE IN DOING THINGS: 0
2. FEELING DOWN, DEPRESSED OR HOPELESS: 0
SUM OF ALL RESPONSES TO PHQ QUESTIONS 1-9: 0
SUM OF ALL RESPONSES TO PHQ QUESTIONS 1-9: 0
7. TROUBLE CONCENTRATING ON THINGS, SUCH AS READING THE NEWSPAPER OR WATCHING TELEVISION: 0
4. FEELING TIRED OR HAVING LITTLE ENERGY: 0
5. POOR APPETITE OR OVEREATING: 0
10. IF YOU CHECKED OFF ANY PROBLEMS, HOW DIFFICULT HAVE THESE PROBLEMS MADE IT FOR YOU TO DO YOUR WORK, TAKE CARE OF THINGS AT HOME, OR GET ALONG WITH OTHER PEOPLE: 0
9. THOUGHTS THAT YOU WOULD BE BETTER OFF DEAD, OR OF HURTING YOURSELF: 0
6. FEELING BAD ABOUT YOURSELF - OR THAT YOU ARE A FAILURE OR HAVE LET YOURSELF OR YOUR FAMILY DOWN: 0
SUM OF ALL RESPONSES TO PHQ9 QUESTIONS 1 & 2: 0
SUM OF ALL RESPONSES TO PHQ QUESTIONS 1-9: 0

## 2022-11-07 NOTE — PROGRESS NOTES
Subjective:       Pinky Curry is a 50 y.o. female and is here for a comprehensive physical exam.  The patient reports problems -     R mid back discomfort especially when turning. She states that she did work on the faucet some weeks ago but this pain seemed to come back. She cannot take any NSAIDs because of her gastric bypass surgery. She has not tried any topical gels. She was on baclofen in the previous years which has helped with some back pain. H/o 08/2016 Roberto en Y. She has not had blood work done in many years. She is not on a bariatric vitamin.  History:  Any STD's in the past? none  Patient's medications, allergies, past medical, surgical, social and family histories were reviewed and updated as appropriate. Do you take any herbs or supplements that were not prescribed by a doctor? no  Are you taking calcium supplements? no  Are you taking aspirin daily? no    Review of Systems  Do you have pain that bothers you in your daily life? yes  Review of Systems   Constitutional: Negative for fever and unexpected weight change. Pertinent items are noted in HPI. Objective:   HENT:   /79   Pulse 76   Temp 97.3 °F (36.3 °C)   Ht 5' 6\" (1.676 m)   Wt 167 lb (75.8 kg)   SpO2 98%   BMI 26.95 kg/m²   Constitutional: Alert and oriented. Well-nourished. No distress. Head: Normocephalic and atraumatic. Right Ear: External ear normal. TM: no bulging, erythema or fluid seen. Left Ear: External ear normal. TM: no bulging, erythema or fluid seen. Nose: Nose normal.   Mouth/Throat: Oropharynx is clear and moist.  Dentures. Eyes: Pupils are equal, round, and reactive to light. Right eye exhibits no discharge. Left eye exhibits no discharge. No scleral icterus. Neck: Normal range of motion. Neck supple. No JVD present. No tracheal deviation present. No thyromegaly present. Cardiovascular: Normal rate, regular rhythm, normal heart sounds.     Pulmonary/Chest: Effort normal and breath sounds normal. No respiratory distress. She has no wheezes. She has no rales. Abdominal: Soft. Bowel sounds are normal.  She exhibits no distension and no mass. There is no tenderness. There is no rebound and no guarding. Musculoskeletal: Normal range of motion. She exhibits no edema. Tenderness with palpitation to soft tissue to lower right thoracic spine muscle area. Ramy Salm Lymphadenopathy:    She has no cervical adenopathy. Neurological:  She is alert and oriented to person, place, and time. Cranial nerves grossly intact. No sensation problem noted. Muscle strength 5/5 throughout. Skin: Skin is warm and dry. No rash noted. No erythema. Psychiatric:  She has a normal mood and affect. Behavior is normal.    Jerri Jaffe was seen today for new patient. Diagnoses and all orders for this visit:    Encounter for well adult exam with abnormal findings    Hx of bariatric surgery  -     ondansetron (ZOFRAN-ODT) 4 MG disintegrating tablet; Place 1 tablet under the tongue every 8 hours as needed for Nausea  -     [facility]    Nausea  Comments:  Chronic, intermittent, since gastric bypass. Continue Zofran as needed  Orders:  -     ondansetron (ZOFRAN-ODT) 4 MG disintegrating tablet; Place 1 tablet under the tongue every 8 hours as needed for Nausea    Muscle strain of right upper back, initial encounter  -     baclofen (LIORESAL) 10 MG tablet; Take 1 tablet by mouth 3 times daily  -     lidocaine (LIDODERM) 5 %; Place 1 patch onto the skin daily 12 hours on, 12 hours off. Encounter for screening mammogram for malignant neoplasm of breast  -     ECHO DIGITAL SCREEN W OR WO CAD BILATERAL; Future    Encounter for lipid screening for cardiovascular disease  -     [employer] Screen; Future    Other orders  -     DULoxetine (CYMBALTA) 20 MG extended release capsule; Take 1 capsule by mouth daily    Overall the patient is doing well.   She needs to see a weight loss specialist so she can have all her blood work done. I did order a yearly blood work. She will start baclofen for the muscle ache. Get the mammogram done. Medication refilled as requested. Call or return to clinic prn if these symptoms worsen or fail to improve as anticipated. I have reviewed the instructions with the patient, answering all questions to her satisfaction. Patient Counseling:  --Nutrition: Stressed importance of moderation in sodium/caffeine intake, saturated fat and cholesterol, caloric balance, sufficient intake of fresh fruits, vegetables, fiber, calcium, iron, and 1 mg of folate supplement per day (for females capable of pregnancy). --Exercise: Stressed the importance of regular exercise. --Substance Abuse: Discussed cessation/primary prevention of tobacco, alcohol, or other drug use; driving or other dangerous activities under the influence; availability of treatment for abuse. --Sexuality: Discussed sexually transmitted diseases, partner selection, use of condoms, avoidance of unintended pregnancy  and contraceptive alternatives. --Injury prevention: Discussed safety belts, safety helmets, smoke detector, smoking near bedding or upholstery. --Dental health: Discussed importance of regular tooth brushing, flossing, and dental visits. --Immunizations reviewed. --Discussed benefits of screening colonoscopy. --After hours service discussed with patient    Latasha Reddy received counseling on the following healthy behaviors: nutrition, exercise, and medication adherence  Reviewed prior labs and health maintenance. Continue current medications, diet and exercise. Discussed use, benefit, and side effects of prescribed medications. Barriers to medication compliance addressed. Patient given educational materials - see patient instructions. All patient questions answered. Patient voiced understanding.       Follow up in one year     (Please note that portions of this note were completed with a voice recognition program. Efforts were made to edit the dictations but occasionally words are mis-transcribed.)

## 2022-11-11 ENCOUNTER — TELEPHONE (OUTPATIENT)
Dept: FAMILY MEDICINE CLINIC | Age: 52
End: 2022-11-11

## 2022-11-11 RX ORDER — ACYCLOVIR 400 MG/1
400 TABLET ORAL
Qty: 50 TABLET | Refills: 3 | Status: SHIPPED | OUTPATIENT
Start: 2022-11-11 | End: 2022-11-21

## 2022-11-14 ENCOUNTER — TELEPHONE (OUTPATIENT)
Dept: FAMILY MEDICINE CLINIC | Age: 52
End: 2022-11-14

## 2022-11-14 RX ORDER — ACYCLOVIR 50 MG/G
OINTMENT TOPICAL
Qty: 30 G | Refills: 1 | Status: SHIPPED | OUTPATIENT
Start: 2022-11-14 | End: 2022-11-21

## 2022-12-08 ENCOUNTER — OFFICE VISIT (OUTPATIENT)
Dept: GASTROENTEROLOGY | Age: 52
End: 2022-12-08
Payer: COMMERCIAL

## 2022-12-08 VITALS
BODY MASS INDEX: 27 KG/M2 | OXYGEN SATURATION: 95 % | HEIGHT: 66 IN | SYSTOLIC BLOOD PRESSURE: 102 MMHG | DIASTOLIC BLOOD PRESSURE: 62 MMHG | HEART RATE: 72 BPM | WEIGHT: 168 LBS

## 2022-12-08 DIAGNOSIS — R11.0 NAUSEA: ICD-10-CM

## 2022-12-08 DIAGNOSIS — K59.00 CONSTIPATION, UNSPECIFIED CONSTIPATION TYPE: Primary | ICD-10-CM

## 2022-12-08 DIAGNOSIS — K59.09 CHRONIC CONSTIPATION: ICD-10-CM

## 2022-12-08 PROCEDURE — G8419 CALC BMI OUT NRM PARAM NOF/U: HCPCS | Performed by: INTERNAL MEDICINE

## 2022-12-08 PROCEDURE — 1036F TOBACCO NON-USER: CPT | Performed by: INTERNAL MEDICINE

## 2022-12-08 PROCEDURE — G8484 FLU IMMUNIZE NO ADMIN: HCPCS | Performed by: INTERNAL MEDICINE

## 2022-12-08 PROCEDURE — 99213 OFFICE O/P EST LOW 20 MIN: CPT | Performed by: INTERNAL MEDICINE

## 2022-12-08 PROCEDURE — 3017F COLORECTAL CA SCREEN DOC REV: CPT | Performed by: INTERNAL MEDICINE

## 2022-12-08 PROCEDURE — G8427 DOCREV CUR MEDS BY ELIG CLIN: HCPCS | Performed by: INTERNAL MEDICINE

## 2022-12-08 RX ORDER — AMOXICILLIN 250 MG
2 CAPSULE ORAL DAILY
Qty: 180 TABLET | Refills: 3 | Status: SHIPPED | OUTPATIENT
Start: 2022-12-08

## 2022-12-08 ASSESSMENT — ENCOUNTER SYMPTOMS
ABDOMINAL DISTENTION: 0
COUGH: 0
ABDOMINAL PAIN: 0
BLOOD IN STOOL: 0
TROUBLE SWALLOWING: 0
SHORTNESS OF BREATH: 0
CONSTIPATION: 1
CHOKING: 0
ANAL BLEEDING: 0
EYES NEGATIVE: 1
SORE THROAT: 0
ALLERGIC/IMMUNOLOGIC NEGATIVE: 1
DIARRHEA: 0
NAUSEA: 1
CHEST TIGHTNESS: 0

## 2022-12-08 NOTE — PROGRESS NOTES
Reason for Referral: GERD, nausea, constipation      No referring provider defined for this encounter. Chief Complaint   Patient presents with    Results     Dr. Rob Montague         HISTORY OF PRESENT ILLNESS: Ms.Cynthia Tyra Martinez is a 50 y.o. female with a past history remarkable for history of Roberto-en-Y gastric bypass, prior history of gastritis, chronic constipation, for history of H. pylori infection several years ago treated and eradicated, sleep apnea, referred for evaluation of chronic constipation. The patient was observed to have a hiatal hernia and a candycane pouch on previous upper endoscopy. Colonoscopy performed less than 3 years ago revealed no significant polyps or lesions. Patient currently reporting slow transit constipation. Suboptimal oral hydration with water. Smoker:  Drinking history:  Illicit drugs:   Abdominal surgeries: [facility]  Prior Colonoscopy:   Prior EGD: [facility],   FH of GI issues: Mother with diverticulitis. Past Medical,Family, and Social History reviewed and does contribute to the patient presentingcondition. Patient's PMH/PSH,SH,PSYCH Hx, MEDs, ALLERGIES, and ROS were all reviewed and updated in the appropriate sections. PAST MEDICAL HISTORY:  Past Medical History:   Diagnosis Date    Abnormal findings on esophagogastroduodenoscopy (EGD) 06/12/2020    Gastritis, tiny hiatal hernia, candy cane deformity of gastrojejunostomy    Arthritis     Chronic constipation     Gastritis     GERD (gastroesophageal reflux disease)     H. pylori infection     Headache(784.0)     Hiatal hernia     Tiny per EGD 6-12-20    Intertrigo 6/16/2021    Prolonged emergence from general anesthesia     Sleep apnea     Pt states has resolved since gastric bypass surgery.         Past Surgical History:   Procedure Laterality Date    ABDOMINOPLASTY N/A 11/4/2021    ABDOMINOPLASTY WITH BIOPATCH AROUND PANTERA DRAIN AND INCISIONAL WOUND VAC WITH PRE OP TAP BLOCK performed by Mason Gonzales MD at [facility] [address]  11/1991    COLONOSCOPY      COLONOSCOPY N/A 3/10/2021    COLONOSCOPY DIAGNOSTIC performed by Marian Sutherland MD at [address] [place] [address]  11/04/2021    PANNICULECTOMY (N/A Abdomen)     COSMETIC SURGERY  11/04/2021    ABDOMINOPLASTY WITH BIOPATCH AROUND PANTERA DRAIN AND INCISIONAL WOUND VAC     ENDOSCOPY, COLON, DIAGNOSTIC  06/12/2020    GASTRIC BYPASS SURGERY  2016    Roberto-en-Y    HYSTERECTOMY (CERVIX STATUS UNKNOWN)      LAPAROSCOPY      LEEP      LIPECTOMY N/A 11/4/2021    PANNICULECTOMY performed by Mason Gonzales MD at [address]:    Current Outpatient Medications:     senna-docusate (PERICOLACE) 8.6-50 MG per tablet, Take 2 tablets by mouth daily, Disp: 180 tablet, Rfl: 3    ondansetron (ZOFRAN-ODT) 4 MG disintegrating tablet, Place 1 tablet under the tongue every 8 hours as needed for Nausea, Disp: 90 tablet, Rfl: 1    DULoxetine (CYMBALTA) 20 MG extended release capsule, Take 1 capsule by mouth daily, Disp: 90 capsule, Rfl: 3    baclofen (LIORESAL) 10 MG tablet, Take 1 tablet by mouth 3 times daily, Disp: 30 tablet, Rfl: 0    pantoprazole (PROTONIX) 40 MG tablet, Take 1 tablet by mouth every morning (before breakfast), Disp: 90 tablet, Rfl: 5    butalbital-acetaminophen-caffeine (FIORICET, ESGIC) -40 MG per tablet, Take 1 tablet by mouth every 4 hours as needed for Headaches, Disp: 15 tablet, Rfl: 0    fluticasone (FLONASE) 50 MCG/ACT nasal spray, 2 sprays by Each Nostril route daily, Disp: 16 g, Rfl: 3    Calcium-Vitamin D-Vitamin K (CALCIUM SOFT CHEWS PO), Take by mouth 3 times daily, Disp: , Rfl:     vitamin D (ERGOCALCIFEROL) 1.25 MG (06347 UT) CAPS capsule, Take 1 capsule by mouth once a week for 12 doses, Disp: 12 capsule, Rfl: 0    lactobacillus (BACID) TABS, Take 1 tablet by mouth 3 times daily (with meals) (Patient not taking: No sig reported), Disp: 90 tablet, Rfl: 0    ALLERGIES:   Allergies   Allergen Reactions    Nsaids     Hydrocodone-Acetaminophen Nausea Only    Oxycodone-Acetaminophen Nausea Only    Sulfamethoxazole-Trimethoprim Hives and Rash       FAMILY HISTORY:       Problem Relation Age of Onset    Cancer Mother 64        ovarian cancer     High Cholesterol Father     Hypertension Father     Heart Disease Father     High Cholesterol Sister     Hypertension Sister     High Cholesterol Brother     Hypertension Brother          SOCIAL HISTORY:   Social History     Socioeconomic History    Marital status:      Spouse name: Not on file    Number of children: Not on file    Years of education: Not on file    Highest education level: Not on file   Occupational History    Not on file   Tobacco Use    Smoking status: Former     Packs/day: 1.00     Years: 30.00     Pack years: 30.00     Types: Cigarettes     Quit date:      Years since quittin.9    Smokeless tobacco: Never   Vaping Use    Vaping Use: Former   Substance and Sexual Activity    Alcohol use: Not Currently    Drug use: No    Sexual activity: Not on file   Other Topics Concern    Not on file   Social History Narrative    Not on file     Social Determinants of Health     Financial Resource Strain: Low Risk     Difficulty of Paying Living Expenses: Not hard at all   Food Insecurity: No Food Insecurity    Worried About Running Out of Food in the Last Year: Never true    Ran Out of Food in the Last Year: Never true   Transportation Needs: No Transportation Needs    Lack of Transportation (Medical): No    Lack of Transportation (Non-Medical): No   Physical Activity: Not on file   Stress: Not on file   Social Connections: Not on file   Intimate Partner Violence: Not on file   Housing Stability: Not on file         REVIEW OF SYSTEMS: A 12-point review of systems was obtained and pertinent positives and negatives were listed below.      REVIEW OF SYSTEMS:     Constitutional: No fever, no chills, no lethargy, no weakness. HEENT:  No headache, otalgia, itchy eyes, nasal discharge or sore throat. Cardiac:  No chest pain, dyspnea, orthopnea or PND. Chest:   No cough, phlegm or wheezing. Abdomen:      Detailed by MA   Neuro:  No focal weakness, abnormal movements or seizure like activity. Skin:   No rashes, no itching. :   No hematuria, no pyuria, no dysuria, no flank pain. Extremities:  No swelling or joint pains. ROS was otherwise negative    Review of Systems   Constitutional:  Negative for appetite change, fatigue and unexpected weight change. HENT:  Negative for sore throat and trouble swallowing. Eyes: Negative. Respiratory:  Negative for cough, choking, chest tightness and shortness of breath. Cardiovascular:  Negative for chest pain and leg swelling. Gastrointestinal:  Positive for constipation and nausea. Negative for abdominal distention, abdominal pain, anal bleeding, blood in stool and diarrhea. Endocrine: Negative. Genitourinary: Negative. Musculoskeletal: Negative. Skin: Negative. Allergic/Immunologic: Negative. Neurological:  Negative for dizziness, seizures, light-headedness, numbness and headaches. Hematological:  Does not bruise/bleed easily. Psychiatric/Behavioral:  Negative for behavioral problems and decreased concentration. PHYSICAL EXAMINATION: Vital signs reviewed per the nursing documentation. /62 (Site: Left Upper Arm, Position: Sitting, Cuff Size: Large Adult)   Pulse 72   Ht 5' 6\" (1.676 m)   Wt 168 lb (76.2 kg)   SpO2 95%   BMI 27.12 kg/m²   Body mass index is 27.12 kg/m². Physical Exam    Physical Exam   Constitutional: Patient is oriented to person, place, and time. Patient appears well-developed and well-nourished. HENT:   Head: Normocephalic and atraumatic. Eyes: Pupils are equal, round, and reactive to light. EOM are normal.   Neck: Normal range of motion. Neck supple. No JVD present.  No tracheal deviation present. No thyromegaly present. Cardiovascular: Normal rate, regular rhythm, normal heart sounds and intact distal pulses. Pulmonary/Chest: Effort normal and breath sounds normal. No stridor. No respiratory distress. He has no wheezes. He has no rales. He exhibits no tenderness. Abdominal: Soft. Bowel sounds are normal. He exhibits no distension and no mass. There is no tenderness. There is no rebound and no guarding. No hernia. Musculoskeletal: Normal range of motion. Lymphadenopathy:    Patient has no cervical adenopathy. Neurological: Patient is alert and oriented to person, place, and time. Psychiatric: Patient has a normal mood and affect. Patient behavior is normal.       LABORATORY DATA: Reviewed  Lab Results   Component Value Date    WBC 6.3 11/06/2022    HGB 14.4 11/06/2022    HCT 44.1 11/06/2022    MCV 87.2 11/06/2022     11/06/2022     11/06/2022    K 3.7 11/06/2022     (H) 11/06/2022    CO2 24 11/06/2022    BUN 8 11/06/2022    CREATININE 0.68 11/06/2022    LABALBU 4.2 11/06/2022    BILITOT 0.4 11/06/2022    ALKPHOS 77 11/06/2022    AST 17 11/06/2022    ALT 13 11/06/2022    INR 1.0 01/01/2022         Lab Results   Component Value Date    RBC 5.06 11/06/2022    HGB 14.4 11/06/2022    MCV 87.2 11/06/2022    MCH 28.5 11/06/2022    MCHC 32.7 11/06/2022    RDW 12.9 11/06/2022    MPV 9.4 11/06/2022    BASOPCT 1 11/06/2022    LYMPHSABS 2.09 11/06/2022    MONOSABS 0.51 11/06/2022    NEUTROABS 3.39 11/06/2022    EOSABS 0.21 11/06/2022    BASOSABS 0.06 11/06/2022         DIAGNOSTIC TESTING:     No results found. IMPRESSION: Kevin Dominguez is a 50 y.o. female with a past history remarkable for history of Roberto-en-Y gastric bypass, prior history of gastritis, chronic constipation, for history of H. pylori infection several years ago treated and eradicated, sleep apnea, referred for evaluation of chronic constipation.   The patient was observed to have a hiatal hernia and a candycane pouch on previous upper endoscopy. Colonoscopy performed less than 3 years ago revealed no significant polyps or lesions. Patient currently reporting slow transit constipation. Suboptimal oral hydration with water. Assessment  1. Constipation, unspecified constipation type    2. Chronic constipation    3. Nausea        Zion Fritz was seen today for results. Diagnoses and all orders for this visit:      Chronic constipation-we will place patient on Deborah-Colace 1 to 2 tablets daily, optimize oral hydration with minimum 64 ounce of water per day. Recommend dietary modifications to ensure that patient's diet induced with bowel movements. Electrolytes appear to be within normal limits. Previous thyroid testing was unremarkable. Patient does report irregular bowel habits with change in the shape of her stool at times. Will monitor after starting additional therapy. Currently the patient denies any significant upper GI symptoms that warrant changes in medications. Nausea to be addressed with intermittent use of Zofran and ongoing use of PPI therapy. -     senna-docusate (PERICOLACE) 8.6-50 MG per tablet; Take 2 tablets by mouth daily    Nausea  -     senna-docusate (PERICOLACE) 8.6-50 MG per tablet; Take 2 tablets by mouth daily             RTC: 3 months. Additional comments: Thank you for allowing me to participate in the care of Ms. Rosario. For any further questions please do not hesitate to contact me. I have reviewed and agree with the MA/LPN ROS please refer to their documentation from today's encounter on a separate note.      Migel Michaud MD, MPH   Board Certified in Gastroenterology  Board Certified in [address] [place] [address] #: [phone]          this note is created with the assistance of a speech recognition program.  While intending to generate a document that actually reflects the content of the visit, the document can still have some errors including those of syntax and sound a like substitutions which may escape proof reading. It such instances, actual meaning can be extrapolated by contextual diversion.

## 2022-12-15 ENCOUNTER — HOSPITAL ENCOUNTER (OUTPATIENT)
Age: 52
Setting detail: SPECIMEN
Discharge: HOME | End: 2022-12-15

## 2022-12-15 DIAGNOSIS — Z13.6 ENCOUNTER FOR LIPID SCREENING FOR CARDIOVASCULAR DISEASE: ICD-10-CM

## 2022-12-15 DIAGNOSIS — Z13.220 ENCOUNTER FOR LIPID SCREENING FOR CARDIOVASCULAR DISEASE: ICD-10-CM

## 2022-12-15 LAB
CHOLESTEROL/HDL RATIO: 3.9
CHOLESTEROL: 151 MG/DL
GLUCOSE BLD-MCNC: 93 MG/DL (ref 70–99)
HDLC SERPL-MCNC: 39 MG/DL
LDL CHOLESTEROL: 93 MG/DL (ref 0–130)
PATIENT FASTING?: ABNORMAL
TRIGL SERPL-MCNC: 94 MG/DL

## 2022-12-25 ENCOUNTER — PATIENT MESSAGE (OUTPATIENT)
Dept: FAMILY MEDICINE CLINIC | Age: 52
End: 2022-12-25

## 2022-12-26 ENCOUNTER — APPOINTMENT (OUTPATIENT)
Dept: GENERAL RADIOLOGY | Age: 52
End: 2022-12-26
Payer: COMMERCIAL

## 2022-12-26 ENCOUNTER — HOSPITAL ENCOUNTER (EMERGENCY)
Age: 52
Discharge: HOME | End: 2022-12-26
Attending: EMERGENCY MEDICINE
Payer: COMMERCIAL

## 2022-12-26 VITALS
HEART RATE: 102 BPM | WEIGHT: 167 LBS | BODY MASS INDEX: 26.84 KG/M2 | SYSTOLIC BLOOD PRESSURE: 124 MMHG | OXYGEN SATURATION: 96 % | HEIGHT: 66 IN | RESPIRATION RATE: 16 BRPM | TEMPERATURE: 100.2 F | DIASTOLIC BLOOD PRESSURE: 86 MMHG

## 2022-12-26 DIAGNOSIS — J10.1 INFLUENZA A: Primary | ICD-10-CM

## 2022-12-26 LAB
FLU A ANTIGEN: POSITIVE
FLU B ANTIGEN: NEGATIVE
SARS-COV-2, RAPID: NOT DETECTED
SPECIMEN DESCRIPTION: NORMAL

## 2022-12-26 PROCEDURE — 87804 INFLUENZA ASSAY W/OPTIC: CPT

## 2022-12-26 PROCEDURE — 6370000000 HC RX 637 (ALT 250 FOR IP): Performed by: PHYSICIAN ASSISTANT

## 2022-12-26 PROCEDURE — 99284 EMERGENCY DEPT VISIT MOD MDM: CPT

## 2022-12-26 PROCEDURE — 87635 SARS-COV-2 COVID-19 AMP PRB: CPT

## 2022-12-26 PROCEDURE — 71045 X-RAY EXAM CHEST 1 VIEW: CPT

## 2022-12-26 RX ORDER — BENZONATATE 100 MG/1
100 CAPSULE ORAL 3 TIMES DAILY PRN
Qty: 30 CAPSULE | Refills: 0 | Status: SHIPPED | OUTPATIENT
Start: 2022-12-26 | End: 2023-01-02

## 2022-12-26 RX ORDER — ACETAMINOPHEN 500 MG
1000 TABLET ORAL ONCE
Status: COMPLETED | OUTPATIENT
Start: 2022-12-26 | End: 2022-12-26

## 2022-12-26 RX ORDER — LORATADINE AND PSEUDOEPHEDRINE SULFATE 5; 120 MG/1; MG/1
1 TABLET, EXTENDED RELEASE ORAL 2 TIMES DAILY
Qty: 20 TABLET | Refills: 0 | Status: SHIPPED | OUTPATIENT
Start: 2022-12-26 | End: 2023-01-05

## 2022-12-26 RX ORDER — ALBUTEROL SULFATE 90 UG/1
2 AEROSOL, METERED RESPIRATORY (INHALATION) EVERY 4 HOURS PRN
Qty: 8.5 G | Refills: 0 | Status: SHIPPED | OUTPATIENT
Start: 2022-12-26

## 2022-12-26 RX ADMIN — ACETAMINOPHEN 1000 MG: 500 TABLET ORAL at 20:24

## 2022-12-26 ASSESSMENT — PAIN SCALES - GENERAL
PAINLEVEL_OUTOF10: 10
PAINLEVEL_OUTOF10: 7

## 2022-12-26 ASSESSMENT — PAIN - FUNCTIONAL ASSESSMENT: PAIN_FUNCTIONAL_ASSESSMENT: 0-10

## 2022-12-27 NOTE — TELEPHONE ENCOUNTER
From: Tonio Daveyelor  To: Dr. Waleska Andrade  Sent: 12/25/2022 5:48 PM EST  Subject: Sinus infection    HI Dr. Praveen Han, I have a horrible sinus infection. My forehead and face, behind my nose hurts so bad. Can you call me in an antibiotic. [facility] is closed so you can send it to [facility] on [address]. I am allergic to sulfa.    Thank you!!

## 2022-12-27 NOTE — ED PROVIDER NOTES
eMERGENCY dEPARTMENT eNCOUnter   Independent Attestation     Pt Name: Memo Villagran  MRN: [id number]  [place] 1970  Date of evaluation: 12/26/22     Memo Villagran is a 50 y.o. female with CC: Cough, Generalized Body Aches, Dizziness, and Concern For COVID-19        This visit was performed by both a physician and an APC. I performed all aspects of the MDM as documented.       The care is provided during an unprecedented national emergency due to the novel coronavirus, Ivin Buerger, MD  Attending Emergency Physician           Daylin Doyle MD  12/26/22 [id number]

## 2022-12-27 NOTE — ED PROVIDER NOTES
[address] [place] [address] ED  [facility]      Pt Name: Keven Echeverria  MRN: [id number]  [place] 1970  Date ofevaluation: 12/26/2022  Provider: Kyaw Yanes Dr       Chief Complaint   Patient presents with    Cough    Generalized Body Aches    Dizziness    Concern For COVID-19         HISTORY OF PRESENT ILLNESS  (Location/Symptom, Timing/Onset, Context/Setting, Quality, Duration, Modifying Factors, Severity.)   Keven Echeverria is a 50 y.o. female who presents to the emergency department with cough and body aches dizziness soreness over the last 2 days. Runny nose congestion is present. Patient denies any nausea vomiting diarrhea. Reported fevers. Concerned about COVID-19. Symptom described as moderate constant. No alleviating aggravating factors. No complaints. Nursing Notes were reviewed.     ALLERGIES     Nsaids, Hydrocodone-acetaminophen, Oxycodone-acetaminophen, and Sulfamethoxazole-trimethoprim    CURRENT MEDICATIONS       Discharge Medication List as of 12/26/2022  8:29 PM        CONTINUE these medications which have NOT CHANGED    Details   senna-docusate (PERICOLACE) 8.6-50 MG per tablet Take 2 tablets by mouth daily, Disp-180 tablet, R-3Normal      ondansetron (ZOFRAN-ODT) 4 MG disintegrating tablet Place 1 tablet under the tongue every 8 hours as needed for Nausea, Disp-90 tablet, R-1Normal      DULoxetine (CYMBALTA) 20 MG extended release capsule Take 1 capsule by mouth daily, Disp-90 capsule, R-3Normal      baclofen (LIORESAL) 10 MG tablet Take 1 tablet by mouth 3 times daily, Disp-30 tablet, R-0Normal      pantoprazole (PROTONIX) 40 MG tablet Take 1 tablet by mouth every morning (before breakfast), Disp-90 tablet, R-5Normal      vitamin D (ERGOCALCIFEROL) 1.25 MG (36144 UT) CAPS capsule Take 1 capsule by mouth once a week for 12 doses, Disp-12 capsule, R-0Normal      butalbital-acetaminophen-caffeine (FIORICET, ESGIC) -40 MG per tablet Take 1 tablet by mouth every 4 hours as needed for Headaches, Disp-15 tablet, R-0Normal      lactobacillus (BACID) TABS Take 1 tablet by mouth 3 times daily (with meals), Disp-90 tablet, R-0Normal      fluticasone (FLONASE) 50 MCG/ACT nasal spray 2 sprays by Each Nostril route daily, Disp-16 g, R-3Normal      Calcium-Vitamin D-Vitamin K (CALCIUM SOFT CHEWS PO) Take by mouth 3 times dailyHistorical Med             PAST MEDICAL HISTORY         Diagnosis Date    Abnormal findings on esophagogastroduodenoscopy (EGD) 06/12/2020    Gastritis, tiny hiatal hernia, candy cane deformity of gastrojejunostomy    Arthritis     Chronic constipation     Gastritis     GERD (gastroesophageal reflux disease)     H. pylori infection     Headache(784.0)     Hiatal hernia     Tiny per EGD 6-12-20    Intertrigo 6/16/2021    Prolonged emergence from general anesthesia     Sleep apnea     Pt states has resolved since gastric bypass surgery.         SURGICAL HISTORY           Procedure Laterality Date    ABDOMINOPLASTY N/A 11/4/2021    ABDOMINOPLASTY WITH BIOPATCH AROUND PANTERA DRAIN AND INCISIONAL WOUND VAC WITH PRE OP TAP BLOCK performed by Rolo Fournier MD at [place] [address]  11/1991    COLONOSCOPY      COLONOSCOPY N/A 3/10/2021    COLONOSCOPY DIAGNOSTIC performed by Mónica Garcia MD at [address] [place] [address]  11/04/2021    PANNICULECTOMY (N/A Abdomen)     COSMETIC SURGERY  11/04/2021    ABDOMINOPLASTY WITH BIOPATCH AROUND PANTERA DRAIN AND INCISIONAL WOUND VAC     ENDOSCOPY, COLON, DIAGNOSTIC  06/12/2020    GASTRIC BYPASS SURGERY  2016    Roberto-en-Y    HYSTERECTOMY (CERVIX STATUS UNKNOWN)      LAPAROSCOPY      LEEP      LIPECTOMY N/A 11/4/2021    PANNICULECTOMY performed by Rloo Fournier MD at [address] [place] [address]           HISTORY           Problem Relation Age of Onset    Cancer Mother 64        ovarian cancer     High Cholesterol Father     Hypertension Father     Heart Disease Father     High Cholesterol Sister     Hypertension Sister     High Cholesterol Brother     Hypertension Brother      Family Status   Relation Name Status    Mother      Father  (Not Specified)    Sister  (Not Specified)    Brother  (Not Specified)        SOCIAL HISTORY      reports that she quit smoking about 9 years ago. Her smoking use included cigarettes. She has a 30.00 pack-year smoking history. She has never used smokeless tobacco. She reports that she does not currently use alcohol. She reports that she does not use drugs. REVIEW OFSYSTEMS    (2-9 systems for level 4, 10 or more for level 5)   Review of Systems    Except as noted above the remainder of the review of systems was reviewed and negative. PHYSICAL EXAM    (up to 7 for level 4, 8 or more for level 5)     ED Triage Vitals [22 1718]   BP Temp Temp Source Heart Rate Resp SpO2 Height Weight   124/86 100.2 °F (37.9 °C) Oral (!) 102 16 96 % 5' 6\" (1.676 m) 167 lb (75.8 kg)     Physical Exam  Constitutional:       Appearance: She is well-developed. HENT:      Head: Normocephalic and atraumatic. Cardiovascular:      Rate and Rhythm: Normal rate and regular rhythm. Pulmonary:      Effort: Pulmonary effort is normal.      Breath sounds: Normal breath sounds. Abdominal:      General: There is no distension. Palpations: Abdomen is soft. Tenderness: There is no abdominal tenderness. Musculoskeletal:         General: Normal range of motion. Cervical back: Normal range of motion and neck supple. Skin:     General: Skin is warm. Findings: No rash. Neurological:      Mental Status: She is alert and oriented to person, place, and time.    Psychiatric:         Behavior: Behavior normal.               DIAGNOSTIC RESULTS     EKG: All EKG's are interpreted by the Emergency Department Physician who either signs or Co-signs this chart in the absence of a cardiologist.        RADIOLOGY:   Non-plain film images such as CT, Ultrasound and MRI are read by the radiologist. Plain radiographic images arevisualized and preliminarily interpreted by the emergency physician with the below findings:        Interpretation per the Radiologist below, if available at thetime of this note:          ED BEDSIDE ULTRASOUND:   Performed by ED Physician - none    LABS:  Labs Reviewed   RAPID INFLUENZA A/B ANTIGENS - Abnormal; Notable for the following components:       Result Value    Flu A Antigen POSITIVE (*)     All other components within normal limits   COVID-19, RAPID       All other labs were within normal range or not returned as of this dictation. EMERGENCY DEPARTMENT COURSE and DIFFERENTIAL DIAGNOSIS/MDM:   Vitals:    Vitals:    12/26/22 1718   BP: 124/86   Pulse: (!) 102   Resp: 16   Temp: 100.2 °F (37.9 °C)   TempSrc: Oral   SpO2: 96%   Weight: 167 lb (75.8 kg)   Height: 5' 6\" (1.676 m)     HEARTSCORE: Not indicated    Chest x-ray clear for any type of pneumonia. Flu swab positive. COVID swab negative. Patient will be discharged home symptomatic management and outpatient follow-up. CONSULTS:  None    PROCEDURES:  Procedures        FINAL IMPRESSION      1.  Influenza A          DISPOSITION/PLAN   DISPOSITION Decision To Discharge 12/26/2022 08:28:05 PM      PATIENTREFERRED TO:   Simone Arzate MD  [address] [place] [address]  [phone]    In 3 days      DISCHARGE MEDICATIONS:     Discharge Medication List as of 12/26/2022  8:29 PM        START taking these medications    Details   benzonatate (TESSALON PERLES) 100 MG capsule Take 1 capsule by mouth 3 times daily as needed for Cough, Disp-30 capsule, R-0Normal      loratadine-pseudoephedrine (CLARITIN-D 12 HOUR) 5-120 MG per extended release tablet Take 1 tablet by mouth 2 times daily for 10 days, Disp-20 tablet, R-0Normal      albuterol sulfate HFA (PROAIR HFA) 108 (90 Base) MCG/ACT inhaler Inhale 2 puffs into the lungs every 4 hours as needed for Wheezing or Shortness of Breath, Disp-8.5 g, R-0Normal                 (Please note that portions of this note were completed with a voice recognition program.  Efforts were made to edit thedictations but occasionally words are mis-transcribed.)    ENMANUEL Patel PA-C  12/26/22 2029       Polly Braun PA-C  12/29/22 1321

## 2022-12-27 NOTE — ED NOTES
Pt arrived to ED with c/o cough, body aches, and dizziness. Pt is concerned for Covid. Pt states she started feeling sick on Jessica sav after all her family left. Asked pt if she has been exposed to anyone that was sick and she states all her family is sick. Pt is A&O x4. Will continue to monitor pt.       Reynaldo Alvarado RN  12/26/22 3231

## 2022-12-27 NOTE — DISCHARGE INSTRUCTIONS
Take meds as prescribed and take tylenol every 4 -6 hours for fever and body aches. Follow up with doctor  in 3 -4 days.   Return to ER immediately if symptoms worsen or persist.

## 2023-01-11 ENCOUNTER — OFFICE VISIT (OUTPATIENT)
Dept: SURGERY | Age: 53
End: 2023-01-11
Payer: COMMERCIAL

## 2023-01-11 VITALS — BODY MASS INDEX: 26.58 KG/M2 | HEIGHT: 66 IN | WEIGHT: 165.4 LBS

## 2023-01-11 DIAGNOSIS — Z98.890 STATUS POST ABDOMINOPLASTY: Primary | ICD-10-CM

## 2023-01-11 PROCEDURE — 3017F COLORECTAL CA SCREEN DOC REV: CPT | Performed by: PLASTIC SURGERY

## 2023-01-11 PROCEDURE — 99213 OFFICE O/P EST LOW 20 MIN: CPT | Performed by: PLASTIC SURGERY

## 2023-01-11 PROCEDURE — 1036F TOBACCO NON-USER: CPT | Performed by: PLASTIC SURGERY

## 2023-01-11 PROCEDURE — G8484 FLU IMMUNIZE NO ADMIN: HCPCS | Performed by: PLASTIC SURGERY

## 2023-01-11 PROCEDURE — G8427 DOCREV CUR MEDS BY ELIG CLIN: HCPCS | Performed by: PLASTIC SURGERY

## 2023-01-11 PROCEDURE — G8419 CALC BMI OUT NRM PARAM NOF/U: HCPCS | Performed by: PLASTIC SURGERY

## 2023-01-11 NOTE — PROGRESS NOTES
[address] [facility]  [address] [place] [address],[address]  [address] [facility] [address]       Office Progress Note     Chief Complaint   Patient presents with    Follow-up     Panni 11/4/21 feels \"tight\" in some areas around incision        HPI:   Kristy Lynne is a 50 y.o. female who presents         panniculectommy DOS 11/4/21       SUBJECTIVE  Patient seen and examined. S/p panniculectomy and abdominoplasty. The pannus weight 7.156 pounds. The pathology was consistent with skin and subcutaneous tissue. Patient is very happy with the results.   Medications:     Current Outpatient Medications   Medication Sig Dispense Refill    albuterol sulfate HFA (PROAIR HFA) 108 (90 Base) MCG/ACT inhaler Inhale 2 puffs into the lungs every 4 hours as needed for Wheezing or Shortness of Breath 8.5 g 0    senna-docusate (PERICOLACE) 8.6-50 MG per tablet Take 2 tablets by mouth daily 180 tablet 3    ondansetron (ZOFRAN-ODT) 4 MG disintegrating tablet Place 1 tablet under the tongue every 8 hours as needed for Nausea 90 tablet 1    DULoxetine (CYMBALTA) 20 MG extended release capsule Take 1 capsule by mouth daily 90 capsule 3    baclofen (LIORESAL) 10 MG tablet Take 1 tablet by mouth 3 times daily 30 tablet 0    pantoprazole (PROTONIX) 40 MG tablet Take 1 tablet by mouth every morning (before breakfast) 90 tablet 5    butalbital-acetaminophen-caffeine (FIORICET, ESGIC) -40 MG per tablet Take 1 tablet by mouth every 4 hours as needed for Headaches 15 tablet 0    lactobacillus (BACID) TABS Take 1 tablet by mouth 3 times daily (with meals) 90 tablet 0    fluticasone (FLONASE) 50 MCG/ACT nasal spray 2 sprays by Each Nostril route daily 16 g 3    Calcium-Vitamin D-Vitamin K (CALCIUM SOFT CHEWS PO) Take by mouth 3 times daily      vitamin D (ERGOCALCIFEROL) 1.25 MG (72407 UT) CAPS capsule Take 1 capsule by mouth once a week for 12 doses 12 capsule 0     No current facility-administered medications for this visit. Allergies: Allergies   Allergen Reactions    Nsaids     Hydrocodone-Acetaminophen Nausea Only    Oxycodone-Acetaminophen Nausea Only    Sulfamethoxazole-Trimethoprim Hives and Rash        Past Medical History:   Diagnosis Date    Abnormal findings on esophagogastroduodenoscopy (EGD) 06/12/2020    Gastritis, tiny hiatal hernia, candy cane deformity of gastrojejunostomy    Arthritis     Chronic constipation     Gastritis     GERD (gastroesophageal reflux disease)     H. pylori infection     Headache(784.0)     Hiatal hernia     Tiny per EGD 6-12-20    Intertrigo 6/16/2021    Prolonged emergence from general anesthesia     Sleep apnea     Pt states has resolved since gastric bypass surgery.       Past Surgical History:   Procedure Laterality Date    ABDOMINOPLASTY N/A 11/4/2021    ABDOMINOPLASTY WITH BIOPATCH AROUND PANTERA DRAIN AND INCISIONAL WOUND VAC WITH PRE OP TAP BLOCK performed by Ailyn Velazquez MD at [place] [address]  11/1991    COLONOSCOPY      COLONOSCOPY N/A 3/10/2021    COLONOSCOPY DIAGNOSTIC performed by Shey Newell MD at [address] [place] [address]  11/04/2021    PANNICULECTOMY (N/A Abdomen)     COSMETIC SURGERY  11/04/2021    ABDOMINOPLASTY WITH BIOPATCH AROUND PANTERA DRAIN AND INCISIONAL WOUND VAC     ENDOSCOPY, COLON, DIAGNOSTIC  06/12/2020    GASTRIC BYPASS SURGERY  2016    Roberto-en-Y    HYSTERECTOMY (CERVIX STATUS UNKNOWN)      LAPAROSCOPY      LEEP      LIPECTOMY N/A 11/4/2021    PANNICULECTOMY performed by Ailyn Velazquez MD at [address] [place] [address]       Social History     Socioeconomic History    Marital status:      Spouse name: Not on file    Number of children: Not on file    Years of education: Not on file    Highest education level: Not on file   Occupational History    Not on file   Tobacco Use    Smoking status: Former     Packs/day: 1.00     Years: 30.00     Pack years: 30.00     Types: Cigarettes     Quit date: 2013     Years since quitting: 10.0    Smokeless tobacco: Never   Vaping Use    Vaping Use: Former   Substance and Sexual Activity    Alcohol use: Not Currently    Drug use: No    Sexual activity: Not on file   Other Topics Concern    Not on file   Social History Narrative    Not on file     Social Determinants of Health     Financial Resource Strain: Low Risk     Difficulty of Paying Living Expenses: Not hard at all   Food Insecurity: No Food Insecurity    Worried About Running Out of Food in the Last Year: Never true    Ran Out of Food in the Last Year: Never true   Transportation Needs: No Transportation Needs    Lack of Transportation (Medical): No    Lack of Transportation (Non-Medical): No   Physical Activity: Not on file   Stress: Not on file   Social Connections: Not on file   Intimate Partner Violence: Not on file   Housing Stability: Not on file     Family History   Problem Relation Age of Onset    Cancer Mother 56        ovarian cancer     High Cholesterol Father     Hypertension Father     Heart Disease Father     High Cholesterol Sister     Hypertension Sister     High Cholesterol Brother     Hypertension Brother      Review of Systems:     Constitutional: Negative for fever, chills, fatigue and unexpected weight change.   HENT: Negative for hearing loss, sore throat and facial swelling.    Eyes: Negative for pain and discharge.   Respiratory: Negative for cough and shortness of breath.    Cardiovascular: Negative for chest pain.   Gastrointestinal: Negative for nausea, vomiting, diarrhea and constipation.   Skin: Negative for pallor and rash.   Neurological: Negative for seizures, syncope and numbness.   Hematological: Does not bruise/bleed easily.   Psychiatric/Behavioral: Negative for behavioral problems. The patient is not nervous/anxious.     Physical Exam:   Ht 5' 6\" (1.676 m)   Wt 165 lb 6.4 oz (75 kg)   BMI 26.70 kg/m²    Body mass index is 26.7  kg/m². Physical Exam   Constitutional: Oriented to person, place, and time. Appears well-developed and well-nourished. No distress. HEENT: Normocephalic and atraumatic. External ears within normal limits. Extraocular muscles are intact. Conjunctivae were anicteric. Pulmonary/Chest: Effort normal. No respiratory distress. Neurological: Alert and oriented to person, place, and time. Skin: Skin is warm and dry. No rash noted. Extremities:  Moves all extremities. Abdomen:  Soft. Psychiatric: Normal mood and affect. Behavior is normal.    Labs:   @LASTLABM(1m)@    Imaging:   No results found. Impression/Plan:      Diagnosis Orders   1. Status post abdominoplasty              Plan:  Patient status post abdominoplasty panniculectomy on 11/4/2021  Continue massaging the dogears. Patient is to resume going to the gym and exercising. Patient is to follow-up in 3 months. Will discuss possible liposuction. Patient wants to hold off on that until she determines her results from the gym. Greater than 20 minutes was discussed with the patient regarding her options.         Electronically signed by:  Tee Higuera M.D.   1/11/2023

## 2023-01-24 ENCOUNTER — OFFICE VISIT (OUTPATIENT)
Dept: FAMILY MEDICINE CLINIC | Age: 53
End: 2023-01-24
Payer: COMMERCIAL

## 2023-01-24 VITALS
HEART RATE: 85 BPM | SYSTOLIC BLOOD PRESSURE: 112 MMHG | BODY MASS INDEX: 26.47 KG/M2 | OXYGEN SATURATION: 98 % | DIASTOLIC BLOOD PRESSURE: 75 MMHG | WEIGHT: 164 LBS | TEMPERATURE: 97.3 F

## 2023-01-24 DIAGNOSIS — R45.4 IRRITABILITY: Primary | ICD-10-CM

## 2023-01-24 PROCEDURE — G8419 CALC BMI OUT NRM PARAM NOF/U: HCPCS | Performed by: FAMILY MEDICINE

## 2023-01-24 PROCEDURE — G8484 FLU IMMUNIZE NO ADMIN: HCPCS | Performed by: FAMILY MEDICINE

## 2023-01-24 PROCEDURE — 1036F TOBACCO NON-USER: CPT | Performed by: FAMILY MEDICINE

## 2023-01-24 PROCEDURE — G8427 DOCREV CUR MEDS BY ELIG CLIN: HCPCS | Performed by: FAMILY MEDICINE

## 2023-01-24 PROCEDURE — 99213 OFFICE O/P EST LOW 20 MIN: CPT | Performed by: FAMILY MEDICINE

## 2023-01-24 PROCEDURE — 3017F COLORECTAL CA SCREEN DOC REV: CPT | Performed by: FAMILY MEDICINE

## 2023-01-24 RX ORDER — DULOXETIN HYDROCHLORIDE 30 MG/1
30 CAPSULE, DELAYED RELEASE ORAL DAILY
Qty: 30 CAPSULE | Refills: 3 | Status: SHIPPED | OUTPATIENT
Start: 2023-01-24

## 2023-01-24 ASSESSMENT — PATIENT HEALTH QUESTIONNAIRE - PHQ9
5. POOR APPETITE OR OVEREATING: 0
4. FEELING TIRED OR HAVING LITTLE ENERGY: 0
2. FEELING DOWN, DEPRESSED OR HOPELESS: 0
SUM OF ALL RESPONSES TO PHQ9 QUESTIONS 1 & 2: 0
3. TROUBLE FALLING OR STAYING ASLEEP: 0
6. FEELING BAD ABOUT YOURSELF - OR THAT YOU ARE A FAILURE OR HAVE LET YOURSELF OR YOUR FAMILY DOWN: 0
SUM OF ALL RESPONSES TO PHQ QUESTIONS 1-9: 0
9. THOUGHTS THAT YOU WOULD BE BETTER OFF DEAD, OR OF HURTING YOURSELF: 0
10. IF YOU CHECKED OFF ANY PROBLEMS, HOW DIFFICULT HAVE THESE PROBLEMS MADE IT FOR YOU TO DO YOUR WORK, TAKE CARE OF THINGS AT HOME, OR GET ALONG WITH OTHER PEOPLE: 0
7. TROUBLE CONCENTRATING ON THINGS, SUCH AS READING THE NEWSPAPER OR WATCHING TELEVISION: 0
SUM OF ALL RESPONSES TO PHQ QUESTIONS 1-9: 0
8. MOVING OR SPEAKING SO SLOWLY THAT OTHER PEOPLE COULD HAVE NOTICED. OR THE OPPOSITE, BEING SO FIGETY OR RESTLESS THAT YOU HAVE BEEN MOVING AROUND A LOT MORE THAN USUAL: 0
1. LITTLE INTEREST OR PLEASURE IN DOING THINGS: 0
SUM OF ALL RESPONSES TO PHQ QUESTIONS 1-9: 0
SUM OF ALL RESPONSES TO PHQ QUESTIONS 1-9: 0

## 2023-01-24 NOTE — PROGRESS NOTES
General FM note    Saurabh Howe is a 50 y.o. female who presents today for follow up on her  medical conditions as noted below. Saurabh Howe is c/o of   Chief Complaint   Patient presents with    Anxiety       Patient Active Problem List:     Hx of bariatric surgery     Anxiety     Nausea     Pulmonary embolism without acute cor pulmonale (HCC)     Vitamin D deficiency     Pneumonia due to infectious organism     Human metapneumovirus pneumonia     Coronavirus -46 E     Recurrent major depressive disorder, in remission [facility])     Past Medical History:   Diagnosis Date    Abnormal findings on esophagogastroduodenoscopy (EGD) 06/12/2020    Gastritis, tiny hiatal hernia, candy cane deformity of gastrojejunostomy    Arthritis     Chronic constipation     Gastritis     GERD (gastroesophageal reflux disease)     H. pylori infection     Headache(784.0)     Hiatal hernia     Tiny per EGD 6-12-20    Intertrigo 6/16/2021    Prolonged emergence from general anesthesia     Sleep apnea     Pt states has resolved since gastric bypass surgery.        Past Surgical History:   Procedure Laterality Date    ABDOMINOPLASTY N/A 11/4/2021    ABDOMINOPLASTY WITH BIOPATCH AROUND PANTERA DRAIN AND INCISIONAL WOUND VAC WITH PRE OP TAP BLOCK performed by Africa Pedro MD at [place] [address]  11/1991    COLONOSCOPY      COLONOSCOPY N/A 3/10/2021    COLONOSCOPY DIAGNOSTIC performed by Chalo Ba MD at [address] [place] [address]  11/04/2021    PANNICULECTOMY (N/A Abdomen)     COSMETIC SURGERY  11/04/2021    ABDOMINOPLASTY WITH BIOPATCH AROUND PANTERA DRAIN AND INCISIONAL WOUND VAC     ENDOSCOPY, COLON, DIAGNOSTIC  06/12/2020    GASTRIC BYPASS SURGERY  2016    Roberto-en-Y    HYSTERECTOMY (CERVIX STATUS UNKNOWN)      LAPAROSCOPY      LEEP      LIPECTOMY N/A 11/4/2021    PANNICULECTOMY performed by Africa Pedro MD at [address] [place] [address]       Family History   Problem Relation Age of Onset    Cancer Mother 64        ovarian cancer     High Cholesterol Father     Hypertension Father     Heart Disease Father     High Cholesterol Sister     Hypertension Sister     High Cholesterol Brother     Hypertension Brother      Current Outpatient Medications   Medication Sig Dispense Refill    DULoxetine (CYMBALTA) 30 MG extended release capsule Take 1 capsule by mouth daily 30 capsule 3    albuterol sulfate HFA (PROAIR HFA) 108 (90 Base) MCG/ACT inhaler Inhale 2 puffs into the lungs every 4 hours as needed for Wheezing or Shortness of Breath 8.5 g 0    senna-docusate (PERICOLACE) 8.6-50 MG per tablet Take 2 tablets by mouth daily 180 tablet 3    ondansetron (ZOFRAN-ODT) 4 MG disintegrating tablet Place 1 tablet under the tongue every 8 hours as needed for Nausea 90 tablet 1    baclofen (LIORESAL) 10 MG tablet Take 1 tablet by mouth 3 times daily 30 tablet 0    pantoprazole (PROTONIX) 40 MG tablet Take 1 tablet by mouth every morning (before breakfast) 90 tablet 5    butalbital-acetaminophen-caffeine (FIORICET, ESGIC) -40 MG per tablet Take 1 tablet by mouth every 4 hours as needed for Headaches 15 tablet 0    lactobacillus (BACID) TABS Take 1 tablet by mouth 3 times daily (with meals) 90 tablet 0    fluticasone (FLONASE) 50 MCG/ACT nasal spray 2 sprays by Each Nostril route daily 16 g 3    Calcium-Vitamin D-Vitamin K (CALCIUM SOFT CHEWS PO) Take by mouth 3 times daily      vitamin D (ERGOCALCIFEROL) 1.25 MG (24642 UT) CAPS capsule Take 1 capsule by mouth once a week for 12 doses 12 capsule 0     No current facility-administered medications for this visit.      ALLERGIES:    Allergies   Allergen Reactions    Nsaids     Hydrocodone-Acetaminophen Nausea Only    Oxycodone-Acetaminophen Nausea Only    Sulfamethoxazole-Trimethoprim Hives and Rash       Social History     Tobacco Use    Smoking status: Former     Packs/day: 1.00     Years: 30.00     Pack years: 30.00     Types: Cigarettes     Quit date: 2013     Years since quitting: 10.0    Smokeless tobacco: Never   Substance Use Topics    Alcohol use: Not Currently      Body mass index is 26.47 kg/m². /75   Pulse 85   Temp 97.3 °F (36.3 °C)   Wt 164 lb (74.4 kg)   SpO2 98%   BMI 26.47 kg/m²     Subjective:      HPI    50 y.o. female coming today with concerns about irritability/anger. She states at work she does not have any complaints. She is very calm but when getting home and she is confronted with her house especially the work which her grandson for instance did not do she tells me that she \"explodes \"very quick that she Georzaraan Scales get upset very quickly and she does not want to expose her family to this behavior. She has been on Cymbalta 20 mg daily for fibromyalgia and she says it has been working. Review of Systems   Constitutional: Negative for fever and unexpected weight change. Pertinent items are noted in HPI. Objective:   Physical Exam  Constitutional: VS (see above). General appearance: normal development, habitus and attention, no deformities. No distress. Eyes: normal conjunctiva and lids. CAV: RRR, no RMG. No edema lower extremities. Pulmo: CTA bilateral, no CWR. Skin: no rashes, lesions or ulcers. Musculoskeletal: normal gait. Nails: no clubbing or cyanosis. Psychiatric: alert and oriented to place, time and person. Normal mood and affect. Assessment:       Diagnosis Orders   1. Irritability            Plan:   I discussed with patient and will increase the Cymbalta to 30 mg daily. She will let me know in the next 2 to 3 weeks to see if helps a lot. I do not want to start her on any atypical antipsychotic because of the weight gain side effect. Return in about 6 months (around 7/24/2023), or if symptoms worsen or fail to improve. No orders of the defined types were placed in this encounter.     Orders Placed This Encounter   Medications    DULoxetine (CYMBALTA) 30 MG extended release capsule     Sig: Take 1 capsule by mouth daily     Dispense:  30 capsule     Refill:  3       Call or return to clinic prn if these symptoms worsen or fail to improve as anticipated. I have reviewed the instructions with the patient, answering all questions to patient's satisfaction. Monica Moreno received counseling on the following healthy behaviors: nutrition, exercise, and medication adherence  Reviewed prior labs and health maintenance. Continue current medications, diet and exercise. Discussed use, benefit, and side effects of prescribed medications. Barriers to medication compliance addressed. Patient given educational materials - see patient instructions. All patient questions answered. Patient voiced understanding.       Electronically signed by Amanda Sharma MD on 1/25/2023 at 6:35 AM       (Please note that portions of this note were completed with a voice recognition program. Efforts were made to edit the dictations but occasionally words are mis-transcribed.)

## 2023-02-18 ENCOUNTER — HOSPITAL ENCOUNTER (OUTPATIENT)
Dept: MAMMOGRAPHY | Age: 53
End: 2023-02-18
Payer: COMMERCIAL

## 2023-02-18 DIAGNOSIS — Z12.31 ENCOUNTER FOR SCREENING MAMMOGRAM FOR MALIGNANT NEOPLASM OF BREAST: ICD-10-CM

## 2023-02-18 PROCEDURE — 77063 BREAST TOMOSYNTHESIS BI: CPT

## 2023-05-25 ENCOUNTER — OFFICE VISIT (OUTPATIENT)
Dept: FAMILY MEDICINE CLINIC | Age: 53
End: 2023-05-25
Payer: COMMERCIAL

## 2023-05-25 VITALS
TEMPERATURE: 97.9 F | DIASTOLIC BLOOD PRESSURE: 80 MMHG | OXYGEN SATURATION: 98 % | HEART RATE: 77 BPM | WEIGHT: 167.2 LBS | BODY MASS INDEX: 26.99 KG/M2 | SYSTOLIC BLOOD PRESSURE: 118 MMHG

## 2023-05-25 DIAGNOSIS — F33.40 RECURRENT MAJOR DEPRESSIVE DISORDER, IN REMISSION (HCC): ICD-10-CM

## 2023-05-25 PROBLEM — J18.9 PNEUMONIA DUE TO INFECTIOUS ORGANISM: Status: RESOLVED | Noted: 2022-01-02 | Resolved: 2023-05-25

## 2023-05-25 PROBLEM — B34.2 CORONAVIRUS INFECTION: Status: RESOLVED | Noted: 2022-01-03 | Resolved: 2023-05-25

## 2023-05-25 PROBLEM — J12.3 HUMAN METAPNEUMOVIRUS PNEUMONIA: Status: RESOLVED | Noted: 2022-01-03 | Resolved: 2023-05-25

## 2023-05-25 PROCEDURE — 99213 OFFICE O/P EST LOW 20 MIN: CPT | Performed by: FAMILY MEDICINE

## 2023-05-25 RX ORDER — FLUTICASONE PROPIONATE 50 MCG
2 SPRAY, SUSPENSION (ML) NASAL DAILY
Qty: 16 G | Refills: 3 | Status: SHIPPED | OUTPATIENT
Start: 2023-05-25

## 2023-05-25 RX ORDER — CETIRIZINE HYDROCHLORIDE 10 MG/1
10 TABLET ORAL DAILY
Qty: 30 TABLET | Refills: 0 | Status: SHIPPED | OUTPATIENT
Start: 2023-05-25 | End: 2023-06-24

## 2023-05-25 SDOH — ECONOMIC STABILITY: INCOME INSECURITY: HOW HARD IS IT FOR YOU TO PAY FOR THE VERY BASICS LIKE FOOD, HOUSING, MEDICAL CARE, AND HEATING?: NOT HARD AT ALL

## 2023-05-25 SDOH — ECONOMIC STABILITY: FOOD INSECURITY: WITHIN THE PAST 12 MONTHS, YOU WORRIED THAT YOUR FOOD WOULD RUN OUT BEFORE YOU GOT MONEY TO BUY MORE.: NEVER TRUE

## 2023-05-25 SDOH — ECONOMIC STABILITY: HOUSING INSECURITY
IN THE LAST 12 MONTHS, WAS THERE A TIME WHEN YOU DID NOT HAVE A STEADY PLACE TO SLEEP OR SLEPT IN A SHELTER (INCLUDING NOW)?: NO

## 2023-05-25 SDOH — ECONOMIC STABILITY: FOOD INSECURITY: WITHIN THE PAST 12 MONTHS, THE FOOD YOU BOUGHT JUST DIDN'T LAST AND YOU DIDN'T HAVE MONEY TO GET MORE.: NEVER TRUE

## 2023-05-25 ASSESSMENT — PATIENT HEALTH QUESTIONNAIRE - PHQ9
7. TROUBLE CONCENTRATING ON THINGS, SUCH AS READING THE NEWSPAPER OR WATCHING TELEVISION: 0
SUM OF ALL RESPONSES TO PHQ QUESTIONS 1-9: 0
8. MOVING OR SPEAKING SO SLOWLY THAT OTHER PEOPLE COULD HAVE NOTICED. OR THE OPPOSITE, BEING SO FIGETY OR RESTLESS THAT YOU HAVE BEEN MOVING AROUND A LOT MORE THAN USUAL: 0
6. FEELING BAD ABOUT YOURSELF - OR THAT YOU ARE A FAILURE OR HAVE LET YOURSELF OR YOUR FAMILY DOWN: 0
SUM OF ALL RESPONSES TO PHQ QUESTIONS 1-9: 0
SUM OF ALL RESPONSES TO PHQ QUESTIONS 1-9: 0
3. TROUBLE FALLING OR STAYING ASLEEP: 0
9. THOUGHTS THAT YOU WOULD BE BETTER OFF DEAD, OR OF HURTING YOURSELF: 0
5. POOR APPETITE OR OVEREATING: 0
SUM OF ALL RESPONSES TO PHQ QUESTIONS 1-9: 0
2. FEELING DOWN, DEPRESSED OR HOPELESS: 0
4. FEELING TIRED OR HAVING LITTLE ENERGY: 0
1. LITTLE INTEREST OR PLEASURE IN DOING THINGS: 0
SUM OF ALL RESPONSES TO PHQ9 QUESTIONS 1 & 2: 0
10. IF YOU CHECKED OFF ANY PROBLEMS, HOW DIFFICULT HAVE THESE PROBLEMS MADE IT FOR YOU TO DO YOUR WORK, TAKE CARE OF THINGS AT HOME, OR GET ALONG WITH OTHER PEOPLE: 0

## 2023-05-25 NOTE — PROGRESS NOTES
Subjective:       Jenniffer Gruber is a 50 y.o. female who presents for evaluation of  loose stools/abdominal discomfort/sweats and chills also is concerned about sinus pressures especially when waking up in the morning. Nasal congestion. Symptoms have been present for several days. Patient denies constipation, dark urine, dysuria, hematemesis, hematuria, melena, and nausea. Patient's oral intake has been normal. Patient's urine output has been adequate. Other contacts with similar symptoms include: Possibility her grandkids which she had over the weekend. Patient denies recent travel history. Patient has not had recent ingestion of possible contaminated food, toxic plants, or inappropriate medications/poisons. Patient's medications, allergies, past medical, surgical, social and family histories were reviewed and updated as appropriate. Review of Systems  Pertinent items are noted in HPI. Objective:       /80   Pulse 77   Temp 97.9 °F (36.6 °C)   Wt 167 lb 3.2 oz (75.8 kg)   SpO2 98%   BMI 26.99 kg/m²   General appearance: alert, appears stated age, cooperative, and mild distress  Lungs: clear to auscultation bilaterally  Heart: regular rate and rhythm, S1, S2 normal, no murmur, click, rub or gallop  Abdomen: soft, non-tender; bowel sounds normal; no masses,  no organomegaly and active bowel sounds       Assessment:      Acute Gastroenteritis      Plan:      1. Discussed oral rehydration, reintroduction of solid foods, signs of dehydration. 2. Return or go to emergency department if worsening symptoms, blood or bile, signs of dehydration, diarrhea lasting longer than 5 days or any new concerns. 3. Follow up in several months or sooner as needed. I also prescribed Zyrtec and Flonase for nasal congestion. Call or return to clinic prn if these symptoms worsen or fail to improve as anticipated.   I have reviewed the instructions with the patient, answering all questions to her

## 2023-05-30 DIAGNOSIS — Z91.013 SEAFOOD ALLERGY: Primary | ICD-10-CM

## 2023-07-27 DIAGNOSIS — Z98.84 HX OF BARIATRIC SURGERY: ICD-10-CM

## 2023-07-27 DIAGNOSIS — R11.0 NAUSEA: ICD-10-CM

## 2023-07-27 RX ORDER — ONDANSETRON 4 MG/1
4 TABLET, ORALLY DISINTEGRATING ORAL EVERY 8 HOURS PRN
Qty: 90 TABLET | Refills: 1 | Status: SHIPPED | OUTPATIENT
Start: 2023-07-27

## 2023-07-27 RX ORDER — DULOXETIN HYDROCHLORIDE 30 MG/1
30 CAPSULE, DELAYED RELEASE ORAL DAILY
Qty: 30 CAPSULE | Refills: 3 | Status: SHIPPED | OUTPATIENT
Start: 2023-07-27

## 2023-08-01 RX ORDER — VIBEGRON 75 MG/1
TABLET, FILM COATED ORAL
Qty: 28 TABLET | Refills: 0 | COMMUNITY
Start: 2023-08-01

## 2023-10-04 ENCOUNTER — APPOINTMENT (OUTPATIENT)
Dept: GENERAL RADIOLOGY | Age: 53
End: 2023-10-04
Payer: COMMERCIAL

## 2023-10-04 ENCOUNTER — HOSPITAL ENCOUNTER (EMERGENCY)
Age: 53
Discharge: HOME | End: 2023-10-04
Attending: STUDENT IN AN ORGANIZED HEALTH CARE EDUCATION/TRAINING PROGRAM
Payer: COMMERCIAL

## 2023-10-04 VITALS
WEIGHT: 166 LBS | OXYGEN SATURATION: 96 % | HEART RATE: 77 BPM | SYSTOLIC BLOOD PRESSURE: 107 MMHG | BODY MASS INDEX: 26.79 KG/M2 | DIASTOLIC BLOOD PRESSURE: 77 MMHG | TEMPERATURE: 98.6 F | RESPIRATION RATE: 14 BRPM

## 2023-10-04 DIAGNOSIS — J02.9 ACUTE PHARYNGITIS, UNSPECIFIED ETIOLOGY: Primary | ICD-10-CM

## 2023-10-04 DIAGNOSIS — R07.89 ATYPICAL CHEST PAIN: ICD-10-CM

## 2023-10-04 LAB
ALBUMIN SERPL-MCNC: 4.3 G/DL (ref 3.5–5.2)
ALP SERPL-CCNC: 86 U/L (ref 35–104)
ALT SERPL-CCNC: 10 U/L (ref 5–33)
ANION GAP SERPL CALCULATED.3IONS-SCNC: 9 MMOL/L (ref 9–17)
AST SERPL-CCNC: 20 U/L
BASOPHILS # BLD: 0.04 K/UL (ref 0–0.2)
BASOPHILS NFR BLD: 0 % (ref 0–2)
BILIRUB SERPL-MCNC: 0.4 MG/DL (ref 0.3–1.2)
BUN SERPL-MCNC: 11 MG/DL (ref 6–20)
BUN/CREAT SERPL: 18 (ref 9–20)
CALCIUM SERPL-MCNC: 8.8 MG/DL (ref 8.6–10.4)
CHLORIDE SERPL-SCNC: 108 MMOL/L (ref 98–107)
CO2 SERPL-SCNC: 24 MMOL/L (ref 20–31)
CREAT SERPL-MCNC: 0.6 MG/DL (ref 0.5–0.9)
EOSINOPHIL # BLD: 0.21 K/UL (ref 0–0.44)
EOSINOPHILS RELATIVE PERCENT: 2 % (ref 1–4)
ERYTHROCYTE [DISTWIDTH] IN BLOOD BY AUTOMATED COUNT: 12.4 % (ref 11.8–14.4)
FLUAV RNA RESP QL NAA+PROBE: NOT DETECTED
FLUBV RNA RESP QL NAA+PROBE: NOT DETECTED
GFR SERPL CREATININE-BSD FRML MDRD: >60 ML/MIN/1.73M2
GLUCOSE SERPL-MCNC: 101 MG/DL (ref 70–99)
HCT VFR BLD AUTO: 40.6 % (ref 36.3–47.1)
HGB BLD-MCNC: 13.4 G/DL (ref 11.9–15.1)
IMM GRANULOCYTES # BLD AUTO: 0.03 K/UL (ref 0–0.3)
IMM GRANULOCYTES NFR BLD: 0 %
LYMPHOCYTES NFR BLD: 2.74 K/UL (ref 1.1–3.7)
LYMPHOCYTES RELATIVE PERCENT: 28 % (ref 24–43)
MCH RBC QN AUTO: 28.3 PG (ref 25.2–33.5)
MCHC RBC AUTO-ENTMCNC: 33 G/DL (ref 28.4–34.8)
MCV RBC AUTO: 85.7 FL (ref 82.6–102.9)
MONOCYTES NFR BLD: 0.74 K/UL (ref 0.1–1.2)
MONOCYTES NFR BLD: 8 % (ref 3–12)
NEUTROPHILS NFR BLD: 62 % (ref 36–65)
NEUTS SEG NFR BLD: 6.11 K/UL (ref 1.5–8.1)
NRBC BLD-RTO: 0 PER 100 WBC
PLATELET # BLD AUTO: 260 K/UL (ref 138–453)
PMV BLD AUTO: 9.9 FL (ref 8.1–13.5)
POTASSIUM SERPL-SCNC: 4.1 MMOL/L (ref 3.7–5.3)
PROT SERPL-MCNC: 7.1 G/DL (ref 6.4–8.3)
RBC # BLD AUTO: 4.74 M/UL (ref 3.95–5.11)
SARS-COV-2 RNA RESP QL NAA+PROBE: NOT DETECTED
SODIUM SERPL-SCNC: 141 MMOL/L (ref 135–144)
SOURCE: NORMAL
SPECIMEN DESCRIPTION: NORMAL
SPECIMEN SOURCE: NORMAL
STREP A, MOLECULAR: NEGATIVE
TROPONIN I SERPL HS-MCNC: <6 NG/L (ref 0–14)
WBC OTHER # BLD: 9.9 K/UL (ref 3.5–11.3)

## 2023-10-04 PROCEDURE — 96374 THER/PROPH/DIAG INJ IV PUSH: CPT

## 2023-10-04 PROCEDURE — 93005 ELECTROCARDIOGRAM TRACING: CPT | Performed by: STUDENT IN AN ORGANIZED HEALTH CARE EDUCATION/TRAINING PROGRAM

## 2023-10-04 PROCEDURE — 80053 COMPREHEN METABOLIC PANEL: CPT

## 2023-10-04 PROCEDURE — 6370000000 HC RX 637 (ALT 250 FOR IP): Performed by: STUDENT IN AN ORGANIZED HEALTH CARE EDUCATION/TRAINING PROGRAM

## 2023-10-04 PROCEDURE — 85025 COMPLETE CBC W/AUTO DIFF WBC: CPT

## 2023-10-04 PROCEDURE — 6360000002 HC RX W HCPCS: Performed by: STUDENT IN AN ORGANIZED HEALTH CARE EDUCATION/TRAINING PROGRAM

## 2023-10-04 PROCEDURE — 99285 EMERGENCY DEPT VISIT HI MDM: CPT

## 2023-10-04 PROCEDURE — 71045 X-RAY EXAM CHEST 1 VIEW: CPT

## 2023-10-04 PROCEDURE — 87651 STREP A DNA AMP PROBE: CPT

## 2023-10-04 PROCEDURE — 87636 SARSCOV2 & INF A&B AMP PRB: CPT

## 2023-10-04 PROCEDURE — 84484 ASSAY OF TROPONIN QUANT: CPT

## 2023-10-04 RX ORDER — GUAIFENESIN AND CODEINE PHOSPHATE 100; 10 MG/5ML; MG/5ML
5 SOLUTION ORAL 4 TIMES DAILY PRN
Qty: 118 ML | Refills: 0 | Status: SHIPPED | OUTPATIENT
Start: 2023-10-04 | End: 2023-10-12

## 2023-10-04 RX ORDER — CODEINE PHOSPHATE AND GUAIFENESIN 10; 100 MG/5ML; MG/5ML
5 SOLUTION ORAL ONCE
Status: COMPLETED | OUTPATIENT
Start: 2023-10-04 | End: 2023-10-04

## 2023-10-04 RX ORDER — DEXAMETHASONE SODIUM PHOSPHATE 10 MG/ML
10 INJECTION, SOLUTION INTRAMUSCULAR; INTRAVENOUS ONCE
Status: COMPLETED | OUTPATIENT
Start: 2023-10-04 | End: 2023-10-04

## 2023-10-04 RX ADMIN — GUAIFENESIN AND CODEINE PHOSPHATE 5 ML: 100; 10 SOLUTION ORAL at 02:44

## 2023-10-04 RX ADMIN — DEXAMETHASONE SODIUM PHOSPHATE 10 MG: 10 INJECTION, SOLUTION INTRAMUSCULAR; INTRAVENOUS at 02:35

## 2023-10-04 ASSESSMENT — PAIN SCALES - GENERAL: PAINLEVEL_OUTOF10: 6

## 2023-10-04 ASSESSMENT — PAIN - FUNCTIONAL ASSESSMENT: PAIN_FUNCTIONAL_ASSESSMENT: 0-10

## 2023-10-04 NOTE — ED NOTES
Pt presents to ED with c/o chest pain, SOB, and sore throat. Pt stated her granddaughter was recently diagnosed with strep. Pt started to feel ill today. Pt reports SOB with exertion. Pt stated CP is midsternal and feels like tightness. Pt stated pain is 6/10 all over body. Pt stated she is achy.      Randell Lewis RN  10/04/23 [id number]

## 2023-10-05 LAB
EKG ATRIAL RATE: 78 BPM
EKG P AXIS: 65 DEGREES
EKG P-R INTERVAL: 130 MS
EKG Q-T INTERVAL: 378 MS
EKG QRS DURATION: 76 MS
EKG QTC CALCULATION (BAZETT): 430 MS
EKG R AXIS: 53 DEGREES
EKG T AXIS: 40 DEGREES
EKG VENTRICULAR RATE: 78 BPM

## 2023-10-05 PROCEDURE — 93010 ELECTROCARDIOGRAM REPORT: CPT | Performed by: INTERNAL MEDICINE

## 2023-10-09 ENCOUNTER — OFFICE VISIT (OUTPATIENT)
Dept: FAMILY MEDICINE CLINIC | Age: 53
End: 2023-10-09
Payer: COMMERCIAL

## 2023-10-09 VITALS
SYSTOLIC BLOOD PRESSURE: 117 MMHG | DIASTOLIC BLOOD PRESSURE: 81 MMHG | OXYGEN SATURATION: 99 % | TEMPERATURE: 97.3 F | HEART RATE: 78 BPM | BODY MASS INDEX: 27.02 KG/M2 | WEIGHT: 167.4 LBS

## 2023-10-09 DIAGNOSIS — J01.10 SUBACUTE FRONTAL SINUSITIS: Primary | ICD-10-CM

## 2023-10-09 PROCEDURE — 99213 OFFICE O/P EST LOW 20 MIN: CPT | Performed by: FAMILY MEDICINE

## 2023-10-09 RX ORDER — BUTALBITAL, ACETAMINOPHEN AND CAFFEINE 50; 325; 40 MG/1; MG/1; MG/1
1 TABLET ORAL EVERY 4 HOURS PRN
Qty: 30 TABLET | Refills: 1 | Status: SHIPPED | OUTPATIENT
Start: 2023-10-09

## 2023-10-09 RX ORDER — AZITHROMYCIN 250 MG/1
250 TABLET, FILM COATED ORAL SEE ADMIN INSTRUCTIONS
Qty: 6 TABLET | Refills: 0 | Status: SHIPPED | OUTPATIENT
Start: 2023-10-09 | End: 2023-10-14

## 2023-10-09 ASSESSMENT — PATIENT HEALTH QUESTIONNAIRE - PHQ9
3. TROUBLE FALLING OR STAYING ASLEEP: 0
9. THOUGHTS THAT YOU WOULD BE BETTER OFF DEAD, OR OF HURTING YOURSELF: 0
5. POOR APPETITE OR OVEREATING: 0
2. FEELING DOWN, DEPRESSED OR HOPELESS: 0
SUM OF ALL RESPONSES TO PHQ QUESTIONS 1-9: 0
8. MOVING OR SPEAKING SO SLOWLY THAT OTHER PEOPLE COULD HAVE NOTICED. OR THE OPPOSITE, BEING SO FIGETY OR RESTLESS THAT YOU HAVE BEEN MOVING AROUND A LOT MORE THAN USUAL: 0
SUM OF ALL RESPONSES TO PHQ9 QUESTIONS 1 & 2: 0
10. IF YOU CHECKED OFF ANY PROBLEMS, HOW DIFFICULT HAVE THESE PROBLEMS MADE IT FOR YOU TO DO YOUR WORK, TAKE CARE OF THINGS AT HOME, OR GET ALONG WITH OTHER PEOPLE: 0
SUM OF ALL RESPONSES TO PHQ QUESTIONS 1-9: 0
SUM OF ALL RESPONSES TO PHQ QUESTIONS 1-9: 0
1. LITTLE INTEREST OR PLEASURE IN DOING THINGS: 0
7. TROUBLE CONCENTRATING ON THINGS, SUCH AS READING THE NEWSPAPER OR WATCHING TELEVISION: 0
SUM OF ALL RESPONSES TO PHQ QUESTIONS 1-9: 0
6. FEELING BAD ABOUT YOURSELF - OR THAT YOU ARE A FAILURE OR HAVE LET YOURSELF OR YOUR FAMILY DOWN: 0
4. FEELING TIRED OR HAVING LITTLE ENERGY: 0

## 2023-10-09 NOTE — PROGRESS NOTES
symptoms worsen or fail to improve. No orders of the defined types were placed in this encounter. Orders Placed This Encounter   Medications    azithromycin (ZITHROMAX) 250 MG tablet     Sig: Take 1 tablet by mouth See Admin Instructions for 5 days 500mg on day 1 followed by 250mg on days 2 - 5     Dispense:  6 tablet     Refill:  0    butalbital-acetaminophen-caffeine (FIORICET, ESGIC) -40 MG per tablet     Sig: Take 1 tablet by mouth every 4 hours as needed for Headaches     Dispense:  30 tablet     Refill:  1       Call or return to clinic prn if these symptoms worsen or fail to improve as anticipated. I have reviewed the instructions with the patient, answering all questions to patient's satisfaction. Kavon Miu received counseling on the following healthy behaviors: nutrition, exercise, and medication adherence  Reviewed prior labs and health maintenance. Continue current medications, diet and exercise. Discussed use, benefit, and side effects of prescribed medications. Barriers to medication compliance addressed. Patient given educational materials - see patient instructions. All patient questions answered. Patient voiced understanding.       Electronically signed by Joy Ann MD on 10/10/2023 at 6:44 AM       (Please note that portions of this note were completed with a voice recognition program. Efforts were made to edit the dictations but occasionally words are mis-transcribed.)

## 2023-10-31 DIAGNOSIS — J30.89 ENVIRONMENTAL AND SEASONAL ALLERGIES: Primary | ICD-10-CM

## 2023-10-31 DIAGNOSIS — R11.0 NAUSEA: ICD-10-CM

## 2023-10-31 DIAGNOSIS — K59.09 CHRONIC CONSTIPATION: ICD-10-CM

## 2023-10-31 RX ORDER — PANTOPRAZOLE SODIUM 40 MG/1
40 TABLET, DELAYED RELEASE ORAL
Qty: 90 TABLET | Refills: 5 | Status: SHIPPED | OUTPATIENT
Start: 2023-10-31

## 2023-10-31 RX ORDER — MONTELUKAST SODIUM 10 MG/1
10 TABLET ORAL DAILY
Qty: 30 TABLET | Refills: 3 | Status: SHIPPED | OUTPATIENT
Start: 2023-10-31

## 2023-12-04 RX ORDER — VIBEGRON 75 MG/1
75 TABLET, FILM COATED ORAL DAILY
Qty: 30 TABLET | Refills: 3 | Status: SHIPPED | OUTPATIENT
Start: 2023-12-04

## 2023-12-12 ENCOUNTER — HOSPITAL ENCOUNTER (OUTPATIENT)
Age: 53
Setting detail: SPECIMEN
Discharge: HOME | End: 2023-12-12

## 2023-12-12 DIAGNOSIS — R42 LIGHT-HEADEDNESS: Primary | ICD-10-CM

## 2023-12-12 DIAGNOSIS — R53.83 OTHER FATIGUE: Primary | ICD-10-CM

## 2023-12-12 DIAGNOSIS — R53.83 OTHER FATIGUE: ICD-10-CM

## 2023-12-12 LAB
INFLUENZA A ANTIBODY: NORMAL
INFLUENZA B ANTIBODY: NORMAL

## 2023-12-12 PROCEDURE — 87804 INFLUENZA ASSAY W/OPTIC: CPT | Performed by: FAMILY MEDICINE

## 2023-12-15 LAB — SARS-COV-2 AB SERPL QL IA: POSITIVE

## 2024-04-09 ENCOUNTER — TELEPHONE (OUTPATIENT)
Dept: FAMILY MEDICINE CLINIC | Age: 54
End: 2024-04-09

## 2024-04-09 DIAGNOSIS — Z78.0 ASYMPTOMATIC MENOPAUSE: ICD-10-CM

## 2024-04-09 DIAGNOSIS — Z12.31 ENCOUNTER FOR SCREENING MAMMOGRAM FOR MALIGNANT NEOPLASM OF BREAST: Primary | ICD-10-CM

## 2024-04-15 RX ORDER — ONDANSETRON 4 MG/1
4 TABLET, ORALLY DISINTEGRATING ORAL 3 TIMES DAILY PRN
Qty: 21 TABLET | Refills: 1 | Status: SHIPPED | OUTPATIENT
Start: 2024-04-15

## 2024-04-15 RX ORDER — DULOXETIN HYDROCHLORIDE 20 MG/1
20 CAPSULE, DELAYED RELEASE ORAL DAILY
Qty: 30 CAPSULE | Refills: 3 | Status: SHIPPED | OUTPATIENT
Start: 2024-04-15

## 2024-05-09 ENCOUNTER — OFFICE VISIT (OUTPATIENT)
Dept: FAMILY MEDICINE CLINIC | Age: 54
End: 2024-05-09
Payer: COMMERCIAL

## 2024-05-09 VITALS
TEMPERATURE: 97.3 F | HEART RATE: 67 BPM | HEIGHT: 66 IN | OXYGEN SATURATION: 99 % | BODY MASS INDEX: 27.03 KG/M2 | WEIGHT: 168.2 LBS | SYSTOLIC BLOOD PRESSURE: 127 MMHG | DIASTOLIC BLOOD PRESSURE: 84 MMHG

## 2024-05-09 DIAGNOSIS — Z71.89 ACP (ADVANCE CARE PLANNING): ICD-10-CM

## 2024-05-09 DIAGNOSIS — R00.2 PALPITATION: ICD-10-CM

## 2024-05-09 DIAGNOSIS — Z00.01 ENCOUNTER FOR WELL ADULT EXAM WITH ABNORMAL FINDINGS: Primary | ICD-10-CM

## 2024-05-09 DIAGNOSIS — R25.2 CRAMP IN LIMB: ICD-10-CM

## 2024-05-09 DIAGNOSIS — Z13.1 DIABETES MELLITUS SCREENING: ICD-10-CM

## 2024-05-09 DIAGNOSIS — Z13.6 ENCOUNTER FOR LIPID SCREENING FOR CARDIOVASCULAR DISEASE: ICD-10-CM

## 2024-05-09 DIAGNOSIS — F33.40 RECURRENT MAJOR DEPRESSIVE DISORDER, IN REMISSION (HCC): ICD-10-CM

## 2024-05-09 DIAGNOSIS — M79.674 PAIN OF TOE OF RIGHT FOOT: ICD-10-CM

## 2024-05-09 DIAGNOSIS — Z13.220 ENCOUNTER FOR LIPID SCREENING FOR CARDIOVASCULAR DISEASE: ICD-10-CM

## 2024-05-09 PROCEDURE — 99213 OFFICE O/P EST LOW 20 MIN: CPT | Performed by: FAMILY MEDICINE

## 2024-05-09 PROCEDURE — 99396 PREV VISIT EST AGE 40-64: CPT | Performed by: FAMILY MEDICINE

## 2024-05-09 SDOH — ECONOMIC STABILITY: FOOD INSECURITY: WITHIN THE PAST 12 MONTHS, YOU WORRIED THAT YOUR FOOD WOULD RUN OUT BEFORE YOU GOT MONEY TO BUY MORE.: NEVER TRUE

## 2024-05-09 SDOH — ECONOMIC STABILITY: FOOD INSECURITY: WITHIN THE PAST 12 MONTHS, THE FOOD YOU BOUGHT JUST DIDN'T LAST AND YOU DIDN'T HAVE MONEY TO GET MORE.: NEVER TRUE

## 2024-05-09 SDOH — ECONOMIC STABILITY: INCOME INSECURITY: HOW HARD IS IT FOR YOU TO PAY FOR THE VERY BASICS LIKE FOOD, HOUSING, MEDICAL CARE, AND HEATING?: NOT HARD AT ALL

## 2024-05-09 ASSESSMENT — PATIENT HEALTH QUESTIONNAIRE - PHQ9
7. TROUBLE CONCENTRATING ON THINGS, SUCH AS READING THE NEWSPAPER OR WATCHING TELEVISION: NOT AT ALL
6. FEELING BAD ABOUT YOURSELF - OR THAT YOU ARE A FAILURE OR HAVE LET YOURSELF OR YOUR FAMILY DOWN: NOT AT ALL
SUM OF ALL RESPONSES TO PHQ QUESTIONS 1-9: 0
8. MOVING OR SPEAKING SO SLOWLY THAT OTHER PEOPLE COULD HAVE NOTICED. OR THE OPPOSITE, BEING SO FIGETY OR RESTLESS THAT YOU HAVE BEEN MOVING AROUND A LOT MORE THAN USUAL: NOT AT ALL
3. TROUBLE FALLING OR STAYING ASLEEP: NOT AT ALL
1. LITTLE INTEREST OR PLEASURE IN DOING THINGS: NOT AT ALL
4. FEELING TIRED OR HAVING LITTLE ENERGY: NOT AT ALL
5. POOR APPETITE OR OVEREATING: NOT AT ALL
2. FEELING DOWN, DEPRESSED OR HOPELESS: NOT AT ALL
SUM OF ALL RESPONSES TO PHQ9 QUESTIONS 1 & 2: 0
10. IF YOU CHECKED OFF ANY PROBLEMS, HOW DIFFICULT HAVE THESE PROBLEMS MADE IT FOR YOU TO DO YOUR WORK, TAKE CARE OF THINGS AT HOME, OR GET ALONG WITH OTHER PEOPLE: NOT DIFFICULT AT ALL
SUM OF ALL RESPONSES TO PHQ QUESTIONS 1-9: 0
SUM OF ALL RESPONSES TO PHQ QUESTIONS 1-9: 0
9. THOUGHTS THAT YOU WOULD BE BETTER OFF DEAD, OR OF HURTING YOURSELF: NOT AT ALL
SUM OF ALL RESPONSES TO PHQ QUESTIONS 1-9: 0

## 2024-05-09 NOTE — PATIENT INSTRUCTIONS
Thank you for choosing "[facility]".  We know you have options when it comes to your healthcare; we appreciate that you chose us. Our goal is to provide exceptional  service and world class care to every patient.  You will be receiving a survey via email or text message asking for your feedback.  Please take a few minutes to share your thoughts about your recent visit. Your comments helps us understand what we do well and ways we can improve.  Thank you in advance for your valuable feedback.      Advance Care Planning     Advance Care Planning opens a door to talk about and write down your wishes before a sudden accident or illness.  Make your goals, values, and preferences known.     This puts you in the ’s seat and helps others know what matters most to you so they won’t have to guess.      Where can you learn more?    Go to [email or web address]   to learn how to:    Name someone you trust to make healthcare decisions for you, only if you can’t. (Healthcare Power of )    Document your wishes for care if you were seriously ill and not expected to recover or are approaching end of life. (Advance Directive or Living Will)    The same page can be found using the QR code below.                Well Visit, Ages 18 to 65: Care Instructions  Well visits can help you stay healthy. Your doctor has checked your overall health and may have suggested ways to take good care of yourself. Your doctor also may have recommended tests. You can help prevent illness with healthy eating, good sleep, vaccinations, regular exercise, and other steps.    Get the tests that you and your doctor decide on. Depending on your age and risks, examples might include screening for diabetes; hepatitis C; HIV; and cervical, breast, lung, and colon cancer. Screening helps find diseases before any symptoms appear.   Eat healthy foods. Choose fruits, vegetables, whole grains, lean protein, and

## 2024-05-09 NOTE — PROGRESS NOTES
Well Adult Note  Name: Britt Rosario Today’s Date: 2024   MRN: [id number] Sex: Female   Age: 53 y.o. Ethnicity:  /    : 1970 Race: White (non-)      Britt Rosario is here for well adult exam.    History:  Coming in today because of palpitations.  This started around 6 months ago.  The patient states the palpitation just come on randomly.  3 times or so week.  She cannot feel them coming on which takes her breath away.  They last around 10 to 15 seconds.  She denies any chest pain chest pressure chest discomfort.  No exertional chest pain.    The patient is quite concerned as her dad 79 yo had a triple bypass 2 years ago.  Comorbidities include HTN, high cholesterol.    Patient is also concerned about shinsplints.  She states that she has cramps even at the top of her feet on and off.    Also concerned about discomfort in her 2 areas she would like me to order a uric acid.    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  She will make an appointment.    Review of Systems  Pertinent items are noted in HPI.    Allergies   Allergen Reactions    Nsaids     Nalbuphine Nausea And Vomiting    Other Nausea And Vomiting    Oxycodone-Acetaminophen Nausea Only    Sulfamethoxazole-Trimethoprim Hives and Rash         Prior to Visit Medications    Medication Sig Taking? Authorizing Provider   Magnesium Citrate 200 MG TABS Take 1 each by mouth daily Yes Aliya Tarango MD   DULoxetine (CYMBALTA) 20 MG extended release capsule Take 1 capsule by mouth daily Yes Aliya Tarango MD   ondansetron (ZOFRAN-ODT) 4 MG disintegrating tablet Take 1 tablet by mouth 3 times daily as needed for Nausea or Vomiting Yes Aliya Tarango MD   calcium carbonate (GNP CALCIUM) 1500 (600 Ca) MG TABS tablet Take 2 tablets by mouth daily Yes ProviderRiddhi MD   vibegron (GEMTESA) 75 MG TABS tablet Take 1 tablet by mouth daily Yes Aliya Tarango MD

## 2024-05-10 ENCOUNTER — HOSPITAL ENCOUNTER (OUTPATIENT)
Age: 54
Setting detail: SPECIMEN
Discharge: HOME | End: 2024-05-10

## 2024-05-10 DIAGNOSIS — M79.674 PAIN OF TOE OF RIGHT FOOT: ICD-10-CM

## 2024-05-10 DIAGNOSIS — Z13.1 DIABETES MELLITUS SCREENING: ICD-10-CM

## 2024-05-10 DIAGNOSIS — R25.2 CRAMP IN LIMB: ICD-10-CM

## 2024-05-10 DIAGNOSIS — Z13.220 ENCOUNTER FOR LIPID SCREENING FOR CARDIOVASCULAR DISEASE: ICD-10-CM

## 2024-05-10 DIAGNOSIS — Z13.6 ENCOUNTER FOR LIPID SCREENING FOR CARDIOVASCULAR DISEASE: ICD-10-CM

## 2024-05-10 LAB
CHOLEST SERPL-MCNC: 163 MG/DL (ref 0–199)
CHOLESTEROL/HDL RATIO: 4
GLUCOSE SERPL-MCNC: 92 MG/DL (ref 74–99)
HDLC SERPL-MCNC: 40 MG/DL
LDLC SERPL CALC-MCNC: 99 MG/DL (ref 0–100)
MAGNESIUM SERPL-MCNC: 2.1 MG/DL (ref 1.6–2.6)
PATIENT FASTING?: ABNORMAL
TRIGL SERPL-MCNC: 120 MG/DL
URATE SERPL-MCNC: 4.5 MG/DL (ref 2.4–5.7)
VLDLC SERPL CALC-MCNC: 24 MG/DL

## 2024-05-22 ENCOUNTER — HOSPITAL ENCOUNTER (OUTPATIENT)
Age: 54
Discharge: HOME | End: 2024-05-24
Payer: COMMERCIAL

## 2024-05-22 DIAGNOSIS — R00.2 PALPITATION: ICD-10-CM

## 2024-05-22 PROCEDURE — 93246 EXT ECG>7D<15D RECORDING: CPT

## 2024-05-28 DIAGNOSIS — S29.012A MUSCLE STRAIN OF RIGHT UPPER BACK, INITIAL ENCOUNTER: ICD-10-CM

## 2024-05-28 RX ORDER — BUTALBITAL, ACETAMINOPHEN AND CAFFEINE 50; 325; 40 MG/1; MG/1; MG/1
1 TABLET ORAL EVERY 4 HOURS PRN
Qty: 30 TABLET | Refills: 1 | Status: SHIPPED | OUTPATIENT
Start: 2024-05-28

## 2024-05-28 RX ORDER — BACLOFEN 10 MG/1
10 TABLET ORAL 3 TIMES DAILY
Qty: 30 TABLET | Refills: 3 | Status: SHIPPED | OUTPATIENT
Start: 2024-05-28

## 2024-06-08 ENCOUNTER — HOSPITAL ENCOUNTER (OUTPATIENT)
Dept: MAMMOGRAPHY | Age: 54
End: 2024-06-08
Payer: COMMERCIAL

## 2024-06-08 VITALS — HEIGHT: 65 IN | BODY MASS INDEX: 27.99 KG/M2 | WEIGHT: 168 LBS

## 2024-06-08 DIAGNOSIS — Z12.31 ENCOUNTER FOR SCREENING MAMMOGRAM FOR MALIGNANT NEOPLASM OF BREAST: ICD-10-CM

## 2024-06-08 DIAGNOSIS — Z78.0 ASYMPTOMATIC MENOPAUSE: ICD-10-CM

## 2024-06-08 PROCEDURE — 77063 BREAST TOMOSYNTHESIS BI: CPT

## 2024-06-08 PROCEDURE — 77080 DXA BONE DENSITY AXIAL: CPT

## 2024-06-19 RX ORDER — ACYCLOVIR 50 MG/G
OINTMENT TOPICAL
Qty: 30 G | Refills: 1 | Status: SHIPPED | OUTPATIENT
Start: 2024-06-19 | End: 2024-06-26

## 2024-06-19 NOTE — TELEPHONE ENCOUNTER
Britt Rosario is calling to request a refill on the following medication(s):    Last Visit Date (If Applicable):  5/9/2024    Next Visit Date:    Visit date not found    Medication Request:  Requested Prescriptions     Pending Prescriptions Disp Refills    acyclovir (ZOVIRAX) 5 % ointment 30 g 1     Sig: Apply topically 5 times daily.

## 2024-07-05 ENCOUNTER — NURSE ONLY (OUTPATIENT)
Dept: FAMILY MEDICINE CLINIC | Age: 54
End: 2024-07-05

## 2024-07-05 VITALS
BODY MASS INDEX: 27.99 KG/M2 | WEIGHT: 168 LBS | RESPIRATION RATE: 16 BRPM | SYSTOLIC BLOOD PRESSURE: 134 MMHG | HEART RATE: 84 BPM | OXYGEN SATURATION: 99 % | DIASTOLIC BLOOD PRESSURE: 84 MMHG | HEIGHT: 65 IN

## 2024-07-05 DIAGNOSIS — T14.8XXA MUSCLE STRAIN: Primary | ICD-10-CM

## 2024-07-05 DIAGNOSIS — M54.40 ACUTE MIDLINE LOW BACK PAIN WITH SCIATICA, SCIATICA LATERALITY UNSPECIFIED: Primary | ICD-10-CM

## 2024-07-05 RX ORDER — TRIAMCINOLONE ACETONIDE 40 MG/ML
40 INJECTION, SUSPENSION INTRA-ARTICULAR; INTRAMUSCULAR ONCE
Status: COMPLETED | OUTPATIENT
Start: 2024-07-05 | End: 2024-07-05

## 2024-07-05 RX ORDER — TIZANIDINE 4 MG/1
4 TABLET ORAL 3 TIMES DAILY PRN
Qty: 30 TABLET | Refills: 0 | Status: SHIPPED | OUTPATIENT
Start: 2024-07-05

## 2024-07-05 RX ORDER — TRIAMCINOLONE ACETONIDE 40 MG/ML
40 INJECTION, SUSPENSION INTRA-ARTICULAR; INTRAMUSCULAR ONCE
Status: DISCONTINUED | OUTPATIENT
Start: 2024-07-05 | End: 2024-07-05

## 2024-07-05 RX ADMIN — TRIAMCINOLONE ACETONIDE 40 MG: 40 INJECTION, SUSPENSION INTRA-ARTICULAR; INTRAMUSCULAR at 15:33

## 2024-07-05 NOTE — PATIENT INSTRUCTIONS
Thank you for choosing [facility].  We know you have options when it comes to your healthcare; we appreciate that you chose us. Our goal is to provide exceptional  service and world class care to every patient.  You will be receiving a survey via email or text message asking for your feedback.  Please take a few minutes to share your thoughts about your recent visit. Your comments helps us understand what we do well and ways we can improve.  Thank you in advance for your valuable feedback.                New Updates for [employer]    Thank you for choosing [facility] to give you the best care! [facility] is always trying to think of new ways to help their patients. We are asking all patients to try out the new digital registration that is now available through the [employer]. Down load today!. Via the reese you're now able to update your personal and registration information prior to your upcoming appointment. This will save you time once you arrive at the office to check-in, not to mention your information remains safe!! Many other perks come from signing up for an account, such as:  Requesting refills  Scheduling an appointment  Completing an E-Visit  Sending a message to the office/provider  Having access to your medication list  Paying your bill/copay prior to your appointment  Scheduling your yearly mammogram  Review your test results    If you are not familiar the [employer], please ask one of us and we will be happy to answer any questions or help you set-up your account.

## 2024-07-05 NOTE — PROGRESS NOTES
Britt came into the office to get a kenalog 40 mg. Patient is having low back pain.  Given in the Right Ventrogluteal and tolerated well.

## 2024-07-18 ENCOUNTER — APPOINTMENT (OUTPATIENT)
Dept: CT IMAGING | Age: 54
End: 2024-07-18
Payer: COMMERCIAL

## 2024-07-18 ENCOUNTER — HOSPITAL ENCOUNTER (OUTPATIENT)
Age: 54
Setting detail: SPECIMEN
Discharge: HOME | End: 2024-07-18
Payer: COMMERCIAL

## 2024-07-18 ENCOUNTER — HOSPITAL ENCOUNTER (EMERGENCY)
Age: 54
Discharge: HOME | End: 2024-07-18
Attending: EMERGENCY MEDICINE
Payer: COMMERCIAL

## 2024-07-18 VITALS
BODY MASS INDEX: 27 KG/M2 | RESPIRATION RATE: 16 BRPM | HEART RATE: 100 BPM | OXYGEN SATURATION: 97 % | WEIGHT: 168 LBS | SYSTOLIC BLOOD PRESSURE: 133 MMHG | HEIGHT: 66 IN | DIASTOLIC BLOOD PRESSURE: 89 MMHG | TEMPERATURE: 97.8 F

## 2024-07-18 DIAGNOSIS — R53.83 OTHER FATIGUE: ICD-10-CM

## 2024-07-18 DIAGNOSIS — R25.2 CRAMP IN LIMB: ICD-10-CM

## 2024-07-18 DIAGNOSIS — Z98.84 HX OF BARIATRIC SURGERY: ICD-10-CM

## 2024-07-18 DIAGNOSIS — R10.9 FLANK PAIN: ICD-10-CM

## 2024-07-18 DIAGNOSIS — R42 VERTIGO: Primary | ICD-10-CM

## 2024-07-18 DIAGNOSIS — R73.9 HYPERGLYCEMIA: ICD-10-CM

## 2024-07-18 DIAGNOSIS — R10.9 FLANK PAIN: Primary | ICD-10-CM

## 2024-07-18 DIAGNOSIS — R11.0 NAUSEA: ICD-10-CM

## 2024-07-18 LAB
25(OH)D3 SERPL-MCNC: 14.3 NG/ML (ref 30–100)
ALBUMIN SERPL-MCNC: 4.4 G/DL (ref 3.5–5.2)
ALBUMIN/GLOB SERPL: 2 {RATIO} (ref 1–2.5)
ALP SERPL-CCNC: 76 U/L (ref 35–104)
ALT SERPL-CCNC: 10 U/L (ref 10–35)
ANION GAP SERPL CALCULATED.3IONS-SCNC: 11 MMOL/L (ref 9–16)
ANION GAP SERPL CALCULATED.3IONS-SCNC: 11 MMOL/L (ref 9–17)
AST SERPL-CCNC: 19 U/L (ref 10–35)
BASOPHILS # BLD: 0.07 K/UL (ref 0–0.2)
BASOPHILS NFR BLD: 1 % (ref 0–2)
BILIRUB SERPL-MCNC: 0.4 MG/DL (ref 0–1.2)
BILIRUB UR QL STRIP: NEGATIVE
BUN SERPL-MCNC: 10 MG/DL (ref 6–20)
BUN SERPL-MCNC: 13 MG/DL (ref 6–20)
BUN/CREAT SERPL: 19 (ref 9–20)
CALCIUM SERPL-MCNC: 8.7 MG/DL (ref 8.6–10.4)
CALCIUM SERPL-MCNC: 9 MG/DL (ref 8.6–10.4)
CHLORIDE SERPL-SCNC: 108 MMOL/L (ref 98–107)
CHLORIDE SERPL-SCNC: 109 MMOL/L (ref 98–107)
CLARITY UR: CLEAR
CO2 SERPL-SCNC: 23 MMOL/L (ref 20–31)
CO2 SERPL-SCNC: 25 MMOL/L (ref 20–31)
COLOR UR: YELLOW
COMMENT: NORMAL
CREAT SERPL-MCNC: 0.7 MG/DL (ref 0.5–0.9)
CREAT SERPL-MCNC: 0.9 MG/DL (ref 0.5–0.9)
EKG ATRIAL RATE: 87 BPM
EKG P AXIS: 52 DEGREES
EKG P-R INTERVAL: 126 MS
EKG Q-T INTERVAL: 360 MS
EKG QRS DURATION: 78 MS
EKG QTC CALCULATION (BAZETT): 433 MS
EKG R AXIS: 47 DEGREES
EKG T AXIS: 48 DEGREES
EKG VENTRICULAR RATE: 87 BPM
EOSINOPHIL # BLD: 0.2 K/UL (ref 0–0.44)
EOSINOPHILS RELATIVE PERCENT: 2 % (ref 1–4)
ERYTHROCYTE [DISTWIDTH] IN BLOOD BY AUTOMATED COUNT: 13 % (ref 11.8–14.4)
FERRITIN SERPL-MCNC: 15 NG/ML (ref 13–150)
GFR, ESTIMATED: 75 ML/MIN/1.73M2
GFR, ESTIMATED: >90 ML/MIN/1.73M2
GLUCOSE BLD-MCNC: 180 MG/DL (ref 65–105)
GLUCOSE SERPL-MCNC: 84 MG/DL (ref 74–99)
GLUCOSE SERPL-MCNC: 96 MG/DL (ref 70–99)
GLUCOSE UR STRIP-MCNC: NEGATIVE MG/DL
HCT VFR BLD AUTO: 40.7 % (ref 36.3–47.1)
HGB BLD-MCNC: 13.3 G/DL (ref 11.9–15.1)
HGB UR QL STRIP.AUTO: NEGATIVE
IMM GRANULOCYTES # BLD AUTO: 0.02 K/UL (ref 0–0.3)
IMM GRANULOCYTES NFR BLD: 0 %
KETONES UR STRIP-MCNC: NEGATIVE MG/DL
LEUKOCYTE ESTERASE UR QL STRIP: NEGATIVE
LYMPHOCYTES NFR BLD: 2.54 K/UL (ref 1.1–3.7)
LYMPHOCYTES RELATIVE PERCENT: 31 % (ref 24–43)
MCH RBC QN AUTO: 27.8 PG (ref 25.2–33.5)
MCHC RBC AUTO-ENTMCNC: 32.7 G/DL (ref 28.4–34.8)
MCV RBC AUTO: 85 FL (ref 82.6–102.9)
MONOCYTES NFR BLD: 0.65 K/UL (ref 0.1–1.2)
MONOCYTES NFR BLD: 8 % (ref 3–12)
NEUTROPHILS NFR BLD: 58 % (ref 36–65)
NEUTS SEG NFR BLD: 4.77 K/UL (ref 1.5–8.1)
NITRITE UR QL STRIP: NEGATIVE
NRBC BLD-RTO: 0 PER 100 WBC
PH UR STRIP: 7.5 [PH] (ref 5–8)
PLATELET # BLD AUTO: 239 K/UL (ref 138–453)
PMV BLD AUTO: 9.6 FL (ref 8.1–13.5)
POTASSIUM SERPL-SCNC: 3.5 MMOL/L (ref 3.7–5.3)
POTASSIUM SERPL-SCNC: 3.9 MMOL/L (ref 3.7–5.3)
PROT SERPL-MCNC: 7 G/DL (ref 6.6–8.7)
PROT UR STRIP-MCNC: NEGATIVE MG/DL
RBC # BLD AUTO: 4.79 M/UL (ref 3.95–5.11)
SODIUM SERPL-SCNC: 142 MMOL/L (ref 135–144)
SODIUM SERPL-SCNC: 145 MMOL/L (ref 136–145)
SP GR UR STRIP: 1.01 (ref 1–1.03)
TSH SERPL DL<=0.05 MIU/L-ACNC: 1.67 UIU/ML (ref 0.27–4.2)
UROBILINOGEN UR STRIP-ACNC: NORMAL EU/DL (ref 0–1)
WBC OTHER # BLD: 8.3 K/UL (ref 3.5–11.3)

## 2024-07-18 PROCEDURE — 82947 ASSAY GLUCOSE BLOOD QUANT: CPT

## 2024-07-18 PROCEDURE — 93005 ELECTROCARDIOGRAM TRACING: CPT | Performed by: EMERGENCY MEDICINE

## 2024-07-18 PROCEDURE — 99284 EMERGENCY DEPT VISIT MOD MDM: CPT

## 2024-07-18 PROCEDURE — 82728 ASSAY OF FERRITIN: CPT

## 2024-07-18 PROCEDURE — 6370000000 HC RX 637 (ALT 250 FOR IP): Performed by: EMERGENCY MEDICINE

## 2024-07-18 PROCEDURE — 2580000003 HC RX 258: Performed by: EMERGENCY MEDICINE

## 2024-07-18 PROCEDURE — 85025 COMPLETE CBC W/AUTO DIFF WBC: CPT

## 2024-07-18 PROCEDURE — 6360000002 HC RX W HCPCS: Performed by: EMERGENCY MEDICINE

## 2024-07-18 PROCEDURE — 80048 BASIC METABOLIC PNL TOTAL CA: CPT

## 2024-07-18 PROCEDURE — 82306 VITAMIN D 25 HYDROXY: CPT

## 2024-07-18 PROCEDURE — 81003 URINALYSIS AUTO W/O SCOPE: CPT

## 2024-07-18 PROCEDURE — 80053 COMPREHEN METABOLIC PANEL: CPT

## 2024-07-18 PROCEDURE — 36415 COLL VENOUS BLD VENIPUNCTURE: CPT

## 2024-07-18 PROCEDURE — 93010 ELECTROCARDIOGRAM REPORT: CPT | Performed by: INTERNAL MEDICINE

## 2024-07-18 PROCEDURE — 84443 ASSAY THYROID STIM HORMONE: CPT

## 2024-07-18 PROCEDURE — 70450 CT HEAD/BRAIN W/O DYE: CPT

## 2024-07-18 PROCEDURE — 96374 THER/PROPH/DIAG INJ IV PUSH: CPT

## 2024-07-18 RX ORDER — ONDANSETRON 2 MG/ML
4 INJECTION INTRAMUSCULAR; INTRAVENOUS ONCE
Status: COMPLETED | OUTPATIENT
Start: 2024-07-18 | End: 2024-07-18

## 2024-07-18 RX ORDER — MECLIZINE HCL 12.5 MG/1
25 TABLET ORAL ONCE
Status: COMPLETED | OUTPATIENT
Start: 2024-07-18 | End: 2024-07-18

## 2024-07-18 RX ORDER — 0.9 % SODIUM CHLORIDE 0.9 %
1000 INTRAVENOUS SOLUTION INTRAVENOUS ONCE
Status: COMPLETED | OUTPATIENT
Start: 2024-07-18 | End: 2024-07-18

## 2024-07-18 RX ORDER — MECLIZINE HYDROCHLORIDE 25 MG/1
25 TABLET ORAL 3 TIMES DAILY PRN
Qty: 30 TABLET | Refills: 0 | Status: SHIPPED | OUTPATIENT
Start: 2024-07-18 | End: 2024-07-28

## 2024-07-18 RX ADMIN — MECLIZINE HCL 12.5 MG 25 MG: 12.5 TABLET ORAL at 02:06

## 2024-07-18 RX ADMIN — SODIUM CHLORIDE 1000 ML: 9 INJECTION, SOLUTION INTRAVENOUS at 02:07

## 2024-07-18 RX ADMIN — ONDANSETRON 4 MG: 2 INJECTION INTRAMUSCULAR; INTRAVENOUS at 02:07

## 2024-07-18 NOTE — ED PROVIDER NOTES
changes, CT scan of the brain without acute findings, labs otherwise unremarkable.  On reassessment patient reports feeling improved after fluids and medications as above.  Impression is likely peripheral vertigo, I have low concern for central process.  Patient was given return precautions, provided prescription for meclizine, she was discharged from the ED in stable condition with instructions to follow-up with her PCP.    DIAGNOSTIC RESULTS   EKG:All EKG's are interpreted by the Emergency Department Physician who either signs or Co-signs this chart in the absence of a cardiologist.    NSR, nonspecific changes, no acute ischemic changes on ST segments, no change compared to old, normal rate and normal intervals      RADIOLOGY:All plain film, CT, MRI, and formal ultrasound images (except ED bedside ultrasound) are read by the radiologist, see reports below, unless otherwisenoted in MDM or here.  CT HEAD WO CONTRAST   Final Result   No acute intracranial abnormality.  No evidence of intracranial hemorrhage.   There is no definable focal abnormality or acute process in brain.           LABS: All lab results were reviewed by myself, and all abnormals are listed below.  Labs Reviewed   BASIC METABOLIC PANEL - Abnormal; Notable for the following components:       Result Value    Potassium 3.5 (*)     Chloride 108 (*)     All other components within normal limits   POC GLUCOSE FINGERSTICK - Abnormal; Notable for the following components:    POC Glucose 180 (*)     All other components within normal limits   CBC WITH AUTO DIFFERENTIAL                Vitals:    Vitals:    07/18/24 0133 07/18/24 0134 07/18/24 0137   BP:  133/89    Pulse: 100     Resp: 16     Temp:   97.8 °F (36.6 °C)   TempSrc:   Oral   SpO2: 97%     Weight: 76.2 kg (168 lb)     Height: 1.676 m (5' 6\")         The patient was given the following medications while in the emergency department:  Orders Placed This Encounter   Medications    ondansetron

## 2024-07-18 NOTE — DISCHARGE INSTRUCTIONS
Use meclizine as needed for dizziness.  If you have severe worsening of your symptoms or any new concerning symptoms come back to the ER.  Follow-up with your primary care doctor.

## 2024-07-19 RX ORDER — BUTALBITAL, ACETAMINOPHEN AND CAFFEINE 50; 325; 40 MG/1; MG/1; MG/1
1 TABLET ORAL EVERY 4 HOURS PRN
Qty: 30 TABLET | Refills: 1 | Status: SHIPPED | OUTPATIENT
Start: 2024-07-19

## 2024-07-19 RX ORDER — ONDANSETRON 4 MG/1
4 TABLET, ORALLY DISINTEGRATING ORAL EVERY 8 HOURS PRN
Qty: 90 TABLET | Refills: 1 | Status: SHIPPED | OUTPATIENT
Start: 2024-07-19

## 2024-07-19 RX ORDER — DULOXETIN HYDROCHLORIDE 20 MG/1
20 CAPSULE, DELAYED RELEASE ORAL DAILY
Qty: 30 CAPSULE | Refills: 3 | Status: SHIPPED | OUTPATIENT
Start: 2024-07-19

## 2024-07-20 DIAGNOSIS — E55.9 VITAMIN D DEFICIENCY: Primary | ICD-10-CM

## 2024-07-20 RX ORDER — ERGOCALCIFEROL 1.25 MG/1
50000 CAPSULE ORAL WEEKLY
Qty: 4 CAPSULE | Refills: 3 | Status: SHIPPED | OUTPATIENT
Start: 2024-07-20

## 2024-07-24 ENCOUNTER — HOSPITAL ENCOUNTER (OUTPATIENT)
Dept: ULTRASOUND IMAGING | Age: 54
Discharge: HOME | End: 2024-07-26
Payer: COMMERCIAL

## 2024-07-24 DIAGNOSIS — R10.9 FLANK PAIN: ICD-10-CM

## 2024-07-24 PROCEDURE — 76775 US EXAM ABDO BACK WALL LIM: CPT

## 2024-07-30 ENCOUNTER — TELEPHONE (OUTPATIENT)
Dept: FAMILY MEDICINE CLINIC | Age: 54
End: 2024-07-30

## 2024-07-30 DIAGNOSIS — F43.9 STRESS: Primary | ICD-10-CM

## 2024-08-01 ENCOUNTER — OFFICE VISIT (OUTPATIENT)
Dept: BEHAVIORAL/MENTAL HEALTH CLINIC | Age: 54
End: 2024-08-01

## 2024-08-01 DIAGNOSIS — F43.22 ADJUSTMENT DISORDER WITH ANXIOUS MOOD: Primary | ICD-10-CM

## 2024-08-01 PROBLEM — F43.23 ADJUSTMENT DISORDER WITH MIXED ANXIETY AND DEPRESSED MOOD: Status: ACTIVE | Noted: 2024-08-01

## 2024-08-01 PROCEDURE — 90791 PSYCH DIAGNOSTIC EVALUATION: CPT | Performed by: COUNSELOR

## 2024-08-01 ASSESSMENT — PATIENT HEALTH QUESTIONNAIRE - PHQ9
SUM OF ALL RESPONSES TO PHQ QUESTIONS 1-9: 0
4. FEELING TIRED OR HAVING LITTLE ENERGY: NOT AT ALL
2. FEELING DOWN, DEPRESSED OR HOPELESS: NOT AT ALL
SUM OF ALL RESPONSES TO PHQ QUESTIONS 1-9: 0
6. FEELING BAD ABOUT YOURSELF - OR THAT YOU ARE A FAILURE OR HAVE LET YOURSELF OR YOUR FAMILY DOWN: NOT AT ALL
9. THOUGHTS THAT YOU WOULD BE BETTER OFF DEAD, OR OF HURTING YOURSELF: NOT AT ALL
SUM OF ALL RESPONSES TO PHQ QUESTIONS 1-9: 0
7. TROUBLE CONCENTRATING ON THINGS, SUCH AS READING THE NEWSPAPER OR WATCHING TELEVISION: NOT AT ALL
SUM OF ALL RESPONSES TO PHQ QUESTIONS 1-9: 0
5. POOR APPETITE OR OVEREATING: NOT AT ALL
8. MOVING OR SPEAKING SO SLOWLY THAT OTHER PEOPLE COULD HAVE NOTICED. OR THE OPPOSITE, BEING SO FIGETY OR RESTLESS THAT YOU HAVE BEEN MOVING AROUND A LOT MORE THAN USUAL: NOT AT ALL
3. TROUBLE FALLING OR STAYING ASLEEP: NOT AT ALL

## 2024-08-01 ASSESSMENT — ANXIETY QUESTIONNAIRES
5. BEING SO RESTLESS THAT IT IS HARD TO SIT STILL: NOT AT ALL
3. WORRYING TOO MUCH ABOUT DIFFERENT THINGS: SEVERAL DAYS
6. BECOMING EASILY ANNOYED OR IRRITABLE: NOT AT ALL
GAD7 TOTAL SCORE: 3
7. FEELING AFRAID AS IF SOMETHING AWFUL MIGHT HAPPEN: SEVERAL DAYS
2. NOT BEING ABLE TO STOP OR CONTROL WORRYING: NOT AT ALL
1. FEELING NERVOUS, ANXIOUS, OR ON EDGE: SEVERAL DAYS
IF YOU CHECKED OFF ANY PROBLEMS ON THIS QUESTIONNAIRE, HOW DIFFICULT HAVE THESE PROBLEMS MADE IT FOR YOU TO DO YOUR WORK, TAKE CARE OF THINGS AT HOME, OR GET ALONG WITH OTHER PEOPLE: NOT DIFFICULT AT ALL
4. TROUBLE RELAXING: NOT AT ALL
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
## 2024-08-02 ENCOUNTER — TELEPHONE (OUTPATIENT)
Dept: BEHAVIORAL/MENTAL HEALTH CLINIC | Age: 54
End: 2024-08-02

## 2024-08-02 NOTE — TELEPHONE ENCOUNTER
Writer called to r/s her upcoming appointment as writer's schedule has changed and is no longer offering time slot clt is in. kary

## 2024-08-30 DIAGNOSIS — Z98.84 HISTORY OF ROUX-EN-Y GASTRIC BYPASS: Primary | ICD-10-CM

## 2024-09-06 ENCOUNTER — OFFICE VISIT (OUTPATIENT)
Dept: BEHAVIORAL/MENTAL HEALTH CLINIC | Age: 54
End: 2024-09-06
Payer: COMMERCIAL

## 2024-09-06 DIAGNOSIS — F43.22 ADJUSTMENT DISORDER WITH ANXIOUS MOOD: Primary | ICD-10-CM

## 2024-09-06 PROCEDURE — 90837 PSYTX W PT 60 MINUTES: CPT | Performed by: COUNSELOR

## 2024-09-06 ASSESSMENT — PATIENT HEALTH QUESTIONNAIRE - PHQ9
8. MOVING OR SPEAKING SO SLOWLY THAT OTHER PEOPLE COULD HAVE NOTICED. OR THE OPPOSITE, BEING SO FIGETY OR RESTLESS THAT YOU HAVE BEEN MOVING AROUND A LOT MORE THAN USUAL: NOT AT ALL
4. FEELING TIRED OR HAVING LITTLE ENERGY: NOT AT ALL
1. LITTLE INTEREST OR PLEASURE IN DOING THINGS: NOT AT ALL
10. IF YOU CHECKED OFF ANY PROBLEMS, HOW DIFFICULT HAVE THESE PROBLEMS MADE IT FOR YOU TO DO YOUR WORK, TAKE CARE OF THINGS AT HOME, OR GET ALONG WITH OTHER PEOPLE: NOT DIFFICULT AT ALL
2. FEELING DOWN, DEPRESSED OR HOPELESS: NOT AT ALL
6. FEELING BAD ABOUT YOURSELF - OR THAT YOU ARE A FAILURE OR HAVE LET YOURSELF OR YOUR FAMILY DOWN: NOT AT ALL
7. TROUBLE CONCENTRATING ON THINGS, SUCH AS READING THE NEWSPAPER OR WATCHING TELEVISION: NOT AT ALL
5. POOR APPETITE OR OVEREATING: NOT AT ALL
3. TROUBLE FALLING OR STAYING ASLEEP: NOT AT ALL
SUM OF ALL RESPONSES TO PHQ QUESTIONS 1-9: 0
SUM OF ALL RESPONSES TO PHQ9 QUESTIONS 1 & 2: 0
SUM OF ALL RESPONSES TO PHQ QUESTIONS 1-9: 0
9. THOUGHTS THAT YOU WOULD BE BETTER OFF DEAD, OR OF HURTING YOURSELF: NOT AT ALL

## 2024-09-06 ASSESSMENT — ANXIETY QUESTIONNAIRES
2. NOT BEING ABLE TO STOP OR CONTROL WORRYING: SEVERAL DAYS
GAD7 TOTAL SCORE: 3
3. WORRYING TOO MUCH ABOUT DIFFERENT THINGS: SEVERAL DAYS
5. BEING SO RESTLESS THAT IT IS HARD TO SIT STILL: NOT AT ALL
6. BECOMING EASILY ANNOYED OR IRRITABLE: SEVERAL DAYS
7. FEELING AFRAID AS IF SOMETHING AWFUL MIGHT HAPPEN: NOT AT ALL
4. TROUBLE RELAXING: NOT AT ALL
1. FEELING NERVOUS, ANXIOUS, OR ON EDGE: NOT AT ALL
IF YOU CHECKED OFF ANY PROBLEMS ON THIS QUESTIONNAIRE, HOW DIFFICULT HAVE THESE PROBLEMS MADE IT FOR YOU TO DO YOUR WORK, TAKE CARE OF THINGS AT HOME, OR GET ALONG WITH OTHER PEOPLE: NOT DIFFICULT AT ALL
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
## 2024-09-13 DIAGNOSIS — M81.0 OSTEOPOROSIS WITHOUT CURRENT PATHOLOGICAL FRACTURE, UNSPECIFIED OSTEOPOROSIS TYPE: Primary | ICD-10-CM

## 2024-09-14 DIAGNOSIS — Z98.84 HX OF BARIATRIC SURGERY: Primary | ICD-10-CM

## 2024-09-14 DIAGNOSIS — E16.2 HYPOGLYCEMIA: ICD-10-CM

## 2024-09-16 RX ORDER — ACYCLOVIR 400 MG/1
TABLET ORAL
Qty: 9 EACH | Refills: 3 | Status: SHIPPED | OUTPATIENT
Start: 2024-09-16

## 2024-09-18 ENCOUNTER — TELEPHONE (OUTPATIENT)
Dept: ONCOLOGY | Age: 54
End: 2024-09-18

## 2024-09-18 ENCOUNTER — OFFICE VISIT (OUTPATIENT)
Dept: BARIATRICS/WEIGHT MGMT | Age: 54
End: 2024-09-18
Payer: COMMERCIAL

## 2024-09-18 VITALS
DIASTOLIC BLOOD PRESSURE: 80 MMHG | HEIGHT: 66 IN | HEART RATE: 80 BPM | SYSTOLIC BLOOD PRESSURE: 130 MMHG | OXYGEN SATURATION: 97 % | BODY MASS INDEX: 26.36 KG/M2 | WEIGHT: 164 LBS | RESPIRATION RATE: 18 BRPM

## 2024-09-18 DIAGNOSIS — Z86.711 HISTORY OF PULMONARY EMBOLISM: ICD-10-CM

## 2024-09-18 DIAGNOSIS — M85.88 OSTEOPENIA OF LUMBAR SPINE: ICD-10-CM

## 2024-09-18 DIAGNOSIS — Z98.890 STATUS POST PANNICULECTOMY: ICD-10-CM

## 2024-09-18 DIAGNOSIS — G47.33 OSA (OBSTRUCTIVE SLEEP APNEA): Primary | ICD-10-CM

## 2024-09-18 DIAGNOSIS — F33.40 RECURRENT MAJOR DEPRESSIVE DISORDER, IN REMISSION (HCC): ICD-10-CM

## 2024-09-18 DIAGNOSIS — K21.9 GASTROESOPHAGEAL REFLUX DISEASE WITHOUT ESOPHAGITIS: ICD-10-CM

## 2024-09-18 DIAGNOSIS — R42 VERTIGO: ICD-10-CM

## 2024-09-18 DIAGNOSIS — E78.5 HYPERLIPIDEMIA, UNSPECIFIED HYPERLIPIDEMIA TYPE: ICD-10-CM

## 2024-09-18 DIAGNOSIS — K59.00 CONSTIPATION, UNSPECIFIED CONSTIPATION TYPE: ICD-10-CM

## 2024-09-18 DIAGNOSIS — M19.90 ARTHRITIS: ICD-10-CM

## 2024-09-18 DIAGNOSIS — Z90.710 HISTORY OF HYSTERECTOMY: ICD-10-CM

## 2024-09-18 DIAGNOSIS — Z90.49 HISTORY OF CHOLECYSTECTOMY: ICD-10-CM

## 2024-09-18 DIAGNOSIS — R73.03 PREDIABETES: ICD-10-CM

## 2024-09-18 DIAGNOSIS — E66.3 OVERWEIGHT (BMI 25.0-29.9): ICD-10-CM

## 2024-09-18 PROCEDURE — 99213 OFFICE O/P EST LOW 20 MIN: CPT | Performed by: NURSE PRACTITIONER

## 2024-09-19 ENCOUNTER — PATIENT MESSAGE (OUTPATIENT)
Dept: FAMILY MEDICINE CLINIC | Age: 54
End: 2024-09-19

## 2024-09-19 ENCOUNTER — HOSPITAL ENCOUNTER (OUTPATIENT)
Age: 54
Setting detail: SPECIMEN
Discharge: HOME | End: 2024-09-19

## 2024-09-19 DIAGNOSIS — M85.88 OSTEOPENIA OF LUMBAR SPINE: ICD-10-CM

## 2024-09-19 DIAGNOSIS — Z86.711 HISTORY OF PULMONARY EMBOLISM: ICD-10-CM

## 2024-09-19 DIAGNOSIS — Z98.890 STATUS POST PANNICULECTOMY: ICD-10-CM

## 2024-09-19 DIAGNOSIS — E78.5 HYPERLIPIDEMIA, UNSPECIFIED HYPERLIPIDEMIA TYPE: ICD-10-CM

## 2024-09-19 DIAGNOSIS — G47.33 OSA (OBSTRUCTIVE SLEEP APNEA): ICD-10-CM

## 2024-09-19 DIAGNOSIS — Z90.710 HISTORY OF HYSTERECTOMY: ICD-10-CM

## 2024-09-19 DIAGNOSIS — Z98.84 HISTORY OF ROUX-EN-Y GASTRIC BYPASS: ICD-10-CM

## 2024-09-19 DIAGNOSIS — E55.9 VITAMIN D DEFICIENCY: Primary | ICD-10-CM

## 2024-09-19 DIAGNOSIS — M19.90 ARTHRITIS: ICD-10-CM

## 2024-09-19 DIAGNOSIS — F33.40 RECURRENT MAJOR DEPRESSIVE DISORDER, IN REMISSION (HCC): ICD-10-CM

## 2024-09-19 DIAGNOSIS — R42 VERTIGO: ICD-10-CM

## 2024-09-19 DIAGNOSIS — K21.9 GASTROESOPHAGEAL REFLUX DISEASE WITHOUT ESOPHAGITIS: ICD-10-CM

## 2024-09-19 DIAGNOSIS — Z90.49 HISTORY OF CHOLECYSTECTOMY: ICD-10-CM

## 2024-09-19 DIAGNOSIS — E66.3 OVERWEIGHT (BMI 25.0-29.9): ICD-10-CM

## 2024-09-19 DIAGNOSIS — R73.03 PREDIABETES: ICD-10-CM

## 2024-09-19 LAB
25(OH)D3 SERPL-MCNC: 11 NG/ML (ref 30–100)
25(OH)D3 SERPL-MCNC: 11 NG/ML (ref 30–100)
ALBUMIN SERPL-MCNC: 4.5 G/DL (ref 3.5–5.2)
ALBUMIN/GLOB SERPL: 2 {RATIO} (ref 1–2.5)
ALP SERPL-CCNC: 85 U/L (ref 35–104)
ALT SERPL-CCNC: 14 U/L (ref 10–35)
ANION GAP SERPL CALCULATED.3IONS-SCNC: 10 MMOL/L (ref 9–16)
AST SERPL-CCNC: 20 U/L (ref 10–35)
BASOPHILS # BLD: 0.06 K/UL (ref 0–0.2)
BASOPHILS NFR BLD: 1 % (ref 0–2)
BILIRUB SERPL-MCNC: 0.3 MG/DL (ref 0–1.2)
BUN SERPL-MCNC: 11 MG/DL (ref 6–20)
CA-I BLD-SCNC: 1.22 MMOL/L (ref 1.13–1.33)
CALCIUM SERPL-MCNC: 9 MG/DL (ref 8.6–10.4)
CHLORIDE SERPL-SCNC: 107 MMOL/L (ref 98–107)
CHOLEST SERPL-MCNC: 176 MG/DL (ref 0–199)
CHOLESTEROL/HDL RATIO: 4
CO2 SERPL-SCNC: 27 MMOL/L (ref 20–31)
CREAT SERPL-MCNC: 0.8 MG/DL (ref 0.5–0.9)
EOSINOPHIL # BLD: 0.19 K/UL (ref 0–0.44)
EOSINOPHILS RELATIVE PERCENT: 3 % (ref 1–4)
ERYTHROCYTE [DISTWIDTH] IN BLOOD BY AUTOMATED COUNT: 13.4 % (ref 11.8–14.4)
EST. AVERAGE GLUCOSE BLD GHB EST-MCNC: 105 MG/DL
FERRITIN SERPL-MCNC: 14 NG/ML (ref 13–150)
FERRITIN SERPL-MCNC: 14 NG/ML (ref 13–150)
FOLATE SERPL-MCNC: 5.7 NG/ML (ref 4.8–24.2)
FOLATE SERPL-MCNC: 5.7 NG/ML (ref 4.8–24.2)
GFR, ESTIMATED: 85 ML/MIN/1.73M2
GLUCOSE SERPL-MCNC: 86 MG/DL (ref 74–99)
HBA1C MFR BLD: 5.3 % (ref 4–6)
HCT VFR BLD AUTO: 42.9 % (ref 36.3–47.1)
HDLC SERPL-MCNC: 45 MG/DL
HGB BLD-MCNC: 13.8 G/DL (ref 11.9–15.1)
IMM GRANULOCYTES # BLD AUTO: <0.03 K/UL (ref 0–0.3)
IMM GRANULOCYTES NFR BLD: 0 %
IRON SATN MFR SERPL: 15 % (ref 20–55)
IRON SATN MFR SERPL: 15 % (ref 20–55)
IRON SERPL-MCNC: 65 UG/DL (ref 37–145)
IRON SERPL-MCNC: 65 UG/DL (ref 37–145)
LDLC SERPL CALC-MCNC: 112 MG/DL (ref 0–100)
LYMPHOCYTES NFR BLD: 1.96 K/UL (ref 1.1–3.7)
LYMPHOCYTES RELATIVE PERCENT: 30 % (ref 24–43)
MAGNESIUM SERPL-MCNC: 2.1 MG/DL (ref 1.6–2.6)
MCH RBC QN AUTO: 27.8 PG (ref 25.2–33.5)
MCHC RBC AUTO-ENTMCNC: 32.2 G/DL (ref 28.4–34.8)
MCV RBC AUTO: 86.3 FL (ref 82.6–102.9)
MONOCYTES NFR BLD: 0.42 K/UL (ref 0.1–1.2)
MONOCYTES NFR BLD: 7 % (ref 3–12)
NEUTROPHILS NFR BLD: 59 % (ref 36–65)
NEUTS SEG NFR BLD: 3.85 K/UL (ref 1.5–8.1)
NRBC BLD-RTO: 0 PER 100 WBC
PLATELET # BLD AUTO: 278 K/UL (ref 138–453)
PMV BLD AUTO: 9.8 FL (ref 8.1–13.5)
POTASSIUM SERPL-SCNC: 4.2 MMOL/L (ref 3.7–5.3)
PROT SERPL-MCNC: 6.9 G/DL (ref 6.6–8.7)
PTH-INTACT SERPL-MCNC: 37 PG/ML (ref 15–65)
PTH-INTACT SERPL-MCNC: 37 PG/ML (ref 15–65)
RBC # BLD AUTO: 4.97 M/UL (ref 3.95–5.11)
SODIUM SERPL-SCNC: 144 MMOL/L (ref 136–145)
T4 FREE SERPL-MCNC: 1 NG/DL (ref 0.92–1.68)
TIBC SERPL-MCNC: 429 UG/DL (ref 250–450)
TIBC SERPL-MCNC: 429 UG/DL (ref 250–450)
TRIGL SERPL-MCNC: 92 MG/DL
TSH SERPL DL<=0.05 MIU/L-ACNC: 2.93 UIU/ML (ref 0.27–4.2)
UNSATURATED IRON BINDING CAPACITY: 364 UG/DL (ref 112–347)
UNSATURATED IRON BINDING CAPACITY: 364 UG/DL (ref 112–347)
VIT B12 SERPL-MCNC: 298 PG/ML (ref 232–1245)
VIT B12 SERPL-MCNC: 298 PG/ML (ref 232–1245)
VLDLC SERPL CALC-MCNC: 18 MG/DL
WBC OTHER # BLD: 6.5 K/UL (ref 3.5–11.3)

## 2024-09-19 RX ORDER — ERGOCALCIFEROL 1.25 MG/1
50000 CAPSULE, LIQUID FILLED ORAL WEEKLY
Qty: 12 CAPSULE | Refills: 0 | Status: SHIPPED | OUTPATIENT
Start: 2024-09-19 | End: 2024-12-06

## 2024-09-22 LAB
RETINYL PALMITATE: <0.02 MG/L (ref 0–0.1)
VITAMIN A LEVEL: 0.32 MG/L (ref 0.3–1.2)
VITAMIN A, INTERP: NORMAL
ZINC SERPL-MCNC: 86.9 UG/DL (ref 60–120)

## 2024-09-23 ENCOUNTER — PATIENT MESSAGE (OUTPATIENT)
Dept: FAMILY MEDICINE CLINIC | Age: 54
End: 2024-09-23

## 2024-09-23 LAB — VIT B1 PYROPHOSHATE BLD-SCNC: 150 NMOL/L (ref 70–180)

## 2024-09-24 RX ORDER — CYANOCOBALAMIN 1000 UG/ML
1000 INJECTION, SOLUTION INTRAMUSCULAR; SUBCUTANEOUS
Qty: 10 ML | Refills: 1 | Status: SHIPPED | OUTPATIENT
Start: 2024-09-24

## 2024-09-26 ENCOUNTER — TELEPHONE (OUTPATIENT)
Dept: FAMILY MEDICINE CLINIC | Age: 54
End: 2024-09-26

## 2024-10-01 DIAGNOSIS — Z87.891 PERSONAL HISTORY OF TOBACCO USE, PRESENTING HAZARDS TO HEALTH: Primary | ICD-10-CM

## 2024-10-10 ENCOUNTER — OFFICE VISIT (OUTPATIENT)
Dept: BARIATRICS/WEIGHT MGMT | Age: 54
End: 2024-10-10
Payer: COMMERCIAL

## 2024-10-10 VITALS
HEART RATE: 78 BPM | BODY MASS INDEX: 26.36 KG/M2 | DIASTOLIC BLOOD PRESSURE: 78 MMHG | WEIGHT: 164 LBS | OXYGEN SATURATION: 98 % | SYSTOLIC BLOOD PRESSURE: 112 MMHG | HEIGHT: 66 IN

## 2024-10-10 DIAGNOSIS — E55.9 VITAMIN D DEFICIENCY: ICD-10-CM

## 2024-10-10 DIAGNOSIS — R73.03 PREDIABETES: ICD-10-CM

## 2024-10-10 DIAGNOSIS — M85.88 OSTEOPENIA OF LUMBAR SPINE: ICD-10-CM

## 2024-10-10 DIAGNOSIS — Z98.84 HX OF BARIATRIC SURGERY: ICD-10-CM

## 2024-10-10 DIAGNOSIS — M19.90 ARTHRITIS: ICD-10-CM

## 2024-10-10 DIAGNOSIS — E66.3 OVERWEIGHT (BMI 25.0-29.9): ICD-10-CM

## 2024-10-10 DIAGNOSIS — K21.9 GASTROESOPHAGEAL REFLUX DISEASE WITHOUT ESOPHAGITIS: ICD-10-CM

## 2024-10-10 DIAGNOSIS — G47.33 OSA (OBSTRUCTIVE SLEEP APNEA): Primary | ICD-10-CM

## 2024-10-10 DIAGNOSIS — E78.5 HYPERLIPIDEMIA, UNSPECIFIED HYPERLIPIDEMIA TYPE: ICD-10-CM

## 2024-10-10 DIAGNOSIS — Z86.711 HISTORY OF PULMONARY EMBOLISM: ICD-10-CM

## 2024-10-10 PROCEDURE — 99213 OFFICE O/P EST LOW 20 MIN: CPT | Performed by: NURSE PRACTITIONER

## 2024-10-10 NOTE — PROGRESS NOTES
Medical Nutrition Therapy for Metabolic and Bariatric Surgery  Nutrition Follow-Up: Weight Loss    Nutrition Assessment:  Patient is here for back on track, patient had the RNY Bypass in 2016.  Patient reports  tolerating diet, always eating small, frequent meals, always eating protein first, always protein portion with all meals and snacks.   Patient is not drinking at least 64 oz of fluid and sugar free beverages. Patient typically drinking blue powerade zero, coffee with sugar free creamer, water. 5  Patient reports consistently taking vitamins and minerals.   Patient reports not adding physical activity to daily life to remain active.   Discussed increasing water intake, consistency taking vitamins.    24-hr diet recall scanned into chart.    Multivitamin/Mineral Intake: Yes, Bariatric Advantage with iron 1x/day    Calcium Intake: Yes, 1000 mg . Sometimes will miss her second     Vitals:   Vitals:    10/10/24 0948   BP: 112/78   Site: Left Upper Arm   Position: Sitting   Cuff Size: Large Adult   Pulse: 78   SpO2: 98%   Weight: 74.4 kg (164 lb)   Height: 1.676 m (5' 6\")      Body mass index is 26.47 kg/m².    Nutrition Diagnosis:  Inadequate food and beverage intake r/t WLS as evidenced by loss of excess body weight stable/unchanged x 1 month.    Nutrition Intervention:  Diet: Bariatric Diet, 60-80 gm of protein, and 48-64 oz of fluid    Nutrition Education: Bariatric Binder (diet guidelines and recipes)    Goal:   Continue bariatric diet and continue to add variety to diet as tolerated.   Sip on water or sugar-free fluid throughout the day.   Eat small, frequent meals containing protein, as tolerated.   Consistently take MVIs and minerals to prevent nutrient deficiencies.   Discuss activity with physician and determine a plan for remaining active.   Recommend bariatric support groups.    Monitor/Evaluate:  Diet tolerance, protein intake, vitamin adherence, fluid intake, frequency of meals/snacks, activity, and

## 2024-10-10 NOTE — PROGRESS NOTES
Post-op Bariatric Surgery Note    Subjective     Patient is a transfer of care. She is s/p gastric bypass at [facility] in 2016 with Dr Galeano.  She has not had f/u with her bariatric for quite some time.  Pre-surgery weight was 223 lbs.  Current weight 164 lbs, down 59 lbs.  She had panniculectomy and abdominoplasty with Dr Tripp in 2021.  Hx bilateral PE in 2022.  Was on Eliquis, but not currently.  Sh started back on track visits and is doing much better with eating behaviors.  Needs to work on physical activity.  Hypoglycemia improved. Blood sugars running 70-80's.   Constipation improved.  Taking MVI regularly and working on calcium BID.   Not using CPAP.  She is having fatigue and snoring.  Recently had B12 injection. Has not felt any difference in energy level as of yet.  Hx osteopenia.  No pain or other specific problems or concerns.      Allergies:  Allergies   Allergen Reactions    Nsaids     Nalbuphine Nausea And Vomiting    Sulfamethoxazole-Trimethoprim Hives and Rash       Past Medical History:     Past Medical History:   Diagnosis Date    Abnormal findings on esophagogastroduodenoscopy (EGD) 06/12/2020    Gastritis, tiny hiatal hernia, candy cane deformity of gastrojejunostomy    Arthritis     Chronic constipation     Gastritis     GERD (gastroesophageal reflux disease)     H. pylori infection     Headache(784.0)     Hiatal hernia     Tiny per EGD 6-12-20    Intertrigo 6/16/2021    Prolonged emergence from general anesthesia     Sleep apnea     Pt states has resolved since gastric bypass surgery.    .    Past Surgical History:  Past Surgical History:   Procedure Laterality Date    ABDOMINOPLASTY N/A 11/04/2021    ABDOMINOPLASTY WITH BIOPATCH AROUND PANTERA DRAIN AND INCISIONAL WOUND VAC WITH PRE OP TAP BLOCK performed by Renetta Tripp MD at [facility] OR    CHOLECYSTECTOMY  11/1991    COLONOSCOPY      COLONOSCOPY N/A 03/10/2021    COLONOSCOPY DIAGNOSTIC performed by Toni Alonso MD at [facility]

## 2024-10-14 ENCOUNTER — APPOINTMENT (OUTPATIENT)
Dept: GENERAL RADIOLOGY | Age: 54
End: 2024-10-14
Payer: COMMERCIAL

## 2024-10-14 ENCOUNTER — HOSPITAL ENCOUNTER (EMERGENCY)
Age: 54
Discharge: HOME | End: 2024-10-14
Attending: EMERGENCY MEDICINE
Payer: COMMERCIAL

## 2024-10-14 VITALS
TEMPERATURE: 98.8 F | BODY MASS INDEX: 26.36 KG/M2 | SYSTOLIC BLOOD PRESSURE: 104 MMHG | OXYGEN SATURATION: 97 % | DIASTOLIC BLOOD PRESSURE: 66 MMHG | HEIGHT: 66 IN | HEART RATE: 75 BPM | WEIGHT: 164 LBS | RESPIRATION RATE: 18 BRPM

## 2024-10-14 DIAGNOSIS — S76.311A STRAIN OF RIGHT HAMSTRING, INITIAL ENCOUNTER: Primary | ICD-10-CM

## 2024-10-14 PROCEDURE — 96372 THER/PROPH/DIAG INJ SC/IM: CPT

## 2024-10-14 PROCEDURE — 99284 EMERGENCY DEPT VISIT MOD MDM: CPT

## 2024-10-14 PROCEDURE — 6360000002 HC RX W HCPCS: Performed by: EMERGENCY MEDICINE

## 2024-10-14 PROCEDURE — 73502 X-RAY EXAM HIP UNI 2-3 VIEWS: CPT

## 2024-10-14 RX ORDER — KETOROLAC TROMETHAMINE 30 MG/ML
30 INJECTION, SOLUTION INTRAMUSCULAR; INTRAVENOUS ONCE
Status: COMPLETED | OUTPATIENT
Start: 2024-10-14 | End: 2024-10-14

## 2024-10-14 RX ORDER — ACETAMINOPHEN 500 MG
1000 TABLET ORAL 3 TIMES DAILY PRN
Qty: 30 TABLET | Refills: 0 | Status: SHIPPED | OUTPATIENT
Start: 2024-10-14

## 2024-10-14 RX ADMIN — KETOROLAC TROMETHAMINE 30 MG: 30 INJECTION, SOLUTION INTRAMUSCULAR at 07:53

## 2024-10-14 ASSESSMENT — PAIN SCALES - GENERAL: PAINLEVEL_OUTOF10: 10

## 2024-10-14 NOTE — ED PROVIDER NOTES
EMERGENCY DEPARTMENT ENCOUNTER    Pt Name: Britt Rosario  MRN: [id number]  Birthdate 1970  Date of evaluation: 10/14/24  CHIEF COMPLAINT       Chief Complaint   Patient presents with    Leg Pain     HISTORY OF PRESENT ILLNESS   This is a 53-year-old female that presents emergency department with complaints of pain and discomfort in her right hip and posterior right thigh.  Patient states that she was at a birthday party yesterday and she did a cart wheel, after doing the cart wheel she had severe pain and discomfort in her leg.           REVIEW OF SYSTEMS     Review of Systems  PASTMEDICAL HISTORY     Past Medical History:   Diagnosis Date    Abnormal findings on esophagogastroduodenoscopy (EGD) 06/12/2020    Gastritis, tiny hiatal hernia, candy cane deformity of gastrojejunostomy    Arthritis     Chronic constipation     Gastritis     GERD (gastroesophageal reflux disease)     H. pylori infection     Headache(784.0)     Hiatal hernia     Tiny per EGD 6-12-20    Intertrigo 6/16/2021    Prolonged emergence from general anesthesia     Sleep apnea     Pt states has resolved since gastric bypass surgery.      Past Problem List  Patient Active Problem List   Diagnosis Code    Hx of bariatric surgery Z98.84    Anxiety F41.9    Nausea R11.0    Vitamin D deficiency E55.9    Recurrent major depressive disorder, in remission (HCC) F33.40    Adjustment disorder with mixed anxiety and depressed mood F43.23    Adjustment disorder with anxious mood F43.22    Osteoporosis M81.0    EDDIE (obstructive sleep apnea) G47.33    Gastroesophageal reflux disease without esophagitis K21.9    HLD (hyperlipidemia) E78.5    Prediabetes R73.03    History of pulmonary embolism Z86.711    History of cholecystectomy Z90.49    Status post panniculectomy Z98.890    History of hysterectomy Z90.710    Overweight (BMI 25.0-29.9) E66.3    Arthritis M19.90    Vertigo R42    Osteopenia of lumbar spine M85.88    Constipation K59.00     SURGICAL

## 2024-10-20 ENCOUNTER — OFFICE VISIT (OUTPATIENT)
Dept: PRIMARY CARE CLINIC | Age: 54
End: 2024-10-20
Payer: COMMERCIAL

## 2024-10-20 VITALS
SYSTOLIC BLOOD PRESSURE: 115 MMHG | OXYGEN SATURATION: 98 % | WEIGHT: 164 LBS | DIASTOLIC BLOOD PRESSURE: 83 MMHG | TEMPERATURE: 98.3 F | BODY MASS INDEX: 26.36 KG/M2 | HEIGHT: 66 IN | HEART RATE: 87 BPM

## 2024-10-20 DIAGNOSIS — J01.90 ACUTE BACTERIAL SINUSITIS: Primary | ICD-10-CM

## 2024-10-20 DIAGNOSIS — B96.89 ACUTE BACTERIAL SINUSITIS: Primary | ICD-10-CM

## 2024-10-20 PROCEDURE — 99213 OFFICE O/P EST LOW 20 MIN: CPT

## 2024-10-20 RX ORDER — AZITHROMYCIN 250 MG/1
TABLET, FILM COATED ORAL
Qty: 6 TABLET | Refills: 0 | Status: SHIPPED | OUTPATIENT
Start: 2024-10-20 | End: 2024-10-30

## 2024-11-22 ENCOUNTER — TELEPHONE (OUTPATIENT)
Dept: FAMILY MEDICINE CLINIC | Age: 54
End: 2024-11-22

## 2024-11-22 NOTE — TELEPHONE ENCOUNTER
Penny CEDILLO is calling for an update to see if Prolia was approved. Please call Penny Butterfield @ [phone] ext. [phone] with answer.

## 2024-12-03 ENCOUNTER — HOSPITAL ENCOUNTER (OUTPATIENT)
Dept: SLEEP CENTER | Age: 54
Discharge: HOME | End: 2024-12-05
Payer: COMMERCIAL

## 2024-12-03 VITALS — HEIGHT: 66 IN | WEIGHT: 164 LBS | BODY MASS INDEX: 26.36 KG/M2

## 2024-12-03 DIAGNOSIS — G47.33 OSA (OBSTRUCTIVE SLEEP APNEA): ICD-10-CM

## 2024-12-03 DIAGNOSIS — M19.90 ARTHRITIS: ICD-10-CM

## 2024-12-03 DIAGNOSIS — Z98.84 HX OF BARIATRIC SURGERY: ICD-10-CM

## 2024-12-03 DIAGNOSIS — E78.5 HYPERLIPIDEMIA, UNSPECIFIED HYPERLIPIDEMIA TYPE: ICD-10-CM

## 2024-12-03 DIAGNOSIS — R73.03 PREDIABETES: ICD-10-CM

## 2024-12-03 DIAGNOSIS — K21.9 GASTROESOPHAGEAL REFLUX DISEASE WITHOUT ESOPHAGITIS: ICD-10-CM

## 2024-12-03 DIAGNOSIS — Z86.711 HISTORY OF PULMONARY EMBOLISM: ICD-10-CM

## 2024-12-03 DIAGNOSIS — E55.9 VITAMIN D DEFICIENCY: ICD-10-CM

## 2024-12-03 DIAGNOSIS — E66.3 OVERWEIGHT (BMI 25.0-29.9): ICD-10-CM

## 2024-12-03 DIAGNOSIS — M85.88 OSTEOPENIA OF LUMBAR SPINE: ICD-10-CM

## 2024-12-03 PROCEDURE — 95810 POLYSOM 6/> YRS 4/> PARAM: CPT

## 2024-12-19 ENCOUNTER — PATIENT MESSAGE (OUTPATIENT)
Dept: FAMILY MEDICINE CLINIC | Age: 54
End: 2024-12-19

## 2024-12-21 LAB — STATUS: NORMAL

## 2024-12-23 ENCOUNTER — TELEPHONE (OUTPATIENT)
Dept: BARIATRICS/WEIGHT MGMT | Age: 54
End: 2024-12-23

## 2024-12-27 DIAGNOSIS — G47.33 OSA (OBSTRUCTIVE SLEEP APNEA): Primary | ICD-10-CM

## 2024-12-31 ENCOUNTER — HOSPITAL ENCOUNTER (OUTPATIENT)
Age: 54
Discharge: HOME | End: 2025-01-02
Payer: COMMERCIAL

## 2024-12-31 ENCOUNTER — HOSPITAL ENCOUNTER (OUTPATIENT)
Dept: GENERAL RADIOLOGY | Age: 54
Discharge: HOME | End: 2025-01-02
Payer: COMMERCIAL

## 2024-12-31 ENCOUNTER — TELEPHONE (OUTPATIENT)
Dept: FAMILY MEDICINE CLINIC | Age: 54
End: 2024-12-31

## 2024-12-31 DIAGNOSIS — R05.2 SUBACUTE COUGH: ICD-10-CM

## 2024-12-31 PROCEDURE — 71046 X-RAY EXAM CHEST 2 VIEWS: CPT

## 2024-12-31 NOTE — TELEPHONE ENCOUNTER
Pt states \"I have a sore throat ears itch nasal congestion and some chest tightness.\"      A zpack and chest Xray is requested.

## 2025-01-06 ENCOUNTER — OFFICE VISIT (OUTPATIENT)
Dept: FAMILY MEDICINE CLINIC | Age: 55
End: 2025-01-06
Payer: COMMERCIAL

## 2025-01-06 VITALS
BODY MASS INDEX: 26.24 KG/M2 | TEMPERATURE: 97 F | DIASTOLIC BLOOD PRESSURE: 84 MMHG | SYSTOLIC BLOOD PRESSURE: 121 MMHG | OXYGEN SATURATION: 99 % | WEIGHT: 162.6 LBS | HEART RATE: 75 BPM

## 2025-01-06 DIAGNOSIS — B96.89 ACUTE BACTERIAL SINUSITIS: Primary | ICD-10-CM

## 2025-01-06 DIAGNOSIS — R07.89 DISCOMFORT IN CHEST: ICD-10-CM

## 2025-01-06 DIAGNOSIS — J01.90 ACUTE BACTERIAL SINUSITIS: Primary | ICD-10-CM

## 2025-01-06 PROCEDURE — 99213 OFFICE O/P EST LOW 20 MIN: CPT | Performed by: FAMILY MEDICINE

## 2025-01-06 RX ORDER — PREDNISONE 20 MG/1
20 TABLET ORAL DAILY
Qty: 5 TABLET | Refills: 0 | Status: SHIPPED | OUTPATIENT
Start: 2025-01-06 | End: 2025-01-11

## 2025-01-06 ASSESSMENT — PATIENT HEALTH QUESTIONNAIRE - PHQ9
9. THOUGHTS THAT YOU WOULD BE BETTER OFF DEAD, OR OF HURTING YOURSELF: NOT AT ALL
6. FEELING BAD ABOUT YOURSELF - OR THAT YOU ARE A FAILURE OR HAVE LET YOURSELF OR YOUR FAMILY DOWN: NOT AT ALL
5. POOR APPETITE OR OVEREATING: NOT AT ALL
SUM OF ALL RESPONSES TO PHQ QUESTIONS 1-9: 0
7. TROUBLE CONCENTRATING ON THINGS, SUCH AS READING THE NEWSPAPER OR WATCHING TELEVISION: NOT AT ALL
4. FEELING TIRED OR HAVING LITTLE ENERGY: NOT AT ALL
SUM OF ALL RESPONSES TO PHQ QUESTIONS 1-9: 0
SUM OF ALL RESPONSES TO PHQ QUESTIONS 1-9: 0
SUM OF ALL RESPONSES TO PHQ9 QUESTIONS 1 & 2: 0
8. MOVING OR SPEAKING SO SLOWLY THAT OTHER PEOPLE COULD HAVE NOTICED. OR THE OPPOSITE, BEING SO FIGETY OR RESTLESS THAT YOU HAVE BEEN MOVING AROUND A LOT MORE THAN USUAL: NOT AT ALL
1. LITTLE INTEREST OR PLEASURE IN DOING THINGS: NOT AT ALL
2. FEELING DOWN, DEPRESSED OR HOPELESS: NOT AT ALL
10. IF YOU CHECKED OFF ANY PROBLEMS, HOW DIFFICULT HAVE THESE PROBLEMS MADE IT FOR YOU TO DO YOUR WORK, TAKE CARE OF THINGS AT HOME, OR GET ALONG WITH OTHER PEOPLE: NOT DIFFICULT AT ALL
SUM OF ALL RESPONSES TO PHQ QUESTIONS 1-9: 0
3. TROUBLE FALLING OR STAYING ASLEEP: NOT AT ALL

## 2025-01-06 NOTE — PROGRESS NOTES
General FM note    Britt Rosario is a 54 y.o. female who presents today for follow up on her  medical conditions as noted below.  Britt Rosario is c/o of   Chief Complaint   Patient presents with    Congestion    Cough       Patient Active Problem List:     Hx of bariatric surgery     Anxiety     Nausea     Vitamin D deficiency     Recurrent major depressive disorder, in remission (HCC)     Adjustment disorder with mixed anxiety and depressed mood     Adjustment disorder with anxious mood     Osteoporosis     EDDIE (obstructive sleep apnea)     Gastroesophageal reflux disease without esophagitis     HLD (hyperlipidemia)     Prediabetes     History of pulmonary embolism     History of cholecystectomy     Status post panniculectomy     History of hysterectomy     Overweight (BMI 25.0-29.9)     Arthritis     Vertigo     Osteopenia of lumbar spine     Constipation     Past Medical History:   Diagnosis Date    Abnormal findings on esophagogastroduodenoscopy (EGD) 06/12/2020    Gastritis, tiny hiatal hernia, candy cane deformity of gastrojejunostomy    Arthritis     Chronic constipation     Gastritis     GERD (gastroesophageal reflux disease)     H. pylori infection     Headache(784.0)     Hiatal hernia     Tiny per EGD 6-12-20    Intertrigo 6/16/2021    Prolonged emergence from general anesthesia     Sleep apnea     Pt states has resolved since gastric bypass surgery.       Past Surgical History:   Procedure Laterality Date    ABDOMINOPLASTY N/A 11/04/2021    ABDOMINOPLASTY WITH BIOPATCH AROUND PANTERA DRAIN AND INCISIONAL WOUND VAC WITH PRE OP TAP BLOCK performed by Renetta Tripp MD at [facility] OR    CHOLECYSTECTOMY  11/1991    COLONOSCOPY      COLONOSCOPY N/A 03/10/2021    COLONOSCOPY DIAGNOSTIC performed by Toni Alonso MD at [facility]    COSMETIC SURGERY  11/04/2021    PANNICULECTOMY (N/A Abdomen)     COSMETIC SURGERY  11/04/2021    ABDOMINOPLASTY WITH BIOPATCH AROUND PANTERA DRAIN AND INCISIONAL WOUND VAC

## 2025-01-17 ENCOUNTER — HOSPITAL ENCOUNTER (OUTPATIENT)
Dept: CT IMAGING | Age: 55
Discharge: HOME | End: 2025-01-19
Payer: COMMERCIAL

## 2025-01-17 DIAGNOSIS — R07.89 DISCOMFORT IN CHEST: ICD-10-CM

## 2025-01-17 PROCEDURE — 6360000004 HC RX CONTRAST MEDICATION: Performed by: FAMILY MEDICINE

## 2025-01-17 PROCEDURE — 71260 CT THORAX DX C+: CPT

## 2025-01-17 PROCEDURE — 2580000003 HC RX 258: Performed by: FAMILY MEDICINE

## 2025-01-17 PROCEDURE — 2500000003 HC RX 250 WO HCPCS: Performed by: FAMILY MEDICINE

## 2025-01-17 RX ORDER — 0.9 % SODIUM CHLORIDE 0.9 %
80 INTRAVENOUS SOLUTION INTRAVENOUS ONCE
Status: COMPLETED | OUTPATIENT
Start: 2025-01-17 | End: 2025-01-17

## 2025-01-17 RX ORDER — SODIUM CHLORIDE 0.9 % (FLUSH) 0.9 %
10 SYRINGE (ML) INJECTION ONCE
Status: COMPLETED | OUTPATIENT
Start: 2025-01-17 | End: 2025-01-17

## 2025-01-17 RX ORDER — IOPAMIDOL 755 MG/ML
75 INJECTION, SOLUTION INTRAVASCULAR
Status: COMPLETED | OUTPATIENT
Start: 2025-01-17 | End: 2025-01-17

## 2025-01-17 RX ADMIN — SODIUM CHLORIDE 80 ML: 9 INJECTION, SOLUTION INTRAVENOUS at 17:40

## 2025-01-17 RX ADMIN — SODIUM CHLORIDE, PRESERVATIVE FREE 10 ML: 5 INJECTION INTRAVENOUS at 17:39

## 2025-01-17 RX ADMIN — IOPAMIDOL 75 ML: 755 INJECTION, SOLUTION INTRAVENOUS at 17:49

## 2025-01-23 ENCOUNTER — ANESTHESIA EVENT (OUTPATIENT)
Dept: OPERATING ROOM | Age: 55
End: 2025-01-23
Payer: COMMERCIAL

## 2025-01-23 ENCOUNTER — PREP FOR PROCEDURE (OUTPATIENT)
Dept: BARIATRICS/WEIGHT MGMT | Age: 55
End: 2025-01-23

## 2025-01-23 DIAGNOSIS — R11.0 NAUSEA IN ADULT: ICD-10-CM

## 2025-01-23 DIAGNOSIS — Z98.84 S/P GASTRIC BYPASS: ICD-10-CM

## 2025-01-24 ENCOUNTER — HOSPITAL ENCOUNTER (OUTPATIENT)
Age: 55
Setting detail: OUTPATIENT SURGERY
Discharge: HOME | End: 2025-01-24
Attending: SURGERY | Admitting: SURGERY
Payer: COMMERCIAL

## 2025-01-24 ENCOUNTER — ANESTHESIA (OUTPATIENT)
Dept: OPERATING ROOM | Age: 55
End: 2025-01-24
Payer: COMMERCIAL

## 2025-01-24 VITALS
DIASTOLIC BLOOD PRESSURE: 71 MMHG | HEART RATE: 69 BPM | OXYGEN SATURATION: 99 % | RESPIRATION RATE: 20 BRPM | SYSTOLIC BLOOD PRESSURE: 106 MMHG | TEMPERATURE: 97.7 F

## 2025-01-24 DIAGNOSIS — Z98.84 S/P GASTRIC BYPASS: ICD-10-CM

## 2025-01-24 DIAGNOSIS — R11.0 NAUSEA IN ADULT: ICD-10-CM

## 2025-01-24 PROCEDURE — 43239 EGD BIOPSY SINGLE/MULTIPLE: CPT | Performed by: SURGERY

## 2025-01-24 PROCEDURE — 6360000002 HC RX W HCPCS

## 2025-01-24 PROCEDURE — 7100000011 HC PHASE II RECOVERY - ADDTL 15 MIN: Performed by: SURGERY

## 2025-01-24 PROCEDURE — 2580000003 HC RX 258

## 2025-01-24 PROCEDURE — 88305 TISSUE EXAM BY PATHOLOGIST: CPT

## 2025-01-24 PROCEDURE — 2709999900 HC NON-CHARGEABLE SUPPLY: Performed by: SURGERY

## 2025-01-24 PROCEDURE — 7100000010 HC PHASE II RECOVERY - FIRST 15 MIN: Performed by: SURGERY

## 2025-01-24 PROCEDURE — 3700000000 HC ANESTHESIA ATTENDED CARE: Performed by: SURGERY

## 2025-01-24 PROCEDURE — 3600000002 HC SURGERY LEVEL 2 BASE: Performed by: SURGERY

## 2025-01-24 RX ORDER — ONDANSETRON 2 MG/ML
4 INJECTION INTRAMUSCULAR; INTRAVENOUS
Status: DISCONTINUED | OUTPATIENT
Start: 2025-01-24 | End: 2025-01-24 | Stop reason: HOSPADM

## 2025-01-24 RX ORDER — PROPOFOL 10 MG/ML
INJECTION, EMULSION INTRAVENOUS
Status: DISCONTINUED | OUTPATIENT
Start: 2025-01-24 | End: 2025-01-24 | Stop reason: SDUPTHER

## 2025-01-24 RX ORDER — LIDOCAINE HYDROCHLORIDE 10 MG/ML
INJECTION, SOLUTION EPIDURAL; INFILTRATION; INTRACAUDAL; PERINEURAL
Status: DISCONTINUED | OUTPATIENT
Start: 2025-01-24 | End: 2025-01-24 | Stop reason: SDUPTHER

## 2025-01-24 RX ORDER — SODIUM CHLORIDE 0.9 % (FLUSH) 0.9 %
5-40 SYRINGE (ML) INJECTION PRN
Status: DISCONTINUED | OUTPATIENT
Start: 2025-01-24 | End: 2025-01-24 | Stop reason: HOSPADM

## 2025-01-24 RX ORDER — FENTANYL CITRATE 50 UG/ML
25 INJECTION, SOLUTION INTRAMUSCULAR; INTRAVENOUS EVERY 5 MIN PRN
Status: DISCONTINUED | OUTPATIENT
Start: 2025-01-24 | End: 2025-01-24 | Stop reason: HOSPADM

## 2025-01-24 RX ORDER — SODIUM CHLORIDE 9 MG/ML
INJECTION, SOLUTION INTRAVENOUS PRN
Status: DISCONTINUED | OUTPATIENT
Start: 2025-01-24 | End: 2025-01-24 | Stop reason: HOSPADM

## 2025-01-24 RX ORDER — DIPHENHYDRAMINE HYDROCHLORIDE 50 MG/ML
12.5 INJECTION INTRAMUSCULAR; INTRAVENOUS
Status: DISCONTINUED | OUTPATIENT
Start: 2025-01-24 | End: 2025-01-24 | Stop reason: HOSPADM

## 2025-01-24 RX ORDER — FENTANYL CITRATE 50 UG/ML
50 INJECTION, SOLUTION INTRAMUSCULAR; INTRAVENOUS EVERY 5 MIN PRN
Status: DISCONTINUED | OUTPATIENT
Start: 2025-01-24 | End: 2025-01-24 | Stop reason: HOSPADM

## 2025-01-24 RX ORDER — FENTANYL CITRATE 50 UG/ML
25 INJECTION, SOLUTION INTRAMUSCULAR; INTRAVENOUS
Status: DISCONTINUED | OUTPATIENT
Start: 2025-01-24 | End: 2025-01-24 | Stop reason: HOSPADM

## 2025-01-24 RX ORDER — SODIUM CHLORIDE, SODIUM LACTATE, POTASSIUM CHLORIDE, CALCIUM CHLORIDE 600; 310; 30; 20 MG/100ML; MG/100ML; MG/100ML; MG/100ML
INJECTION, SOLUTION INTRAVENOUS CONTINUOUS
Status: DISCONTINUED | OUTPATIENT
Start: 2025-01-24 | End: 2025-01-24 | Stop reason: HOSPADM

## 2025-01-24 RX ORDER — NALOXONE HYDROCHLORIDE 0.4 MG/ML
INJECTION, SOLUTION INTRAMUSCULAR; INTRAVENOUS; SUBCUTANEOUS PRN
Status: DISCONTINUED | OUTPATIENT
Start: 2025-01-24 | End: 2025-01-24 | Stop reason: HOSPADM

## 2025-01-24 RX ORDER — SODIUM CHLORIDE 0.9 % (FLUSH) 0.9 %
5-40 SYRINGE (ML) INJECTION EVERY 12 HOURS SCHEDULED
Status: DISCONTINUED | OUTPATIENT
Start: 2025-01-24 | End: 2025-01-24 | Stop reason: HOSPADM

## 2025-01-24 RX ORDER — SODIUM CHLORIDE, SODIUM LACTATE, POTASSIUM CHLORIDE, CALCIUM CHLORIDE 600; 310; 30; 20 MG/100ML; MG/100ML; MG/100ML; MG/100ML
INJECTION, SOLUTION INTRAVENOUS
Status: DISCONTINUED | OUTPATIENT
Start: 2025-01-24 | End: 2025-01-24 | Stop reason: SDUPTHER

## 2025-01-24 RX ORDER — FENTANYL CITRATE 50 UG/ML
50 INJECTION, SOLUTION INTRAMUSCULAR; INTRAVENOUS
Status: DISCONTINUED | OUTPATIENT
Start: 2025-01-24 | End: 2025-01-24 | Stop reason: HOSPADM

## 2025-01-24 RX ORDER — LIDOCAINE HYDROCHLORIDE 10 MG/ML
1 INJECTION, SOLUTION INFILTRATION; PERINEURAL
Status: DISCONTINUED | OUTPATIENT
Start: 2025-01-24 | End: 2025-01-24 | Stop reason: HOSPADM

## 2025-01-24 RX ORDER — MIDAZOLAM HYDROCHLORIDE 2 MG/2ML
1 INJECTION, SOLUTION INTRAMUSCULAR; INTRAVENOUS EVERY 10 MIN PRN
Status: DISCONTINUED | OUTPATIENT
Start: 2025-01-24 | End: 2025-01-24 | Stop reason: HOSPADM

## 2025-01-24 RX ORDER — ALBUTEROL SULFATE 0.83 MG/ML
2.5 SOLUTION RESPIRATORY (INHALATION) EVERY 8 HOURS PRN
Status: DISCONTINUED | OUTPATIENT
Start: 2025-01-24 | End: 2025-01-24 | Stop reason: HOSPADM

## 2025-01-24 RX ADMIN — SODIUM CHLORIDE, POTASSIUM CHLORIDE, SODIUM LACTATE AND CALCIUM CHLORIDE: 600; 310; 30; 20 INJECTION, SOLUTION INTRAVENOUS at 10:11

## 2025-01-24 RX ADMIN — PROPOFOL 100 MG: 10 INJECTION, EMULSION INTRAVENOUS at 10:13

## 2025-01-24 RX ADMIN — LIDOCAINE HYDROCHLORIDE 50 MG: 10 INJECTION, SOLUTION EPIDURAL; INFILTRATION; INTRACAUDAL; PERINEURAL at 10:13

## 2025-01-24 RX ADMIN — PROPOFOL 50 MG: 10 INJECTION, EMULSION INTRAVENOUS at 10:16

## 2025-01-24 NOTE — H&P
Subjective     The patient is a 54 y.o. female who is here to undergo EGD for GERD.         Past Medical History:   Diagnosis Date    Arthritis     Chronic constipation     Gastritis     GERD (gastroesophageal reflux disease)     H. pylori infection     Headache(784.0)     Hiatal hernia     Tiny per EGD 6-12-20    Intertrigo 06/16/2021    Prolonged emergence from general anesthesia     Sleep apnea     Pt states has resolved since gastric bypass surgery.    .    Review of Systems - A complete 14 point review of systems was performed.  All was negative unless otherwise documented in HPI.    Allergies:  Allergies   Allergen Reactions    Nsaids     Nalbuphine Nausea And Vomiting    Sulfamethoxazole-Trimethoprim Hives and Rash       Past Surgical History:  Past Surgical History:   Procedure Laterality Date    ABDOMINOPLASTY N/A 11/04/2021    ABDOMINOPLASTY WITH BIOPATCH AROUND PANTERA DRAIN AND INCISIONAL WOUND VAC WITH PRE OP TAP BLOCK performed by Renetta Tripp MD at [facility] OR    CHOLECYSTECTOMY  11/1991    COLONOSCOPY      COLONOSCOPY N/A 03/10/2021    COLONOSCOPY DIAGNOSTIC performed by Toni Alonso MD at [facility] ENDO    ENDOSCOPY, COLON, DIAGNOSTIC  06/12/2020    GASTRIC BYPASS SURGERY  2016    [facility]    HYSTERECTOMY (CERVIX STATUS UNKNOWN)      LAPAROSCOPY      LEEP      LIPECTOMY N/A 11/04/2021    PANNICULECTOMY performed by Renetta Tripp MD at [facility] OR    NASAL SINUS SURGERY      OVARY REMOVAL  03/2005    TUBAL LIGATION         Family History:  Family History   Problem Relation Age of Onset    Cancer Mother 56        ovarian cancer     Ovarian Cancer Mother 55    High Cholesterol Father     Hypertension Father     Heart Disease Father     High Cholesterol Sister     Hypertension Sister     High Cholesterol Brother     Hypertension Brother     Breast Cancer Other     Breast Cancer Other     Breast Cancer Paternal Aunt 55       Social History:  Social History     Socioeconomic History    Marital

## 2025-01-24 NOTE — OP NOTE
[facility]  [facility] OR  [address] [address]  Dept: [phone]  Loc: [phone]    Preoperative Diagnosis: Epigastric Pain    Postoperative Diagnosis:   5-6 cm Candy Cane blind end  No ulcer  Mild gastritis  No hiatal hernia  Normal marian limb for 30 cm    Procedure: Esophagogastroduodenoscopy with biopsy    Surgeon: Ike Goodman,     Findings: As above    EBL: none    Anesthesia: MAC, See Anesthesia Records    Specimen:    Gastric pouch    Operative Narrative:  The risks and benefits of the procedure were explained to the patient in detail, including but not limited to: bleeding, infection, need for further surgery, damage to surrounding structures and perforation.  The patient consented with the procedure.    The patient was taken to the endoscopic suite and placed in lateral position.  Oxygen was administered via nasal cannula and a mouth guard placed.  MAC was administered via the anesthesia team.  The endoscope was then advanced into the oropharynx and passed into the esophagus under direct visualization.  The scope was further advanced under direct visualization into the esophageal lumen and down into the gastric pouch.   The scope was advanced past the anastomosis and the marian limb surveyed.  The marian limb appeared patent and without signs of ischemia.  The scope was withdrawn and the blind pouch surveyed, without lesion.  The scope withdrawn and a gastric pouch biopsy taken. The marian limb and gastric pouch without trauma or bleeding. The esophagus without lesion    The patient tolerated the procedure well    CT abdomen/pelvis

## 2025-01-24 NOTE — DISCHARGE INSTRUCTIONS
POST-ENDOSCOPY INSTRUCTIONS    1. ACTIVITY   No driving, operating machinery, or making important decisions for 24 hours.    Resume normal activity after 24 hours.  You may return to work after 24 hours.    2. DIET    EGD: Resume your usual diet unless specified below.                Diet Modification: Regular    3. MEDICATIONS  (Do not consume alcohol, tranquilizers, or sleeping medications for 24 hours unless advised by your physician)                 Resume your usual medications    4. PHYSICIAN FOLLOW-UP                 Please continue with your previously scheduled appointments                 See your primary care physician as planned.      6. NORMAL CHANGES YOU MAY EXPERIENCE AFTER ENDOSCOPY:      EGD:  Sore throat, some abdominal pain and cramping.            7. CALL YOUR PHYSICIAN IF YOU EXPERIENCE ANY OF THE FOLLOWING      A.  Passing blood rectally or vomiting blood (color may be red or black)      B.  Severe abdominal pain or tenderness (that is not relieved by passing air)      C.  Fever, chills, or excessive sweating      D.  Persistent nausea or vomiting      E.  Redness or swelling at the IV site    If you have additional questions, PLEASE call your doctor or the [facility] at [phone]

## 2025-01-24 NOTE — ANESTHESIA POSTPROCEDURE EVALUATION
Department of Anesthesiology  Postprocedure Note    Patient: Britt Rosario  MRN: [id number]  YOB: 1970  Date of evaluation: 1/24/2025    Procedure Summary       Date: 01/24/25 Room / Location: [address]    Anesthesia Start: 1011 Anesthesia Stop: 1023    Procedure: ESOPHAGOGASTRODUODENOSCOPY BIOPSY Diagnosis:       Nausea in adult      S/P gastric bypass      (Nausea in adult [R11.0])      (S/P gastric bypass [Z98.84])    Surgeons: Ike Goodman DO Responsible Provider: Carlos Aburto MD    Anesthesia Type: MAC ASA Status: 2            Anesthesia Type: No value filed.    Janki Phase I: Janki Score: 9    Janki Phase II: Janki Score: 10    Anesthesia Post Evaluation    Patient location during evaluation: PACU  Patient participation: complete - patient participated  Level of consciousness: awake  Pain score: 1  Airway patency: patent  Nausea & Vomiting: no nausea and no vomiting  Cardiovascular status: blood pressure returned to baseline and hemodynamically stable  Respiratory status: acceptable  Hydration status: euvolemic  Pain management: adequate    No notable events documented.

## 2025-01-24 NOTE — ANESTHESIA PRE PROCEDURE
input(s): \"POCGLU\", \"POCNA\", \"POCK\", \"POCCL\", \"POCBUN\", \"POCHEMO\", \"POCHCT\" in the last 72 hours.    Coags:   Lab Results   Component Value Date/Time    PROTIME 13.5 01/01/2022 05:30 AM    INR 1.0 01/01/2022 05:30 AM    APTT 138.0 01/01/2022 05:30 AM       HCG (If Applicable):   No results found for: \"PREGTESTUR\", \"PREGSERUM\", \"HCG\", \"HCGQUANT\"       ABGs: No results found for: \"PHART\", \"PO2ART\", \"WZI7WUO\", \"SPJ6LPY\", \"BEART\", \"A3TGQHZK\"     Type & Screen (If Applicable):  No results found for: \"LABABO\"    Drug/Infectious Status (If Applicable):  No results found for: \"HIV\", \"HEPCAB\"    COVID-19 Screening (If Applicable):   Lab Results   Component Value Date/Time    COVID19 POSITIVE 12/12/2023 02:35 PM    COVID19 Not Detected 10/04/2023 12:26 AM    COVID19 Not Detected 01/02/2022 10:38 AM           Anesthesia Evaluation  Patient summary reviewed and Nursing notes reviewed   history of anesthetic complications:   Airway: Mallampati: II  TM distance: >3 FB   Neck ROM: full  Mouth opening: > = 3 FB   Dental: normal exam         Pulmonary:normal exam  breath sounds clear to auscultation  (+)     sleep apnea:                                  Cardiovascular:Negative CV ROS  Exercise tolerance: no interval change          Rhythm: regular  Rate: normal                    Neuro/Psych:   (+) neuromuscular disease:, headaches:depression/anxiety             GI/Hepatic/Renal:   (+) hiatal hernia, GERD: no interval change          Endo/Other:    (+) : arthritis: OA and no interval change..                 Abdominal:       Abdomen: soft.      Vascular: negative vascular ROS.         Other Findings:             Anesthesia Plan      MAC     ASA 2     (GA and TAP block)  Induction: intravenous.      Anesthetic plan and risks discussed with patient.      Plan discussed with CRNA.                Narrative & Impression    Normal sinus rhythm  Normal ECG  When compared with ECG of 04-OCT-2023 00:19,  No significant change was found

## 2025-01-28 PROBLEM — R10.13 EPIGASTRIC PAIN: Status: ACTIVE | Noted: 2025-01-28

## 2025-01-28 LAB — SURGICAL PATHOLOGY REPORT: NORMAL

## 2025-02-18 ENCOUNTER — HOSPITAL ENCOUNTER (OUTPATIENT)
Dept: SLEEP CENTER | Age: 55
Discharge: HOME | End: 2025-02-20
Payer: COMMERCIAL

## 2025-02-18 DIAGNOSIS — K21.9 GASTROESOPHAGEAL REFLUX DISEASE WITHOUT ESOPHAGITIS: ICD-10-CM

## 2025-02-18 DIAGNOSIS — G47.33 OSA (OBSTRUCTIVE SLEEP APNEA): ICD-10-CM

## 2025-02-18 DIAGNOSIS — Z86.711 HISTORY OF PULMONARY EMBOLISM: ICD-10-CM

## 2025-02-18 DIAGNOSIS — E66.3 OVERWEIGHT (BMI 25.0-29.9): ICD-10-CM

## 2025-02-18 DIAGNOSIS — E78.5 HYPERLIPIDEMIA, UNSPECIFIED HYPERLIPIDEMIA TYPE: ICD-10-CM

## 2025-02-18 DIAGNOSIS — M85.88 OSTEOPENIA OF LUMBAR SPINE: ICD-10-CM

## 2025-02-18 DIAGNOSIS — Z98.84 HX OF BARIATRIC SURGERY: ICD-10-CM

## 2025-02-18 DIAGNOSIS — M19.90 ARTHRITIS: ICD-10-CM

## 2025-02-18 DIAGNOSIS — E55.9 VITAMIN D DEFICIENCY: ICD-10-CM

## 2025-02-18 DIAGNOSIS — R73.03 PREDIABETES: ICD-10-CM

## 2025-02-18 PROCEDURE — 95811 POLYSOM 6/>YRS CPAP 4/> PARM: CPT

## 2025-02-19 VITALS — HEIGHT: 66 IN | BODY MASS INDEX: 26.03 KG/M2 | WEIGHT: 162 LBS

## 2025-02-25 ENCOUNTER — PATIENT MESSAGE (OUTPATIENT)
Dept: FAMILY MEDICINE CLINIC | Age: 55
End: 2025-02-25

## 2025-02-25 DIAGNOSIS — Z98.84 HX OF BARIATRIC SURGERY: ICD-10-CM

## 2025-02-25 DIAGNOSIS — R51.9 CHRONIC INTRACTABLE HEADACHE, UNSPECIFIED HEADACHE TYPE: Primary | ICD-10-CM

## 2025-02-25 DIAGNOSIS — G89.29 CHRONIC INTRACTABLE HEADACHE, UNSPECIFIED HEADACHE TYPE: Primary | ICD-10-CM

## 2025-02-25 DIAGNOSIS — R11.0 NAUSEA: ICD-10-CM

## 2025-02-26 RX ORDER — BUTALBITAL, ACETAMINOPHEN AND CAFFEINE 50; 325; 40 MG/1; MG/1; MG/1
1 TABLET ORAL EVERY 4 HOURS PRN
Qty: 30 TABLET | Refills: 1 | Status: SHIPPED | OUTPATIENT
Start: 2025-02-26

## 2025-02-26 RX ORDER — ONDANSETRON 4 MG/1
4 TABLET, ORALLY DISINTEGRATING ORAL EVERY 8 HOURS PRN
Qty: 90 TABLET | Refills: 1 | Status: SHIPPED | OUTPATIENT
Start: 2025-02-26

## 2025-02-26 RX ORDER — SYRINGE W-NEEDLE,DISPOSAB,3 ML 25GX5/8"
1 SYRINGE, EMPTY DISPOSABLE MISCELLANEOUS WEEKLY
Qty: 10 EACH | Refills: 1 | Status: SHIPPED | OUTPATIENT
Start: 2025-02-26

## (undated) DEVICE — SUTURE VCRL + SZ 2-0 L27IN ABSRB CLR CT-1 1/2 CIR TAPERCUT VCP259H

## (undated) DEVICE — 2DSM24 2-0 UND MONODERM 30X30: Brand: 2DSM24 2-0 UND MONODERM 30X30

## (undated) DEVICE — Device

## (undated) DEVICE — SINGLE-USE BIOPSY FORCEPS: Brand: RADIAL JAW 4

## (undated) DEVICE — SUTURE PROL SZ 2-0 L18IN NONABSORBABLE BLU FS L26MM 3/8 CIR 8685H

## (undated) DEVICE — 3M™ WARMING BLANKET, UPPER BODY, 10 PER CASE, 42268: Brand: BAIR HUGGER™

## (undated) DEVICE — PENCIL ES L3M BTTN SWCH HOLSTER W/ BLDE ELECTRD EDGE

## (undated) DEVICE — INTENDED FOR TISSUE SEPARATION, AND OTHER PROCEDURES THAT REQUIRE A SHARP SURGICAL BLADE TO PUNCTURE OR CUT.: Brand: BARD-PARKER ® STAINLESS STEEL BLADES

## (undated) DEVICE — DRESSING GERM 6-12FR DIA1IN CNTR H 4MM ANTIMIC PROTCT DISK

## (undated) DEVICE — DRESSING,GAUZE,XEROFORM,CURAD,5"X9",ST: Brand: CURAD

## (undated) DEVICE — ENDO KIT W/SYRINGE: Brand: MEDLINE INDUSTRIES, INC.

## (undated) DEVICE — ELECTRODE PT RET AD L9FT HI MOIST COND ADH HYDRGEL CORDED

## (undated) DEVICE — COUNTER NDL 40 COUNT HLD 70 FOAM BLK ADH W/ MAG

## (undated) DEVICE — DECANTER BAG 9": Brand: MEDLINE INDUSTRIES, INC.

## (undated) DEVICE — MHPB GEN MINOR PACK: Brand: MEDLINE INDUSTRIES, INC.

## (undated) DEVICE — INTENDED FOR TISSUE SEPARATION, AND OTHER PROCEDURES THAT REQUIRE A SHARP SURGICAL BLADE TO PUNCTURE OR CUT.: Brand: BARD-PARKER ® CARBON RIB-BACK BLADES

## (undated) DEVICE — 3M™ PRECISE™ VISTA DISPOSABLE SKIN STAPLER 3995: Brand: 3M™ PRECISE™

## (undated) DEVICE — APPLICATOR MEDICATED 26 CC SOLUTION HI LT ORNG CHLORAPREP

## (undated) DEVICE — DRAIN SURG 19FR L025IN DIA63MM SIL CHN RND FULL FLUT CLS

## (undated) DEVICE — SUTURE PDS II SZ 0 L60IN ABSRB VLT L65MM TP-1 1/2 CIR Z991G

## (undated) DEVICE — TOTAL TRAY, 16FR 10ML SIL FOLEY, URN: Brand: MEDLINE

## (undated) DEVICE — GAUZE,SPONGE,4"X4",16PLY,XRAY,STRL,LF: Brand: MEDLINE

## (undated) DEVICE — GLOVE SURG SZ 6 THK91MIL LTX FREE SYN POLYISOPRENE ANTI

## (undated) DEVICE — 1010 S-DRAPE TOWEL DRAPE 10/BX: Brand: STERI-DRAPE™

## (undated) DEVICE — SUTURE VCRL SZ 3-0 L27IN ABSRB UD L26MM SH 1/2 CIR J416H

## (undated) DEVICE — STRAP,POSITIONING,KNEE/BODY,FOAM,4X60": Brand: MEDLINE

## (undated) DEVICE — DEFENDO AIR WATER SUCTION AND BIOPSY VALVE KIT FOR  OLYMPUS: Brand: DEFENDO AIR/WATER/SUCTION AND BIOPSY VALVE

## (undated) DEVICE — TOWEL,OR,DSP,ST,BLUE,STD,6/PK,12PK/CS: Brand: MEDLINE

## (undated) DEVICE — AGENT HEMSTAT 3GM OXIDIZED REGENERATED CELOS ABSRB FOR CONT (ORDER MULTIPLES OF 5EA)

## (undated) DEVICE — KIT NEG PRSS FOR NONLIN OR UPTO 90CM LIN INCIS PREVENA +

## (undated) DEVICE — BINDER ABD M/L H12IN FOR 46-62IN WHT 4 SLD PNL DSGN HOOP

## (undated) DEVICE — 3M™ STERI-DRAPE™  POUCH WITH IOBAN™ 2 INCISE FILM 6657: Brand: STERI-DRAPE™ IOBAN™ 2

## (undated) DEVICE — SPONGE LAP W18XL18IN WHT COT 4 PLY FLD STRUNG RADPQ DISP ST

## (undated) DEVICE — 3M™ TEGADERM™ I.V. ADVANCED SECUREMENT DRESSING, 1683, 2-1/2 IN X 2-3/4 IN, 6.5 CM X 7 CM, 100/CT 4CT/CASE: Brand: 3M™ TEGADERM™

## (undated) DEVICE — 3M™ STERI-DRAPE™ U-DRAPE 1015: Brand: STERI-DRAPE™